# Patient Record
Sex: FEMALE | Race: WHITE | Employment: FULL TIME | URBAN - METROPOLITAN AREA
[De-identification: names, ages, dates, MRNs, and addresses within clinical notes are randomized per-mention and may not be internally consistent; named-entity substitution may affect disease eponyms.]

---

## 2017-02-13 PROBLEM — N92.1 MENOMETRORRHAGIA: Status: ACTIVE | Noted: 2017-02-13

## 2017-02-13 PROBLEM — R31.9 HEMATURIA: Status: ACTIVE | Noted: 2017-02-13

## 2017-02-13 PROBLEM — N80.9 ENDOMETRIOSIS: Status: ACTIVE | Noted: 2017-02-13

## 2017-03-29 ENCOUNTER — HOSPITAL ENCOUNTER (OUTPATIENT)
Dept: MAMMOGRAPHY | Age: 47
Discharge: HOME | End: 2017-03-29
Attending: OBSTETRICS & GYNECOLOGY
Payer: SELF-PAY

## 2017-03-29 DIAGNOSIS — Z12.39 SCREENING FOR BREAST CANCER: ICD-10-CM

## 2017-03-29 PROCEDURE — 77067 SCR MAMMO BI INCL CAD: CPT

## 2018-03-30 ENCOUNTER — HOSPITAL ENCOUNTER (OUTPATIENT)
Dept: MAMMOGRAPHY | Age: 48
Discharge: HOME | End: 2018-03-30
Attending: OBSTETRICS & GYNECOLOGY
Payer: SELF-PAY

## 2018-03-30 DIAGNOSIS — Z12.31 VISIT FOR SCREENING MAMMOGRAM: ICD-10-CM

## 2018-03-30 PROCEDURE — 77063 BREAST TOMOSYNTHESIS BI: CPT

## 2018-04-12 ENCOUNTER — HOSPITAL ENCOUNTER (OUTPATIENT)
Dept: MAMMOGRAPHY | Age: 48
Discharge: HOME | End: 2018-04-12
Attending: OBSTETRICS & GYNECOLOGY
Payer: SELF-PAY

## 2018-04-12 DIAGNOSIS — R92.8 ABNORMAL SCREENING MAMMOGRAM: ICD-10-CM

## 2018-04-12 PROCEDURE — 76642 ULTRASOUND BREAST LIMITED: CPT

## 2018-04-13 PROBLEM — N60.09 BREAST CYST: Status: ACTIVE | Noted: 2018-04-13

## 2019-07-19 ENCOUNTER — HOSPITAL ENCOUNTER (OUTPATIENT)
Dept: MAMMOGRAPHY | Age: 49
Discharge: HOME | End: 2019-07-19
Attending: OBSTETRICS & GYNECOLOGY
Payer: SELF-PAY

## 2019-07-19 DIAGNOSIS — Z12.31 ENCOUNTER FOR SCREENING MAMMOGRAM FOR MALIGNANT NEOPLASM OF BREAST: ICD-10-CM

## 2019-07-19 PROCEDURE — 77063 BREAST TOMOSYNTHESIS BI: CPT

## 2019-07-22 NOTE — PROGRESS NOTES
3D Mammo (7/22/19): BiRads 2, BD3    BD3 indicates more dense breast tissue which can affect detection of early breast cancers. If pt desired FU breast MRI she can request this but would recommend checking with her insurance company first for coverage. Would also recommend getting 3D mammo next year. Breast density information should have been mailed to the pt already. Call with any questions.

## 2019-08-01 ENCOUNTER — HOSPITAL ENCOUNTER (OUTPATIENT)
Dept: LAB | Age: 49
Discharge: HOME | End: 2019-08-01

## 2019-08-01 PROCEDURE — 88305 TISSUE EXAM BY PATHOLOGIST: CPT

## 2020-06-17 ENCOUNTER — HOSPITAL ENCOUNTER (OUTPATIENT)
Dept: MAMMOGRAPHY | Age: 50
Discharge: HOME | End: 2020-06-17
Attending: OBSTETRICS & GYNECOLOGY
Payer: SELF-PAY

## 2020-06-17 DIAGNOSIS — Z12.39 SCREENING FOR BREAST CANCER: ICD-10-CM

## 2020-06-17 PROCEDURE — 77063 BREAST TOMOSYNTHESIS BI: CPT

## 2020-06-19 PROBLEM — R92.2 DENSE BREAST TISSUE: Status: ACTIVE | Noted: 2020-06-19

## 2020-06-19 NOTE — PROGRESS NOTES
BiRads 2, BD4    BD4 indicates very dense breast tissue which can affect detection of early breast cancers. Would consider FU breast MRI ---would recommend checking with her insurance company first for coverage. Would also recommend getting 3D mammo next year. Breast density information should have been mailed to the pt already. Call with any questions.

## 2021-05-24 ENCOUNTER — TRANSCRIBE ORDER (OUTPATIENT)
Dept: SCHEDULING | Age: 51
End: 2021-05-24

## 2021-05-24 DIAGNOSIS — Z12.31 SCREENING MAMMOGRAM FOR HIGH-RISK PATIENT: Primary | ICD-10-CM

## 2021-07-13 PROBLEM — M47.14 THORACIC MYELOPATHY: Status: ACTIVE | Noted: 2021-07-13

## 2021-07-13 PROBLEM — G95.81 CONUS MEDULLARIS SYNDROME (HCC): Status: ACTIVE | Noted: 2021-07-13

## 2021-08-16 ENCOUNTER — TRANSCRIBE ORDER (OUTPATIENT)
Dept: SCHEDULING | Age: 51
End: 2021-08-16

## 2021-08-16 DIAGNOSIS — Z12.31 SCREENING MAMMOGRAM FOR HIGH-RISK PATIENT: Primary | ICD-10-CM

## 2021-08-16 PROBLEM — S24.143A: Status: ACTIVE | Noted: 2021-08-16

## 2021-08-16 PROBLEM — R25.2 SPASTICITY: Status: ACTIVE | Noted: 2021-08-16

## 2021-08-17 ENCOUNTER — HOSPITAL ENCOUNTER (OUTPATIENT)
Dept: MAMMOGRAPHY | Age: 51
Discharge: HOME | End: 2021-08-17
Attending: OBSTETRICS & GYNECOLOGY

## 2021-08-17 DIAGNOSIS — Z12.31 SCREENING MAMMOGRAM FOR HIGH-RISK PATIENT: ICD-10-CM

## 2021-08-17 PROCEDURE — 77063 BREAST TOMOSYNTHESIS BI: CPT

## 2021-08-18 NOTE — PROGRESS NOTES
BI-RADS 2, BD 3    BD3 indicates more dense breast tissue which can affect detection of early breast cancers. *  If pt desired FU breast MRI she can request this but would recommend checking with her insurance company first for coverage. Would also recommend getting 3D mammo next year. Breast density information should have been mailed to the pt already. Call with any questions.

## 2021-10-05 ENCOUNTER — HOSPITAL ENCOUNTER (OUTPATIENT)
Dept: MRI IMAGING | Age: 51
Discharge: HOME | End: 2021-10-05
Attending: PSYCHIATRY & NEUROLOGY

## 2021-10-05 DIAGNOSIS — G95.81 CONUS MEDULLARIS SYNDROME (HCC): ICD-10-CM

## 2021-10-05 DIAGNOSIS — R25.2 SPASTICITY: ICD-10-CM

## 2021-10-05 DIAGNOSIS — S24.143A: ICD-10-CM

## 2021-10-05 DIAGNOSIS — M47.14 THORACIC MYELOPATHY: ICD-10-CM

## 2021-11-02 ENCOUNTER — HOSPITAL ENCOUNTER (OUTPATIENT)
Dept: MRI IMAGING | Age: 51
Discharge: HOME | End: 2021-11-02
Attending: PSYCHIATRY & NEUROLOGY

## 2021-11-02 DIAGNOSIS — R25.2 SPASTICITY: ICD-10-CM

## 2021-11-02 DIAGNOSIS — G95.81 CONUS MEDULLARIS SYNDROME (HCC): ICD-10-CM

## 2021-11-02 DIAGNOSIS — M47.14 THORACIC MYELOPATHY: ICD-10-CM

## 2021-11-02 DIAGNOSIS — S24.143A: ICD-10-CM

## 2021-11-02 PROCEDURE — 72157 MRI CHEST SPINE W/O & W/DYE: CPT

## 2021-11-02 PROCEDURE — 74011250636 HC RX REV CODE- 250/636: Performed by: PSYCHIATRY & NEUROLOGY

## 2021-11-02 PROCEDURE — A9576 INJ PROHANCE MULTIPACK: HCPCS | Performed by: PSYCHIATRY & NEUROLOGY

## 2021-11-02 RX ORDER — SODIUM CHLORIDE 0.9 % (FLUSH) 0.9 %
10 SYRINGE (ML) INJECTION
Status: COMPLETED | OUTPATIENT
Start: 2021-11-02 | End: 2021-11-02

## 2021-11-02 RX ADMIN — Medication 10 ML: at 21:33

## 2021-11-02 RX ADMIN — GADOTERIDOL 13 ML: 279.3 INJECTION, SOLUTION INTRAVENOUS at 21:33

## 2021-11-04 ENCOUNTER — HOSPITAL ENCOUNTER (EMERGENCY)
Age: 51
Discharge: HOME | End: 2021-11-04
Attending: EMERGENCY MEDICINE

## 2021-11-04 VITALS
RESPIRATION RATE: 20 BRPM | OXYGEN SATURATION: 100 % | HEART RATE: 85 BPM | DIASTOLIC BLOOD PRESSURE: 96 MMHG | BODY MASS INDEX: 23.3 KG/M2 | WEIGHT: 145 LBS | SYSTOLIC BLOOD PRESSURE: 173 MMHG | TEMPERATURE: 98 F | HEIGHT: 66 IN

## 2021-11-04 DIAGNOSIS — R21 RASH: Primary | ICD-10-CM

## 2021-11-04 LAB
GLUCOSE BLD STRIP.AUTO-MCNC: 95 MG/DL (ref 65–100)
SERVICE CMNT-IMP: NORMAL
VIT B12 SERPL-MCNC: 192 PG/ML (ref 193–986)

## 2021-11-04 PROCEDURE — 99283 EMERGENCY DEPT VISIT LOW MDM: CPT

## 2021-11-04 PROCEDURE — 82962 GLUCOSE BLOOD TEST: CPT

## 2021-11-04 PROCEDURE — 82607 VITAMIN B-12: CPT

## 2021-11-04 RX ORDER — PREDNISONE 5 MG/1
TABLET ORAL
Qty: 48 TABLET | Refills: 0 | Status: SHIPPED | OUTPATIENT
Start: 2021-11-04 | End: 2022-01-13

## 2021-11-04 NOTE — ED TRIAGE NOTES
Pt states knot on left side of neck for about one month. States she has a rash on neck also. Pt has been evaluated by neurologist due to back and neck pain but needs some blood drawn today. Masked for triage.

## 2021-11-04 NOTE — ED NOTES
Pt with several months h/o worsening weakness to lower ext, has a neurologist has been following her. She has had lumbar punctures and had a thoracic MRI done on Tuesday that was negative, neurologist wished to get a B12 level and then schedule perhaps MRI of the brain and cervical spine patient to ER complaint of continued not to the left side of her neck that is been there for about a month failed steroid treatment.   She has no primary care physician

## 2021-11-04 NOTE — ED NOTES
I have reviewed discharge instructions with the patient. The patient verbalized understanding. Patient left ED via Discharge Method: ambulatory to Home with self. Opportunity for questions and clarification provided. Patient given 1 scripts. To continue your aftercare when you leave the hospital, you may receive an automated call from our care team to check in on how you are doing. This is a free service and part of our promise to provide the best care and service to meet your aftercare needs.  If you have questions, or wish to unsubscribe from this service please call [phone]. Thank you for Choosing our [facility] Emergency Department.

## 2021-11-04 NOTE — ED PROVIDER NOTES
For about 1 month she has had some rash to the back of her neck. It is somewhat thickened and remains itching. She is changed to a T-Gel shampoo and it seems like that and some topical steroids have helped some but incompletely so. Now has some areas that are behind the left ear as well it is more on the left side of the neck than the right. She was worried that it might be from sleeping too hot and got a cooling pillow and is not changed with this. She is not diabetic. 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 No direct contact to this area of any chemicals or irritants that she can recall. She has never had anything similar. She has a knot to the left side of her neck she became worried about comes to our department partially related to this. The history is provided by the patient. Neck Pain   This is a chronic (More than 1 month) problem. The problem occurs constantly. The problem has not changed since onset. There has been no fever. The pain is the same all the time. She has tried nothing for the symptoms.         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 ([address] 8/16/2021    Conus medullaris syndrome ([address] 7/13/2021    Endometriosis determined by laparoscopy     Fibroids     Fibromyalgia     High cholesterol     Thoracic myelopathy 7/13/2021       Past Surgical History:   Procedure Laterality Date    HX GYN  1990s    laparoscopy    HX HEENT      right ear, stapes replaced    HX ORTHOPAEDIC      carpal tunnel release, right         Family History:   Problem Relation Age of Onset    Hypertension Mother     Cancer Mother     Heart Disease Maternal Grandmother     Hypertension Maternal Grandmother     Diabetes Maternal Grandmother     Malignant Hyperthermia Neg Hx     Pseudocholinesterase Deficiency Neg Hx     Delayed Awakening Neg Hx     Post-op Nausea/Vomiting Neg Hx     Emergence Delirium Neg Hx     Post-op Cognitive Dysfunction Neg Hx     Other Neg Hx     Breast Cancer Neg Hx        Social History     Socioeconomic History    Marital status: SINGLE     Spouse name: Not on file    Number of children: Not on file    Years of education: Not on file    Highest education level: Not on file   Occupational History    Not on file   Tobacco Use    Smoking status: Never Smoker    Smokeless tobacco: Never Used   Vaping Use    Vaping Use: Never used   Substance and Sexual Activity    Alcohol use: No    Drug use: No    Sexual activity: Yes     Partners: Male     Birth control/protection: Pill   Other Topics Concern    Not on file   Social History Narrative    Not on file     Social Determinants of Health     Financial Resource Strain:     Difficulty of Paying Living Expenses: Not on file   Food Insecurity:     Worried About Running Out of Food in the Last Year: Not on file    Rabia of Food in the Last Year: Not on file   Transportation Needs:     Lack of Transportation (Medical): Not on file    Lack of Transportation (Non-Medical):  Not on file   Physical Activity:     Days of Exercise per Week: Not on file    Minutes of Exercise per Session: Not on file   Stress:     Feeling of Stress : Not on file   Social Connections:     Frequency of Communication with Friends and Family: Not on file    Frequency of Social Gatherings with Friends and Family: Not on file    Attends Evangelical Services: Not on file    Active Member of [address] [place] [address] [employer] or Organizations: Not on file    Attends Club or Organization Meetings: Not on file    Marital Status: Not on file   Intimate Partner Violence:     Fear of Current or Ex-Partner: Not on file    Emotionally Abused: Not on file    Physically Abused: Not on file    Sexually Abused: Not on file   Housing Stability:     Unable to Pay for Housing in the Last Year: Not on file    Number of [place] in the Last Year: Not on file    Unstable Housing in the Last Year: Not on file ALLERGIES: Patient has no known allergies. Review of Systems   Musculoskeletal: Positive for neck pain. Skin: Positive for rash. Neurological: Negative. Psychiatric/Behavioral: Negative. All other systems reviewed and are negative. Vitals:    11/04/21 1616   BP: (!) 173/96   Pulse: 85   Resp: 20   Temp: 98 °F (36.7 °C)   SpO2: 100%   Weight: 65.8 kg (145 lb)   Height: 5' 6\" (1.676 m)            Physical Exam  Vitals and nursing note reviewed. Constitutional:       General: She is not in acute distress. Appearance: Normal appearance. She is well-developed. HENT:      Head: Atraumatic. Right Ear: External ear normal.      Left Ear: External ear normal.      Nose: Nose normal.   Eyes:      General: No scleral icterus. Cardiovascular:      Rate and Rhythm: Normal rate. Pulmonary:      Effort: Pulmonary effort is normal. No respiratory distress. Abdominal:      Palpations: Abdomen is soft. Musculoskeletal:         General: Normal range of motion. Cervical back: Neck supple. Skin:     General: Skin is warm and dry. Findings: Rash present. Comments: Dry slightly raised and thickened rash to the nape of her neck little bit more to the left of midline than central with some extension of similar though less involved to the area behind the left ear   Neurological:      Mental Status: She is alert. Mental status is at baseline. Psychiatric:         Thought Content: Thought content normal.          MDM  Number of Diagnoses or Management Options  Diagnosis management comments: Rashes been present for well over a month. Most likely is related to some sort of contact irritant though unable to identify in discussion with patient. Place her on a short course of oral steroids. She is not diabetic. If persists may need dermatology or other work-up.   She is to change to mild soaps and shampoos and to shampoo away from area       Amount and/or Complexity of Data Reviewed  Clinical lab tests: reviewed    Risk of Complications, Morbidity, and/or Mortality  Presenting problems: moderate  Management options: moderate    Patient Progress  Patient progress: stable         Procedures

## 2021-11-04 NOTE — DISCHARGE INSTRUCTIONS
Suspect the rash is due to some contact irritant a certain you are not wearing necklaces and use mild shampoo you may continue to use present shampoo(T gell)  Benadryl for itching (by mouth)  Not on the left side your neck does not appear to be related to acute abscess but most likely inflammatory reaction to adjacent lymph node

## 2022-01-05 ENCOUNTER — HOSPITAL ENCOUNTER (OUTPATIENT)
Dept: MRI IMAGING | Age: 52
Discharge: HOME | End: 2022-01-05
Attending: PSYCHIATRY & NEUROLOGY

## 2022-01-05 DIAGNOSIS — R25.2 SPASTICITY: ICD-10-CM

## 2022-01-05 DIAGNOSIS — S24.143A: ICD-10-CM

## 2022-01-05 DIAGNOSIS — G37.9 CNS DEMYELINATING DISEASE (HCC): ICD-10-CM

## 2022-01-05 PROCEDURE — 74011250636 HC RX REV CODE- 250/636: Performed by: PSYCHIATRY & NEUROLOGY

## 2022-01-05 PROCEDURE — 72156 MRI NECK SPINE W/O & W/DYE: CPT

## 2022-01-05 PROCEDURE — 70553 MRI BRAIN STEM W/O & W/DYE: CPT

## 2022-01-05 PROCEDURE — A9576 INJ PROHANCE MULTIPACK: HCPCS | Performed by: PSYCHIATRY & NEUROLOGY

## 2022-01-05 RX ORDER — SODIUM CHLORIDE 0.9 % (FLUSH) 0.9 %
10 SYRINGE (ML) INJECTION
Status: COMPLETED | OUTPATIENT
Start: 2022-01-05 | End: 2022-01-05

## 2022-01-05 RX ADMIN — GADOTERIDOL 13 ML: 279.3 INJECTION, SOLUTION INTRAVENOUS at 19:55

## 2022-01-05 RX ADMIN — Medication 10 ML: at 19:55

## 2022-01-14 RX ORDER — CEFAZOLIN SODIUM/WATER 2 G/20 ML
2 SYRINGE (ML) INTRAVENOUS ONCE
Status: CANCELLED | OUTPATIENT
Start: 2022-01-14 | End: 2022-01-14

## 2022-01-19 ENCOUNTER — HOSPITAL ENCOUNTER (OUTPATIENT)
Dept: SURGERY | Age: 52
Discharge: HOME | End: 2022-01-19

## 2022-01-19 VITALS
TEMPERATURE: 97.3 F | HEART RATE: 74 BPM | WEIGHT: 148.4 LBS | RESPIRATION RATE: 18 BRPM | OXYGEN SATURATION: 100 % | SYSTOLIC BLOOD PRESSURE: 166 MMHG | DIASTOLIC BLOOD PRESSURE: 73 MMHG | BODY MASS INDEX: 23.85 KG/M2 | HEIGHT: 66 IN

## 2022-01-19 LAB
ANION GAP SERPL CALC-SCNC: 6 MMOL/L (ref 7–16)
APPEARANCE UR: ABNORMAL
BACTERIA SPEC CULT: NORMAL
BACTERIA URNS QL MICRO: ABNORMAL /HPF
BASOPHILS # BLD: 0 K/UL (ref 0–0.2)
BASOPHILS NFR BLD: 1 % (ref 0–2)
BILIRUB UR QL: NEGATIVE
BUN SERPL-MCNC: 8 MG/DL (ref 6–23)
CALCIUM SERPL-MCNC: 9.3 MG/DL (ref 8.3–10.4)
CASTS URNS QL MICRO: ABNORMAL /LPF
CHLORIDE SERPL-SCNC: 109 MMOL/L (ref 98–107)
CO2 SERPL-SCNC: 26 MMOL/L (ref 21–32)
COLOR UR: YELLOW
CREAT SERPL-MCNC: 0.74 MG/DL (ref 0.6–1)
DIFFERENTIAL METHOD BLD: NORMAL
EOSINOPHIL # BLD: 0.3 K/UL (ref 0–0.8)
EOSINOPHIL NFR BLD: 6 % (ref 0.5–7.8)
EPI CELLS #/AREA URNS HPF: ABNORMAL /HPF
ERYTHROCYTE [DISTWIDTH] IN BLOOD BY AUTOMATED COUNT: 12.5 % (ref 11.9–14.6)
GLUCOSE SERPL-MCNC: 94 MG/DL (ref 65–100)
GLUCOSE UR STRIP.AUTO-MCNC: NEGATIVE MG/DL
HCT VFR BLD AUTO: 39.3 % (ref 35.8–46.3)
HGB BLD-MCNC: 13.4 G/DL (ref 11.7–15.4)
HGB UR QL STRIP: ABNORMAL
IMM GRANULOCYTES # BLD AUTO: 0 K/UL (ref 0–0.5)
IMM GRANULOCYTES NFR BLD AUTO: 1 % (ref 0–5)
KETONES UR QL STRIP.AUTO: NEGATIVE MG/DL
LEUKOCYTE ESTERASE UR QL STRIP.AUTO: ABNORMAL
LYMPHOCYTES # BLD: 1 K/UL (ref 0.5–4.6)
LYMPHOCYTES NFR BLD: 20 % (ref 13–44)
MCH RBC QN AUTO: 30.7 PG (ref 26.1–32.9)
MCHC RBC AUTO-ENTMCNC: 34.1 G/DL (ref 31.4–35)
MCV RBC AUTO: 90.1 FL (ref 79.6–97.8)
MONOCYTES # BLD: 0.4 K/UL (ref 0.1–1.3)
MONOCYTES NFR BLD: 8 % (ref 4–12)
NEUTS SEG # BLD: 3.4 K/UL (ref 1.7–8.2)
NEUTS SEG NFR BLD: 65 % (ref 43–78)
NITRITE UR QL STRIP.AUTO: NEGATIVE
NRBC # BLD: 0 K/UL (ref 0–0.2)
PH UR STRIP: 5.5 [PH] (ref 5–9)
PLATELET # BLD AUTO: 270 K/UL (ref 150–450)
PMV BLD AUTO: 9.8 FL (ref 9.4–12.3)
POTASSIUM SERPL-SCNC: 4.1 MMOL/L (ref 3.5–5.1)
PROT UR STRIP-MCNC: NEGATIVE MG/DL
RBC # BLD AUTO: 4.36 M/UL (ref 4.05–5.2)
RBC #/AREA URNS HPF: ABNORMAL /HPF
SERVICE CMNT-IMP: NORMAL
SODIUM SERPL-SCNC: 141 MMOL/L (ref 136–145)
SP GR UR REFRACTOMETRY: 1.01 (ref 1–1.02)
UROBILINOGEN UR QL STRIP.AUTO: 0.2 EU/DL (ref 0.2–1)
WBC # BLD AUTO: 5.2 K/UL (ref 4.3–11.1)
WBC URNS QL MICRO: ABNORMAL /HPF

## 2022-01-19 PROCEDURE — 77030027138 HC INCENT SPIROMETER -A

## 2022-01-19 PROCEDURE — 87641 MR-STAPH DNA AMP PROBE: CPT

## 2022-01-19 PROCEDURE — 80048 BASIC METABOLIC PNL TOTAL CA: CPT

## 2022-01-19 PROCEDURE — 85025 COMPLETE CBC W/AUTO DIFF WBC: CPT

## 2022-01-19 PROCEDURE — 81001 URINALYSIS AUTO W/SCOPE: CPT

## 2022-01-19 RX ORDER — HYDROCORTISONE 10 MG/ML
LOTION TOPICAL
COMMUNITY

## 2022-01-19 RX ORDER — DIPHENHYDRAMINE HCL 25 MG
25 TABLET ORAL AS NEEDED
COMMUNITY

## 2022-01-19 NOTE — PERIOP NOTES
Recent Results (from the past 12 hour(s))   URINALYSIS W/ RFLX MICROSCOPIC    Collection Time: 01/19/22  1:00 PM   Result Value Ref Range    Color YELLOW      Appearance CLOUDY      Specific gravity 1.015 1.001 - 1.023      pH (UA) 5.5 5.0 - 9.0      Protein Negative NEG mg/dL    Glucose Negative mg/dL    Ketone Negative NEG mg/dL    Bilirubin Negative NEG      Blood SMALL (A) NEG      Urobilinogen 0.2 0.2 - 1.0 EU/dL    Nitrites Negative NEG      Leukocyte Esterase TRACE (A) NEG      WBC 10-20 0 /hpf    RBC 5-10 0 /hpf    Epithelial cells 5-10 0 /hpf    Bacteria TRACE 0 /hpf    Casts 3-5 0 /lpf   CBC WITH AUTOMATED DIFF    Collection Time: 01/19/22  1:06 PM   Result Value Ref Range    WBC 5.2 4.3 - 11.1 K/uL    RBC 4.36 4.05 - 5.2 M/uL    HGB 13.4 11.7 - 15.4 g/dL    HCT 39.3 35.8 - 46.3 %    MCV 90.1 79.6 - 97.8 FL    MCH 30.7 26.1 - 32.9 PG    MCHC 34.1 31.4 - 35.0 g/dL    RDW 12.5 11.9 - 14.6 %    PLATELET 974 612 - 437 K/uL    MPV 9.8 9.4 - 12.3 FL    ABSOLUTE NRBC 0.00 0.0 - 0.2 K/uL    DF AUTOMATED      NEUTROPHILS 65 43 - 78 %    LYMPHOCYTES 20 13 - 44 %    MONOCYTES 8 4.0 - 12.0 %    EOSINOPHILS 6 0.5 - 7.8 %    BASOPHILS 1 0.0 - 2.0 %    IMMATURE GRANULOCYTES 1 0.0 - 5.0 %    ABS. NEUTROPHILS 3.4 1.7 - 8.2 K/UL    ABS. LYMPHOCYTES 1.0 0.5 - 4.6 K/UL    ABS. MONOCYTES 0.4 0.1 - 1.3 K/UL    ABS. EOSINOPHILS 0.3 0.0 - 0.8 K/UL    ABS. BASOPHILS 0.0 0.0 - 0.2 K/UL    ABS. IMM. GRANS. 0.0 0.0 - 0.5 K/UL   METABOLIC PANEL, BASIC    Collection Time: 01/19/22  1:06 PM   Result Value Ref Range    Sodium 141 136 - 145 mmol/L    Potassium 4.1 3.5 - 5.1 mmol/L    Chloride 109 (H) 98 - 107 mmol/L    CO2 26 21 - 32 mmol/L    Anion gap 6 (L) 7 - 16 mmol/L    Glucose 94 65 - 100 mg/dL    BUN 8 6 - 23 MG/DL    Creatinine 0.74 0.6 - 1.0 MG/DL    GFR est AA >60 >60 ml/min/1.73m2    GFR est non-AA >60 >60 ml/min/1.73m2    Calcium 9.3 8.3 - 10.4 MG/DL     Reviewed.   Pramod Aldrich at Dr. Mary Cintron office notified of UA results: blood small, bacteria trace, leukocyte esterase trace. Shira No advised will forward to [employer].     MRSA/MSSA in process, chart flagged for charge RN  Routed to surgeon

## 2022-01-19 NOTE — PERIOP NOTES
PLEASE CONTINUE TAKING ALL PRESCRIPTION MEDICATIONS UP TO THE DAY OF SURGERY UNLESS OTHERWISE DIRECTED BELOW. DISCONTINUE all vitamins and supplements now for surgery. DISCONTINUE Non-Steriodal Anti-Inflammatory (NSAIDS) such as Advil and Aleve 5 days prior to surgery. Home Medications to take  the day of surgery    sprintec           Home Medications   to Hold   Vitamins, Supplements, and Herbals. Non-Steriodal Anti-Inflammatory (NSAIDS) such as Advil and Aleve. Comments    Covid test 1/21/22 at 9:20 am @ [facility]    On the day before surgery please take Acetaminophen 1000mg in the morning and then again before bed. You may substitute for Tylenol 650 mg.      Bring the incentive spirometer to the hospital       Please do not bring home medications with you on the day of surgery unless otherwise directed by your nurse. If you are instructed to bring home medications, please give them to your nurse as they will be administered by the nursing staff. If you have any questions, please call [address] [place] [address] [phone] or [facility] [phone]. A copy of this note was provided to the patient for reference. How to Use Your Incentive Spirometer       About Your Incentive Spirometer  An incentive spirometer is a device that will expand your lungs by helping you to breathe more deeply and fully. The parts of your incentive spirometer are labeled in Figure 1. Using your incentive spirometer  When youre using your incentive spirometer, make sure to breathe through your mouth. If you breathe through your nose, the incentive spirometer wont work properly. You can hold your nose if you have trouble. DO NOT BLOW INTO THE DEVICE. If you feel dizzy at any time, stop and rest. Try again at a later time. 1. Sit upright in a chair or in bed. Hold the incentive spirometer at eye level.    2. Put the mouthpiece in your mouth and close your lips tightly around it. Slowly breathe out (exhale) completely. 3. Breathe in (inhale) slowly through your mouth as deeply as you can. As you take the breath, you will see the piston rise inside the large column. While the piston rises, the indicator on the right should move upwards. It should stay in between the 2 arrows (see Figure 1). 4. Try to get the piston as high as you can, while keeping the indicator between the arrows. If the indicator doesnt stay between the arrows, youre breathing either too fast or too slow. 5. When you get it as high as you can, hold your breath for 10 seconds, or as long as possible. While youre holding your breath, the piston will slowly fall to the base of the spirometer. 6. Once the piston reaches the bottom of the spirometer, breathe out slowly through your mouth. Rest for a few seconds. 7. Repeat 10 times. Try to get the piston to the same level with each breath. 8. After each set of 10 breaths, try to cough as coughing will help loosen or clear any mucus in your lungs. 9. Put the marker at the level the piston reached on your incentive spirometer. This will be your goal next time. Repeat these steps every hour that youre awake.   Cover the mouthpiece of the incentive spirometer when you arent using it

## 2022-01-19 NOTE — PERIOP NOTES
Patient verified name, , and surgery as listed in [facility]. Patient provided medical/health information and PTA medications to the best of their ability. TYPE  CASE: 3  Orders per surgeon:  received  Labs per surgeon/spine protocol: CBC with diff, BMP, UA, MRSA/MSSA Results: pending  Labs per anesthesia protocol: CBC, BMP included in above labs. T&s s/h for DOS  EKG:  Not indicated    Patient COVID test date 22; Patient confirmed the appointment. The testing center is located at the Health system. If appointment is needed patient provided telephone number of [phone]. Nasal Swab collected per MD order. Patient provided with and instructed on education handouts including Guide to Surgery, blood transfusions, pain management, and hand hygiene for the family and community, and [facility] brochure. Road to Recovery Spine surgery patient guide given. Instructed on incentive spirometer, and patient given an incentive spirometer demonstration. Patient viewed spine prehab video. Hibiclens and instructions given per hospital policy. Medications discussed during patient appointment. Patient teach back successful and patient demonstrates knowledge of instruction.

## 2022-01-24 ENCOUNTER — ANESTHESIA EVENT (OUTPATIENT)
Dept: SURGERY | Age: 52
End: 2022-01-24

## 2022-01-25 ENCOUNTER — ANESTHESIA (OUTPATIENT)
Dept: SURGERY | Age: 52
End: 2022-01-25

## 2022-01-25 ENCOUNTER — HOSPITAL ENCOUNTER (OUTPATIENT)
Age: 52
Setting detail: OUTPATIENT SURGERY
Discharge: HOME | End: 2022-01-25
Attending: ORTHOPAEDIC SURGERY | Admitting: ORTHOPAEDIC SURGERY

## 2022-01-25 ENCOUNTER — APPOINTMENT (OUTPATIENT)
Dept: GENERAL RADIOLOGY | Age: 52
End: 2022-01-25
Attending: ORTHOPAEDIC SURGERY

## 2022-01-25 VITALS
WEIGHT: 145 LBS | BODY MASS INDEX: 23.3 KG/M2 | OXYGEN SATURATION: 98 % | DIASTOLIC BLOOD PRESSURE: 69 MMHG | HEART RATE: 75 BPM | RESPIRATION RATE: 18 BRPM | HEIGHT: 66 IN | SYSTOLIC BLOOD PRESSURE: 140 MMHG | TEMPERATURE: 98.1 F

## 2022-01-25 DIAGNOSIS — M48.02 CERVICAL SPINAL STENOSIS: Primary | ICD-10-CM

## 2022-01-25 PROBLEM — G95.9 MYELOPATHY (HCC): Status: ACTIVE | Noted: 2022-01-25

## 2022-01-25 LAB
ABO + RH BLD: NORMAL
BLOOD GROUP ANTIBODIES SERPL: NORMAL
HCG UR QL: NEGATIVE
SPECIMEN EXP DATE BLD: NORMAL

## 2022-01-25 PROCEDURE — 77030003666 HC NDL SPINAL BD -A: Performed by: ORTHOPAEDIC SURGERY

## 2022-01-25 PROCEDURE — 74011250636 HC RX REV CODE- 250/636

## 2022-01-25 PROCEDURE — 74011000250 HC RX REV CODE- 250: Performed by: ANESTHESIOLOGY

## 2022-01-25 PROCEDURE — 77030012894: Performed by: ORTHOPAEDIC SURGERY

## 2022-01-25 PROCEDURE — 76010000161 HC OR TIME 1 TO 1.5 HR INTENSV-TIER 1: Performed by: ORTHOPAEDIC SURGERY

## 2022-01-25 PROCEDURE — C1713 ANCHOR/SCREW BN/BN,TIS/BN: HCPCS | Performed by: ORTHOPAEDIC SURGERY

## 2022-01-25 PROCEDURE — 77030028270 HC SRGFL HEMSTAT MTRX J&J -C: Performed by: ORTHOPAEDIC SURGERY

## 2022-01-25 PROCEDURE — 77030018673: Performed by: ORTHOPAEDIC SURGERY

## 2022-01-25 PROCEDURE — 77030019908 HC STETH ESOPH SIMS -A: Performed by: ANESTHESIOLOGY

## 2022-01-25 PROCEDURE — 77030010507 HC ADH SKN DERMBND J&J -B: Performed by: ORTHOPAEDIC SURGERY

## 2022-01-25 PROCEDURE — 77030041390 HC GRFT BN PROT GRWTH FCTR BSYC -E: Performed by: ORTHOPAEDIC SURGERY

## 2022-01-25 PROCEDURE — 77030011265 HC ELECTRD BLD HEX COVD -A: Performed by: ORTHOPAEDIC SURGERY

## 2022-01-25 PROCEDURE — 77030037088 HC TUBE ENDOTRACH ORAL NSL COVD-A: Performed by: ANESTHESIOLOGY

## 2022-01-25 PROCEDURE — 72020 X-RAY EXAM OF SPINE 1 VIEW: CPT

## 2022-01-25 PROCEDURE — 74011000250 HC RX REV CODE- 250

## 2022-01-25 PROCEDURE — 74011250636 HC RX REV CODE- 250/636: Performed by: ORTHOPAEDIC SURGERY

## 2022-01-25 PROCEDURE — 76210000021 HC REC RM PH II 0.5 TO 1 HR: Performed by: ORTHOPAEDIC SURGERY

## 2022-01-25 PROCEDURE — 74011250636 HC RX REV CODE- 250/636: Performed by: ANESTHESIOLOGY

## 2022-01-25 PROCEDURE — 81025 URINE PREGNANCY TEST: CPT

## 2022-01-25 PROCEDURE — 76060000033 HC ANESTHESIA 1 TO 1.5 HR: Performed by: ORTHOPAEDIC SURGERY

## 2022-01-25 PROCEDURE — 77030040922 HC BLNKT HYPOTHRM STRY -A: Performed by: ANESTHESIOLOGY

## 2022-01-25 PROCEDURE — 74011000272 HC RX REV CODE- 272: Performed by: ORTHOPAEDIC SURGERY

## 2022-01-25 PROCEDURE — 77030020268 HC MISC GENERAL SUPPLY: Performed by: ORTHOPAEDIC SURGERY

## 2022-01-25 PROCEDURE — 76210000006 HC OR PH I REC 0.5 TO 1 HR: Performed by: ORTHOPAEDIC SURGERY

## 2022-01-25 PROCEDURE — 86900 BLOOD TYPING SEROLOGIC ABO: CPT

## 2022-01-25 PROCEDURE — 74011000250 HC RX REV CODE- 250: Performed by: ORTHOPAEDIC SURGERY

## 2022-01-25 PROCEDURE — C1889 IMPLANT/INSERT DEVICE, NOC: HCPCS | Performed by: ORTHOPAEDIC SURGERY

## 2022-01-25 PROCEDURE — 77030031139 HC SUT VCRL2 J&J -A: Performed by: ORTHOPAEDIC SURGERY

## 2022-01-25 PROCEDURE — 77030040361 HC SLV COMPR DVT MDII -B: Performed by: ORTHOPAEDIC SURGERY

## 2022-01-25 PROCEDURE — 22853 INSJ BIOMECHANICAL DEVICE: CPT | Performed by: ORTHOPAEDIC SURGERY

## 2022-01-25 PROCEDURE — 77030029099 HC BN WAX SSPC -A: Performed by: ORTHOPAEDIC SURGERY

## 2022-01-25 PROCEDURE — 2709999900 HC NON-CHARGEABLE SUPPLY: Performed by: ORTHOPAEDIC SURGERY

## 2022-01-25 PROCEDURE — 22551 ARTHRD ANT NTRBDY CERVICAL: CPT | Performed by: ORTHOPAEDIC SURGERY

## 2022-01-25 PROCEDURE — 77030039425 HC BLD LARYNG TRULITE DISP TELE -A: Performed by: ANESTHESIOLOGY

## 2022-01-25 PROCEDURE — 22845 INSERT SPINE FIXATION DEVICE: CPT | Performed by: ORTHOPAEDIC SURGERY

## 2022-01-25 PROCEDURE — 77030025623 HC BUR RND PRECIS STRY -D: Performed by: ORTHOPAEDIC SURGERY

## 2022-01-25 PROCEDURE — 74011250637 HC RX REV CODE- 250/637: Performed by: ANESTHESIOLOGY

## 2022-01-25 PROCEDURE — 74011250637 HC RX REV CODE- 250/637: Performed by: ORTHOPAEDIC SURGERY

## 2022-01-25 DEVICE — BIO DBM PUTTY
Type: IMPLANTABLE DEVICE | Site: SPINE CERVICAL | Status: FUNCTIONAL
Brand: BIO DBM

## 2022-01-25 DEVICE — PLATE ANT CERV CONSTRND 1 LVL 22MM OZARK VIEW: Type: IMPLANTABLE DEVICE | Site: SPINE CERVICAL | Status: FUNCTIONAL

## 2022-01-25 DEVICE — ANTERIOR CERVICAL CAGE
Type: IMPLANTABLE DEVICE | Site: SPINE CERVICAL | Status: FUNCTIONAL
Brand: TRITANIUM C

## 2022-01-25 DEVICE — SCREW SPNL L14MM DIA4MM SELF STARTING VAR ANT CERV TI OZARK: Type: IMPLANTABLE DEVICE | Site: SPINE CERVICAL | Status: FUNCTIONAL

## 2022-01-25 RX ORDER — ONDANSETRON 2 MG/ML
INJECTION INTRAMUSCULAR; INTRAVENOUS AS NEEDED
Status: DISCONTINUED | OUTPATIENT
Start: 2022-01-25 | End: 2022-01-25 | Stop reason: HOSPADM

## 2022-01-25 RX ORDER — SODIUM CHLORIDE, SODIUM LACTATE, POTASSIUM CHLORIDE, CALCIUM CHLORIDE 600; 310; 30; 20 MG/100ML; MG/100ML; MG/100ML; MG/100ML
75 INJECTION, SOLUTION INTRAVENOUS CONTINUOUS
Status: DISCONTINUED | OUTPATIENT
Start: 2022-01-25 | End: 2022-01-25 | Stop reason: HOSPADM

## 2022-01-25 RX ORDER — PROPOFOL 10 MG/ML
INJECTION, EMULSION INTRAVENOUS AS NEEDED
Status: DISCONTINUED | OUTPATIENT
Start: 2022-01-25 | End: 2022-01-25 | Stop reason: HOSPADM

## 2022-01-25 RX ORDER — KETOROLAC TROMETHAMINE 30 MG/ML
INJECTION, SOLUTION INTRAMUSCULAR; INTRAVENOUS AS NEEDED
Status: DISCONTINUED | OUTPATIENT
Start: 2022-01-25 | End: 2022-01-25 | Stop reason: HOSPADM

## 2022-01-25 RX ORDER — OXYCODONE HYDROCHLORIDE 5 MG/1
10 TABLET ORAL
Status: COMPLETED | OUTPATIENT
Start: 2022-01-25 | End: 2022-01-25

## 2022-01-25 RX ORDER — GLYCOPYRROLATE 0.2 MG/ML
INJECTION INTRAMUSCULAR; INTRAVENOUS AS NEEDED
Status: DISCONTINUED | OUTPATIENT
Start: 2022-01-25 | End: 2022-01-25 | Stop reason: HOSPADM

## 2022-01-25 RX ORDER — FENTANYL CITRATE 50 UG/ML
INJECTION, SOLUTION INTRAMUSCULAR; INTRAVENOUS AS NEEDED
Status: DISCONTINUED | OUTPATIENT
Start: 2022-01-25 | End: 2022-01-25 | Stop reason: HOSPADM

## 2022-01-25 RX ORDER — ROCURONIUM BROMIDE 10 MG/ML
INJECTION, SOLUTION INTRAVENOUS AS NEEDED
Status: DISCONTINUED | OUTPATIENT
Start: 2022-01-25 | End: 2022-01-25 | Stop reason: HOSPADM

## 2022-01-25 RX ORDER — NEOSTIGMINE METHYLSULFATE 1 MG/ML
INJECTION, SOLUTION INTRAVENOUS AS NEEDED
Status: DISCONTINUED | OUTPATIENT
Start: 2022-01-25 | End: 2022-01-25 | Stop reason: HOSPADM

## 2022-01-25 RX ORDER — FENTANYL CITRATE 50 UG/ML
100 INJECTION, SOLUTION INTRAMUSCULAR; INTRAVENOUS ONCE
Status: DISCONTINUED | OUTPATIENT
Start: 2022-01-25 | End: 2022-01-25 | Stop reason: HOSPADM

## 2022-01-25 RX ORDER — ACETAMINOPHEN 500 MG
1000 TABLET ORAL ONCE
Status: COMPLETED | OUTPATIENT
Start: 2022-01-25 | End: 2022-01-25

## 2022-01-25 RX ORDER — DEXAMETHASONE SODIUM PHOSPHATE 4 MG/ML
INJECTION, SOLUTION INTRA-ARTICULAR; INTRALESIONAL; INTRAMUSCULAR; INTRAVENOUS; SOFT TISSUE AS NEEDED
Status: DISCONTINUED | OUTPATIENT
Start: 2022-01-25 | End: 2022-01-25 | Stop reason: HOSPADM

## 2022-01-25 RX ORDER — FAMOTIDINE 20 MG/1
20 TABLET, FILM COATED ORAL ONCE
Status: COMPLETED | OUTPATIENT
Start: 2022-01-25 | End: 2022-01-25

## 2022-01-25 RX ORDER — OXYCODONE HYDROCHLORIDE 5 MG/1
5 TABLET ORAL
Status: DISCONTINUED | OUTPATIENT
Start: 2022-01-25 | End: 2022-01-25 | Stop reason: HOSPADM

## 2022-01-25 RX ORDER — HYDROMORPHONE HYDROCHLORIDE 2 MG/ML
0.5 INJECTION, SOLUTION INTRAMUSCULAR; INTRAVENOUS; SUBCUTANEOUS
Status: DISCONTINUED | OUTPATIENT
Start: 2022-01-25 | End: 2022-01-25 | Stop reason: HOSPADM

## 2022-01-25 RX ORDER — MIDAZOLAM HYDROCHLORIDE 1 MG/ML
2 INJECTION, SOLUTION INTRAMUSCULAR; INTRAVENOUS ONCE
Status: DISCONTINUED | OUTPATIENT
Start: 2022-01-25 | End: 2022-01-25 | Stop reason: HOSPADM

## 2022-01-25 RX ORDER — CEFAZOLIN SODIUM/WATER 2 G/20 ML
2 SYRINGE (ML) INTRAVENOUS ONCE
Status: COMPLETED | OUTPATIENT
Start: 2022-01-25 | End: 2022-01-25

## 2022-01-25 RX ORDER — LIDOCAINE HYDROCHLORIDE 10 MG/ML
0.1 INJECTION INFILTRATION; PERINEURAL AS NEEDED
Status: DISCONTINUED | OUTPATIENT
Start: 2022-01-25 | End: 2022-01-25 | Stop reason: HOSPADM

## 2022-01-25 RX ORDER — EPHEDRINE SULFATE/0.9% NACL/PF 50 MG/5 ML
SYRINGE (ML) INTRAVENOUS AS NEEDED
Status: DISCONTINUED | OUTPATIENT
Start: 2022-01-25 | End: 2022-01-25 | Stop reason: HOSPADM

## 2022-01-25 RX ORDER — MIDAZOLAM HYDROCHLORIDE 1 MG/ML
2 INJECTION, SOLUTION INTRAMUSCULAR; INTRAVENOUS ONCE
Status: COMPLETED | OUTPATIENT
Start: 2022-01-25 | End: 2022-01-25

## 2022-01-25 RX ORDER — OXYCODONE HYDROCHLORIDE 5 MG/1
5 TABLET ORAL
Qty: 30 TABLET | Refills: 0 | Status: SHIPPED | OUTPATIENT
Start: 2022-01-25 | End: 2022-01-30

## 2022-01-25 RX ORDER — LIDOCAINE HYDROCHLORIDE 20 MG/ML
INJECTION, SOLUTION EPIDURAL; INFILTRATION; INTRACAUDAL; PERINEURAL AS NEEDED
Status: DISCONTINUED | OUTPATIENT
Start: 2022-01-25 | End: 2022-01-25 | Stop reason: HOSPADM

## 2022-01-25 RX ADMIN — HYDROMORPHONE HYDROCHLORIDE 0.5 MG: 2 INJECTION, SOLUTION INTRAMUSCULAR; INTRAVENOUS; SUBCUTANEOUS at 10:17

## 2022-01-25 RX ADMIN — Medication 3 MG: at 09:52

## 2022-01-25 RX ADMIN — LIDOCAINE HYDROCHLORIDE 100 MG: 20 INJECTION, SOLUTION EPIDURAL; INFILTRATION; INTRACAUDAL; PERINEURAL at 08:59

## 2022-01-25 RX ADMIN — PHENYLEPHRINE HYDROCHLORIDE 100 MCG: 10 INJECTION INTRAVENOUS at 09:27

## 2022-01-25 RX ADMIN — Medication 10 MG: at 09:49

## 2022-01-25 RX ADMIN — MIDAZOLAM 2 MG: 1 INJECTION INTRAMUSCULAR; INTRAVENOUS at 08:24

## 2022-01-25 RX ADMIN — ONDANSETRON 4 MG: 2 INJECTION INTRAMUSCULAR; INTRAVENOUS at 09:45

## 2022-01-25 RX ADMIN — ROCURONIUM BROMIDE 40 MG: 10 INJECTION, SOLUTION INTRAVENOUS at 09:01

## 2022-01-25 RX ADMIN — PROMETHAZINE HYDROCHLORIDE 3.25 MG: 25 INJECTION INTRAMUSCULAR; INTRAVENOUS at 11:14

## 2022-01-25 RX ADMIN — KETOROLAC TROMETHAMINE 15 MG: 30 INJECTION, SOLUTION INTRAMUSCULAR; INTRAVENOUS at 09:54

## 2022-01-25 RX ADMIN — PROPOFOL 180 MG: 10 INJECTION, EMULSION INTRAVENOUS at 09:00

## 2022-01-25 RX ADMIN — FAMOTIDINE 20 MG: 20 TABLET, FILM COATED ORAL at 06:53

## 2022-01-25 RX ADMIN — Medication 3 AMPULE: at 06:53

## 2022-01-25 RX ADMIN — Medication 2 G: at 09:11

## 2022-01-25 RX ADMIN — SODIUM CHLORIDE, SODIUM LACTATE, POTASSIUM CHLORIDE, AND CALCIUM CHLORIDE 75 ML/HR: 600; 310; 30; 20 INJECTION, SOLUTION INTRAVENOUS at 06:53

## 2022-01-25 RX ADMIN — FENTANYL CITRATE 50 MCG: 50 INJECTION INTRAMUSCULAR; INTRAVENOUS at 09:57

## 2022-01-25 RX ADMIN — DEXAMETHASONE SODIUM PHOSPHATE 4 MG: 4 INJECTION, SOLUTION INTRAMUSCULAR; INTRAVENOUS at 09:17

## 2022-01-25 RX ADMIN — SODIUM CHLORIDE, SODIUM LACTATE, POTASSIUM CHLORIDE, AND CALCIUM CHLORIDE: 600; 310; 30; 20 INJECTION, SOLUTION INTRAVENOUS at 10:05

## 2022-01-25 RX ADMIN — OXYCODONE 10 MG: 5 TABLET ORAL at 10:42

## 2022-01-25 RX ADMIN — FENTANYL CITRATE 50 MCG: 50 INJECTION INTRAMUSCULAR; INTRAVENOUS at 08:59

## 2022-01-25 RX ADMIN — FENTANYL CITRATE 50 MCG: 50 INJECTION INTRAMUSCULAR; INTRAVENOUS at 09:15

## 2022-01-25 RX ADMIN — GLYCOPYRROLATE 0.4 MG: 0.2 INJECTION, SOLUTION INTRAMUSCULAR; INTRAVENOUS at 09:52

## 2022-01-25 RX ADMIN — ACETAMINOPHEN 1000 MG: 500 TABLET ORAL at 06:53

## 2022-01-25 NOTE — ANESTHESIA PREPROCEDURE EVALUATION
Relevant Problems   No relevant active problems       Anesthetic History               Review of Systems / Medical History  Patient summary reviewed and pertinent labs reviewed    Pulmonary                   Neuro/Psych         Neuromuscular disease     Cardiovascular                  Exercise tolerance: >4 METS     GI/Hepatic/Renal                Endo/Other             Other Findings   Comments: Numbness in legs  Brown Sequard syndrome T10  No Succinylcholine         Physical Exam    Airway  Mallampati: I  TM Distance: > 6 cm  Neck ROM: normal range of motion   Mouth opening: Normal     Cardiovascular  Regular rate and rhythm,  S1 and S2 normal,  no murmur, click, rub, or gallop  Rhythm: regular  Rate: normal         Dental  No notable dental hx       Pulmonary  Breath sounds clear to auscultation               Abdominal         Other Findings            Anesthetic Plan    ASA: 2  Anesthesia type: general            Anesthetic plan and risks discussed with: Patient

## 2022-01-25 NOTE — OP NOTES
[address] [address]   [phone]    OPERATIVE REPORT  Patient Marshall Diallo  [id number]  1970  47 y.o. DATE OF SURGERY: 1/25/2022    SURGEON: Dennise Carrasco M.D. PREOPERATIVE DIAGNOSIS:  C5 - C6 stenosis. POSTOPERATIVE DIAGNOSIS:  C5 - C6 stenosis. PROCEDURE:     1. Anterior cervical diskectomy and fusion C5 - C6.  (CPT 33710)     2. Anterior cervical instrumentation  C5 - C6.  (CPT 69664)     3. Insertion biomechanical device  C5 - C6 (CPT K114036)    ANESTHESIA:  General.    ESTIMATED BLOOD LOSS:  5 cc    INTRAOPERATIVE COMPLICATIONS:  None. POSTOPERATIVE CONDITION:  Stable. IMPLANTS:   Implant Name Type Inv. Item Serial No.  Lot No. LRB No. Used Action   0.5cc ProteiOS growth factor   [id number] BIOLOGICA  N/A 1 Implanted   SERVICE FEE 1CC BIO DBM PTTY - [id number]  SERVICE FEE 1CC BIO DBM PTTY  [employer]_WD [id number] N/A 1 Implanted   CAGE SPNL [id number] 10DEG LORD TRITANIUM C - [id number]  CAGE SPNL [id number] 10DEG LORD TRITANIUM C  CARLOS SPINE HOWM_WD [id number] N/A 1 Implanted   PLATE ANT CERV CONSTRND 1 LVL 22MM OZARK VIEW - [id number]  PLATE ANT CERV CONSTRND 1 LVL 22MM OZARK VIEW  [id number]_WD [id number] N/A 1 Implanted   SCREW SPNL L14MM DIA4MM SELF STARTING GARCÍA ANT CERV TI OZARK - [id number]  SCREW SPNL L14MM DIA4MM SELF STARTING GARCÍA ANT CERV TI OZARK  CARLOS SPINE HOW_WD [id number] N/A 4 Implanted       INDICATIONS FOR PROCEDURE:  The patient has had persistent symptoms of cervical radiculopathy despite conservative treatment. The preoperative studies confirmed a concordant stenotic lesion resulting in neural inpingement. The risks, benefits and potential complications of the above listed procedures were discussed with the patient in detail and an informed consent was obtained.     DESCRIPTION OF PROCEDURE:  After adequate induction of general anesthesia the patient was positioned supine on the operating table. A shoulder roll was placed and the shoulders were taped caudally to facilitate intraoperative radiographic imaging. The neck was kept in a neutral position. Care was taken to pad all bony prominences. Preoperative antibiotics were given. The neck was prepped and draped in the usual sterile fashion. A time-out was called to confirm appropriate patient, proposed procedure and proposed incision site. With this confirmation an incision was created over the left anterior lateral aspect of the neck centered near the cricoid cartilage. Dissection was carried down through the platysma using electrocautery. A Amador-Becerra approach was then performed down to the anterior cervical spine. A spinal needle was inserted in an interspace and a cross-table lateral fluoroscopic image was obtained. The appropriate level was marked with electrocautery and the spinal needle was removed. At this point the longus colli was elevated around the periphery of the appropriate disk space and Aguila retractors were  inserted beneath the longus colli. Aguila pins were then inserted in the C5 and C6 vertebral bodies and distraction applied across the annulus fibrosus. The operating microscope was draped and brought to the sterile field. An annulotomy was performed with a 15 blade and a complete diskectomy was performed using pituitary and 3 mm Kerrison. The diskectomy was carried out to the lateral border of the uncovertebral joints bilaterally. A 4 mm bur was then used to trim the anterior osteophytes as well as flatten the vertebral endplates in preparation for arthrodesis. The anterior aspect of the uncovertebral joints were also resected using the bur. The posterior portions of the uncovertebral joints were taken down with a 2 mm Kerrison. An interval was developed in the posterior longitudinal ligament and annulus fibrosus with a micro nerve hook.   A 2 mm Kerrison was then used to resect these structures out to the lateral border of the uncovertebral joints bilaterally. A ball tipped nerve hook was used to palpate laterally and confirm no residual nerve root or spinal cord impingement. This was felt to be satisfactory bilaterally. The interbody sizing system was brought to the field and a size 6  lordotic interbody cage device fit very nicely. The appropriate size cage was selected and filled with allograft and impacted with a tamp and mallet after the wound was liberally irrigated. The anterior cervical plating system was brought to the field and an appropriate size plate was selected and applied across  C5 - C6. The peripheral screw holes were drilled and filled appropriate length screws. The locking mechanism was tightened. C-arm fluoroscopy was brought in and used to obtain AP and lateral images, both of which were felt to be satisfactory for appropriate spinal level, graft and hardware placement. The wound was liberally irrigated. The incision was closed in a layered fashion. Dermabond was applied. Sterile dressings were applied. The patient tolerated the procedure well and was returned to the post anesthesia care unit in stable condition.      Noris Nichole MD

## 2022-01-25 NOTE — ANESTHESIA POSTPROCEDURE EVALUATION
Procedure(s):  C5-C6 SPINE ANTERIOR CERVICAL DISCECTOMY AND FUSION WITH INTERBODY SPACER, ALLOGRAFT AND INSTRUMENTATION. general    Anesthesia Post Evaluation      Multimodal analgesia: multimodal analgesia not used between 6 hours prior to anesthesia start to PACU discharge  Patient location during evaluation: PACU  Patient participation: complete - patient participated  Level of consciousness: awake and alert  Pain management: adequate  Airway patency: patent  Anesthetic complications: no  Cardiovascular status: hemodynamically stable  Respiratory status: acceptable  Hydration status: acceptable        INITIAL Post-op Vital signs:   Vitals Value Taken Time   /63 01/25/22 1026   Temp 36.7 °C (98 °F) 01/25/22 1011   Pulse 82 01/25/22 1029   Resp 14 01/25/22 1011   SpO2 100 % 01/25/22 1029   Vitals shown include unvalidated device data.

## 2022-01-25 NOTE — PERIOP NOTES
Discharge instructions reviewed with pt and significant other who verbalize understanding of follow up care.

## 2022-01-25 NOTE — DISCHARGE INSTRUCTIONS
Cervical (Neck) Fusion Postoperative Home Instructions    - SHOWERING: You may shower the first day you are home with the dressing on. Do not soak in a tub for at least three weeks. Use only soap and water on the incision and pat it dry. Do not scrub the incision.      - SIGNS OF INFECTION: Please notify your physician for any of the following: a temperature greater than 100.5, extreme tenderness at the wound, excessive redness and/or swelling, or large amounts of drainage from the wound. If you think you have a wound infection, call your physician's office immediately.    - SWALLOWING: Don't be alarmed if it seems there is a lump in your throat for several days after surgery- this is normal. Eat only soft foods and drink plenty of fluids until your throat feels better. If you begin having trouble swallowing, call the office immediately. -EATING: Chicot foods today, nothing spicy or greasy.    - DRIVING: You may not begin driving until after your visit to the physician's office for a wound and suture check. This is normally 7-10 days after you come home from the hospital.    - MEDICATIONS: You may not take anti-inflammatory medications. These include over-the-counter ibuprofen products such as Motrin, Advil, Aleve and other related medications or prescription medications such as Ultram, Naproxen, Indocin, or Celebrex and others. These medications slow down the bone healing. You may take Tylenol unless your prescribed medication has Tylenol in it. The pain medicine prescribed may be taken as needed. You should continue taking a stool softener twice a day, drink plenty of water and eat high fiber foods to avoid constipation. This is a common problem patients experience while taking pain medicine. MEDICATION INTERACTION:  During your procedure you potentially received a medication or medications which may reduce the effectiveness of oral contraceptives.  Please consider other forms of contraception for 1 month following your procedure if you are currently using oral contraceptives as your primary form of birth control. In addition to this, we recommend continuing your oral contraceptive as prescribed, unless otherwise instructed by your physician, during this time    - DEEP BREATHING EXERCISES: Continue to use your incentive spirometer for deep breathing exercises. - ACTIVITY: Avoid reaching overhead, particularly to lift things, until you have been told that your fusion has healed. Do not lift objects heavier than a coffee cup or a newspaper. You shouldn't lift anything heavier than 2-5 pounds. When lifting, you should bend with your knees and keep your back straight. Sleeping may be difficult and sometimes requires you to change positions frequently; try to sleep with your head elevated 15-30 degrees. You may turn your head gradually from side to side, but avoid placing your chin to your chest or flexing your head backwards. You may remove your RINA hose when consistently walking. You may do steps and inclines in moderation.    - SEXUAL RELATIONS: You may resume sexual relations 2 weeks after your surgery. - WALKING PROGRAM: After your sutures are removed or dissolved, it is very important that you begin a progressive walking program. Walking strengthens the spinal muscles and will help protect your disc and vertebrae. You will need to increase your walking program up to two miles per day over the next four weeks. You should begin slowly with 1/8 to 1/4 of a mile and add to this each day. Please be very consistent with your walking program, as this is a vital part of your recovery.    - SYMPTOMS AFTER SURGERY: Don't be alarmed if you still have some of the same symptoms you had prior to surgery. The nerves often require time to heal after the pressure has been relieved. You may also experience pain between your shoulder blades which is common after this surgery.  The level of pain you experience should improve as your body heals. Please call our office if you have any other questions or problems. Listen to your body; it will tell you if you are overdoing it. Use common sense and take care of yourself! After general anesthesia or intravenous sedation, for 24 hours or while taking prescription Narcotics:  · Limit your activities  · A responsible adult needs to be with you for the next 24 hours  · Do not drive and operate hazardous machinery  · Do not make important personal or business decisions  · Do not drink alcoholic beverages  · If you have not urinated within 8 hours after discharge, and you are experiencing discomfort from urinary retention, please go to the nearest ED. · If you have sleep apnea and have a CPAP machine, please use it for all naps and sleeping. · Please use caution when taking narcotics and any of your home medications that may cause drowsiness. *  Please give a list of your current medications to your Primary Care Provider. *  Please update this list whenever your medications are discontinued, doses are      changed, or new medications (including over-the-counter products) are added. *  Please carry medication information at all times in case of emergency situations. These are general instructions for a healthy lifestyle:  No smoking/ No tobacco products/ Avoid exposure to second hand smoke  Surgeon General's Warning:  Quitting smoking now greatly reduces serious risk to your health. Obesity, smoking, and sedentary lifestyle greatly increases your risk for illness  A healthy diet, regular physical exercise & weight monitoring are important for maintaining a healthy lifestyle    You may be retaining fluid if you have a history of heart failure or if you experience any of the following symptoms:  Weight gain of 3 pounds or more overnight or 5 pounds in a week, increased swelling in our hands or feet or shortness of breath while lying flat in bed.   Please call your doctor as soon as you notice any of these symptoms; do not wait until your next office visit.     Yojana Fox  or  Anisha Yen.   Phone: [phone]  Fax: [phone]

## 2022-03-18 PROBLEM — N92.1 MENOMETRORRHAGIA: Status: ACTIVE | Noted: 2017-02-13

## 2022-03-18 PROBLEM — M48.02 CERVICAL SPINAL STENOSIS: Status: ACTIVE | Noted: 2022-01-25

## 2022-03-19 PROBLEM — N80.9 ENDOMETRIOSIS: Status: ACTIVE | Noted: 2017-02-13

## 2022-03-19 PROBLEM — M47.14 THORACIC MYELOPATHY: Status: ACTIVE | Noted: 2021-07-13

## 2022-03-19 PROBLEM — N60.09 BREAST CYST: Status: ACTIVE | Noted: 2018-04-13

## 2022-03-19 PROBLEM — S24.143A: Status: ACTIVE | Noted: 2021-08-16

## 2022-03-20 PROBLEM — R25.2 SPASTICITY: Status: ACTIVE | Noted: 2021-08-16

## 2022-03-20 PROBLEM — G95.9 MYELOPATHY (HCC): Status: ACTIVE | Noted: 2022-01-25

## 2022-03-20 PROBLEM — R92.2 DENSE BREAST TISSUE: Status: ACTIVE | Noted: 2020-06-19

## 2022-03-20 PROBLEM — R31.9 HEMATURIA: Status: ACTIVE | Noted: 2017-02-13

## 2022-03-20 PROBLEM — R92.30 DENSE BREAST TISSUE: Status: ACTIVE | Noted: 2020-06-19

## 2022-03-20 PROBLEM — G95.81 CONUS MEDULLARIS SYNDROME (HCC): Status: ACTIVE | Noted: 2021-07-13

## 2022-06-17 RX ORDER — NORGESTIMATE AND ETHINYL ESTRADIOL 0.25-0.035
1 KIT ORAL DAILY
Qty: 1 PACKET | Refills: 1 | Status: SHIPPED | OUTPATIENT
Start: 2022-06-17 | End: 2022-06-21 | Stop reason: ALTCHOICE

## 2022-06-21 ENCOUNTER — OFFICE VISIT (OUTPATIENT)
Dept: OBGYN CLINIC | Age: 52
End: 2022-06-21

## 2022-06-21 VITALS
HEIGHT: 66 IN | BODY MASS INDEX: 22.82 KG/M2 | WEIGHT: 142 LBS | DIASTOLIC BLOOD PRESSURE: 86 MMHG | SYSTOLIC BLOOD PRESSURE: 148 MMHG

## 2022-06-21 DIAGNOSIS — Z01.419 WELL WOMAN EXAM WITH ROUTINE GYNECOLOGICAL EXAM: Primary | ICD-10-CM

## 2022-06-21 DIAGNOSIS — Z12.31 ENCOUNTER FOR SCREENING MAMMOGRAM FOR BREAST CANCER: ICD-10-CM

## 2022-06-21 DIAGNOSIS — Z12.4 SCREENING FOR CERVICAL CANCER: ICD-10-CM

## 2022-06-21 DIAGNOSIS — Z11.51 SCREENING FOR HUMAN PAPILLOMAVIRUS (HPV): ICD-10-CM

## 2022-06-21 PROCEDURE — 99396 PREV VISIT EST AGE 40-64: CPT | Performed by: OBSTETRICS & GYNECOLOGY

## 2022-06-21 RX ORDER — ACETAMINOPHEN AND CODEINE PHOSPHATE 120; 12 MG/5ML; MG/5ML
1 SOLUTION ORAL DAILY
Qty: 84 TABLET | Refills: 4 | Status: SHIPPED | OUTPATIENT
Start: 2022-06-21

## 2022-06-21 NOTE — PROGRESS NOTES
CC:  Annual     HPI:  47 y.o.  G0 presents today for a routine gynecological examination. Patient's last menstrual period was 2022. Self Pay. PCP: Dr Blue Price    Also followed by Ortho and neuro for spastic cervical myelopathy, now s/p anterior cervical discectomy and fusion surgery w/ significant difficulty with ambulation. Contraception:  Sprintec OCPs  (continuous fashion)  Taking OCPs continuously for hx endometriosis (dx via laparoscopy x2) and hx HMB/IMB.   Hx uterine Fibroids   Not interested in hysterectomy     On occasional BTB w/ this regimen -- doing well        bp elevated today -- discussed       Last TVUS 19 :  Uterus 83mL  EMS 3.64mm  At least 3 fibroids, all intramural  Largest 1.4cm, just posterior to endometrial lining   ovs wnl other than tiny calcifications noted on the ovarian stroma  Prominent bowel just next to the ovary         EMB  (21) wnl---performed secondary to Neg Cotesting but + endometrial cells     Rare periods w/ this regimen   (usually able to skip 3-5 months at a time before light BTB/spotting.      BMI 23            no post coital VB, no dyspareunia.              Single   Sexually active w/ same partner x 4yrs   No changes in sexual partners --declines STD testing        Vasomotor sxs: none  Vaginal dryness: none          GYN hx:  As per HPI    Last Pap smear result: 6/15/21 --Neg Cotesting but + endometrial cells   (EMB wnl)     Abnormal Pap history: only as above       Last MGM  BI-RADS 2, BD 3-- 21    Last Colonoscopy 2yrs ago --  Good x 10yrs     Zoster Vaccine (age 49):  No            OB History    Para Term  AB Living   0 0 0 0 0 0   SAB IAB Ectopic Molar Multiple Live Births   0 0 0 0 0 0          PMH:  Past Medical History:   Diagnosis Date    Adverse effect of anesthesia     BP dropped during a colonoscopy x [address], no problem with other colonoscopy    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 ([facility] 8/16/2021    Conus medullaris syndrome ([facility] [address] 07/13/2021    Endometriosis determined by laparoscopy     Fibroids     Fibromyalgia     Fibromyalgia     High cholesterol     Pancreatitis     x5- acute attacks; no problems in past 2 years    Thoracic myelopathy 7/13/2021    Vitamin B 12 deficiency     B12 injections       PSH:  Past Surgical History:   Procedure Laterality Date    COLONOSCOPY      GYN  1990s    laparoscopy    HEENT      right ear, stapes replaced    ORTHOPEDIC SURGERY      carpal tunnel release, right    ORTHOPEDIC SURGERY Right     foot surgery         Allergies:  No Known Allergies      Social Hx:    reports that she has never smoked. She has never used smokeless tobacco. She reports that she does not drink alcohol and does not use drugs. .    Family Hx:  Family History   Problem Relation Age of Onset    Other Neg Hx     Breast Cancer Neg Hx     Post-op Cognitive Dysfunction Neg Hx     Emergence Delirium Neg Hx     Post-op Nausea/Vomiting Neg Hx     Delayed Awakening Neg Hx     Pseudochol. Deficiency Neg Hx     Malig Hypertherm Neg Hx     Diabetes Maternal Grandmother     Hypertension Maternal Grandmother     Heart Disease Maternal Grandmother     Cancer Mother     Hypertension Mother          Review of Systems   Neg except as above         Physical Exam      BP (!) 148/86   Ht 5' 6\" (1.676 m)   Wt 142 lb (64.4 kg)   LMP 05/22/2022   BMI 22.92 kg/m²     Constitutional: She appears well-developed and well-nourished. No distress. HENT:    Head: Normocephalic and atraumatic. Neck: Normal range of motion. No thyromegaly present. Cardiovascular: Normal rate, regular rhythm and normal heart sounds. Exam reveals no gallop and no friction rub. No murmur heard. Pulmonary/Chest: Effort normal and breath sounds normal. No respiratory distress. She has no wheezes. She has no rales. Abdominal: Soft.  Bowel sounds are normal. She exhibits no distension and no mass. There is no tenderness. There is no rebound and no guarding. Skin: She is not diaphoretic. Psychiatric: She has a normal mood and affect.  Her behavior is normal. Thought content normal. .    Pelvic:   External genitalia wnl, no lesions, rashes  Clitoris and urethra midline  Vagina pink, moist, well rugated       Cervix without lesion/masses, DC wnl, no CMT   Uterus normal in size and contour, no masses, NTTP  Adnexa without masses, NTTP    Breasts:   Symmetric, no lesions, masses, rashes, no abnl nipple Dc       Counseling:  Discussed General Recommendations (pts 40-65yo):  -Routine Pap  (unless cervix removed for benign reasons and not hx high grade dysplasia [ie VICTORIANO 2-3])  -Routine STD testing for high risk individuals   -Routine Mammogram   -lipid profile every 5 yrs  -colonoscopy (>/=46yo)  -Tdap once and then Td every 10yrs  -TSH every 5 years (>50)  -Zoster Vaccine (age 61), single dose ZVL or (age 48) 2 doses RZV  -Influenza Vaccine, annually  -Healthy eating/exercise   -HPV vaccine newest recs (10yo-46yo)         ASSESSMENT/PLAN:   47 y.o. G0 for annual GYN exam:    1) Routine:    -Cotesting today    -declines STD testing    -Rx Micronor OCP ; DC Sprintec    -3D  Mammogram   referral   -safe sexual practices    -FU w/ PCP for all non-GYN medical issues and regular screening for lipids,TSH, diabetes           2) hx Endometriosis, fibroid; Elevated bp:   -FU w/ PCP for bp surveillance   -DC COCP-->rx Micronor         Sherron Chiang MD

## 2022-06-30 ENCOUNTER — OFFICE VISIT (OUTPATIENT)
Dept: ORTHOPEDIC SURGERY | Age: 52
End: 2022-06-30

## 2022-06-30 DIAGNOSIS — R26.89 BALANCE PROBLEM: ICD-10-CM

## 2022-06-30 DIAGNOSIS — M54.12 CERVICAL RADICULOPATHY: ICD-10-CM

## 2022-06-30 DIAGNOSIS — R27.8 DECREASED DEXTERITY: ICD-10-CM

## 2022-06-30 DIAGNOSIS — Z98.1 STATUS POST CERVICAL SPINAL ARTHRODESIS: Primary | ICD-10-CM

## 2022-06-30 LAB
CYTOLOGIST CVX/VAG CYTO: NORMAL
CYTOLOGY CVX/VAG DOC THIN PREP: NORMAL
HPV APTIMA: NEGATIVE
Lab: NORMAL
Lab: NORMAL
PATH REPORT.FINAL DX SPEC: NORMAL
STAT OF ADQ CVX/VAG CYTO-IMP: NORMAL

## 2022-06-30 PROCEDURE — 99213 OFFICE O/P EST LOW 20 MIN: CPT | Performed by: ORTHOPAEDIC SURGERY

## 2022-06-30 NOTE — PROGRESS NOTES
Name: Corbin Egan  YOB: 1970  Gender: female  MRN: [id number]  Age: 47 y.o. Chief Complaint: Neck surgery follow up. History of present illness:    Corbin Egan is here for 7-month follow up of her  anterior cervical discectomy and fusion surgery which was done for severe cord compression and myelopathy. The patient had an excellent result postoperatively and maintained that for the first several months. However, over the last couple of months she has had a dramatic worsening with no explanation. She reports that both legs are very weak especially the left side which is hypersensitive. The right side spasms frequently. She really has a lot of difficulty stooping down or getting up out of a chair without help. She has significant right-sided low back pain. She is noted a foot drop on her right side. She has also lost bowel and bladder control and has to wear depends constantly. When trying to sleep she has twitching and spasming in her legs that are uncontrollable. She falls very frequently at least 1 or 2 times every week. She says that this is a dramatic change from her initial postoperative condition. .      Medications:   Current Outpatient Medications   Medication Sig    norethindrone (ORTHO MICRONOR) 0.35 MG tablet Take 1 tablet by mouth daily    hydrocortisone 1 % lotion Apply topically 2 times daily as needed     No current facility-administered medications for this visit. Allergies:   No Known Allergies     Physical Exam:     Oriented to person, place and time. Mood and affect are appropriate. Respirations are unlabored and there is no evidence of cyanosis. Wound is healed up nicely without erythema or underlying fluctuance. Sensory testing reveals intact sensation to light touch with the exception of subjective light touch sensation in both ulnar hands and also the right leg in a rather diffuse pattern. .    Strength testing in all 4 extremities reveals 5/5 strength everywhere except for right ankle dorsiflexion where there is 3/5 strength. She is quite spastic with positive Shaye's, inverted brachioradialis reflex, and ankle clonus noted bilaterally. Her gait is spastic. Radiographic Studies:    Radiographs:   2 views cervical spine (AP and Lateral) taken today, reveal postoperative changes status post instrumented fusion with excellent graft and hardware placement, and no interval decompensation from the immediate postoperative appearance. Radiographic Impression: Satisfactory postoperative appearance, status post cervical fusion. Diagnosis:      ICD-10-CM    1. Status post cervical spinal arthrodesis  Z98.1 XR CERVICAL SPINE (2-3 VIEWS)   2. Cervical radiculopathy  M54.12    3. Balance problem  R26.89    4. Decreased dexterity  R27.8        Assessment/Plan:       She has spastic myelopathy and the new development of a right sided foot drop. She is falling frequently. She has bladder and bowel incontinence. It is curious why she had gotten so much better postoperatively for several months and now has taken an acute turn for the worse. Certainly, this is very functionally debilitating as outlined in the HPI. I would recommend an MRI of the spinal axis including cervical, thoracic, and lumbar images for further evaluation.         Electronically Signed By Daniel Hussein MD     8:35 AM

## 2022-07-14 ENCOUNTER — TELEPHONE (OUTPATIENT)
Dept: ORTHOPEDIC SURGERY | Age: 52
End: 2022-07-14

## 2022-07-14 NOTE — TELEPHONE ENCOUNTER
Pt called she wants her mri at [facility]   Please call her ,she upset inversion   Need more inf because of stapes inplant/  She does not want to wait,please call her   She has had a mri at [facility] before

## 2022-07-14 NOTE — TELEPHONE ENCOUNTER
Spoke with patient today regarding mri's at [facility]. She is scheduled for tomorrow but has some complications with an ear implant.  She will let me know tomorrow if [facility] can not perform these mri's and I will fax them to [facility]

## 2022-07-15 ENCOUNTER — PATIENT MESSAGE (OUTPATIENT)
Dept: ORTHOPEDIC SURGERY | Age: 52
End: 2022-07-15

## 2022-07-15 ENCOUNTER — TELEPHONE (OUTPATIENT)
Dept: ORTHOPEDIC SURGERY | Age: 52
End: 2022-07-15

## 2022-07-15 NOTE — TELEPHONE ENCOUNTER
Already spoke with patient in regards to sending to [facility] due to [facility] not being able to do the mri's.  She has been notified through [employer] that this has been faxed

## 2022-07-15 NOTE — TELEPHONE ENCOUNTER
Pt called and [facility] do her mri because she has a implant. She needs done at the hospital. Please call pt.

## 2022-07-25 ENCOUNTER — TELEPHONE (OUTPATIENT)
Dept: ORTHOPEDIC SURGERY | Age: 52
End: 2022-07-25

## 2022-07-25 NOTE — TELEPHONE ENCOUNTER
Patient is scheduled for MRI tomorrow but ins is requesting a peer 2 peer call ins# [phone] tracking # [id number] per Thalia Gomez with  ins verification # [phone](KF need)

## 2022-07-26 ENCOUNTER — HOSPITAL ENCOUNTER (OUTPATIENT)
Dept: MRI IMAGING | Age: 52
Discharge: HOME | End: 2022-07-29
Payer: COMMERCIAL

## 2022-07-26 DIAGNOSIS — M54.12 CERVICAL RADICULOPATHY: ICD-10-CM

## 2022-07-26 DIAGNOSIS — Z98.1 STATUS POST CERVICAL SPINAL ARTHRODESIS: ICD-10-CM

## 2022-07-26 DIAGNOSIS — R26.89 BALANCE PROBLEM: ICD-10-CM

## 2022-07-26 DIAGNOSIS — R27.8 DECREASED DEXTERITY: ICD-10-CM

## 2022-07-26 PROCEDURE — 72146 MRI CHEST SPINE W/O DYE: CPT

## 2022-07-26 PROCEDURE — 72141 MRI NECK SPINE W/O DYE: CPT

## 2022-07-26 PROCEDURE — 72148 MRI LUMBAR SPINE W/O DYE: CPT

## 2022-07-28 ENCOUNTER — PATIENT MESSAGE (OUTPATIENT)
Dept: ORTHOPEDIC SURGERY | Age: 52
End: 2022-07-28

## 2022-07-28 RX ORDER — NORGESTIMATE AND ETHINYL ESTRADIOL 0.25-0.035
KIT ORAL
Qty: 28 TABLET | Refills: 1 | OUTPATIENT
Start: 2022-07-28

## 2022-08-04 ENCOUNTER — HOSPITAL ENCOUNTER (OUTPATIENT)
Dept: MAMMOGRAPHY | Age: 52
Discharge: HOME | End: 2022-08-07
Payer: COMMERCIAL

## 2022-08-04 ENCOUNTER — OFFICE VISIT (OUTPATIENT)
Dept: ORTHOPEDIC SURGERY | Age: 52
End: 2022-08-04
Payer: COMMERCIAL

## 2022-08-04 DIAGNOSIS — R26.89 BALANCE PROBLEM: ICD-10-CM

## 2022-08-04 DIAGNOSIS — R27.8 DECREASED DEXTERITY: Primary | ICD-10-CM

## 2022-08-04 DIAGNOSIS — Z12.31 ENCOUNTER FOR SCREENING MAMMOGRAM FOR BREAST CANCER: ICD-10-CM

## 2022-08-04 PROCEDURE — 99213 OFFICE O/P EST LOW 20 MIN: CPT | Performed by: ORTHOPAEDIC SURGERY

## 2022-08-04 PROCEDURE — 77063 BREAST TOMOSYNTHESIS BI: CPT

## 2022-08-04 NOTE — PROGRESS NOTES
Name: Lucio Telles  YOB: 1970  Gender: female  MRN: [id number]  Age: 47 y.o. History of present illness:     She is here for follow-up on her MRIs. Again, she had an anterior cervical discectomy and fusion done for severe cord compression and myelopathy. She did well initially but then recently has had dramatic worsening in leg weakness and ataxia. Physical Exam:    She has a very unsteady gait. She has weakness in right ankle dorsiflexion. Radiographic Studies:     Cervical, thoracic, and lumbar MRIs were reviewed. She continues to have a cord deformity at C5-C6 despite an adequate decompression. She also has some myelomalacia here. The lumbar spine is fairly unremarkable except for some left-sided L4 foraminal stenosis. The thoracic spine shows abnormal T2 signal change in the cord in the lower thoracic area but without definitive evidence for vascular malformation. Diagnosis:      ICD-10-CM    1. Decreased dexterity  R27.8       2. Balance problem  R26.89           Assessment/Plan:      Her cervical and lumbar MRIs are fairly reassuring. I suspect she is primarily having residual symptoms of myelopathy. However, there is a subtle finding in her thoracic MRI and the radiologist has recommended repeating the study with contrast so we will go ahead and order that study.            Electronically Signed By Ema Kaur MD     11:00 AM

## 2022-08-07 PROBLEM — R92.30 DENSE BREAST TISSUE ON MAMMOGRAM: Status: ACTIVE | Noted: 2020-06-19

## 2022-08-07 PROBLEM — R92.2 DENSE BREAST TISSUE ON MAMMOGRAM: Status: ACTIVE | Noted: 2020-06-19

## 2022-08-07 NOTE — RESULT ENCOUNTER NOTE
BiRads 2, BD4  BD4 indicates extremely dense breast tissue which can affect detection of early breast cancers. No family hx of breast cancer. If pt desired FU breast MRI she can request this but would recommend checking with her insurance company first for coverage. Would also recommend getting 3D mammo next year. Breast density information should have been mailed to the pt already. Call with any questions.

## 2022-08-18 ENCOUNTER — HOSPITAL ENCOUNTER (OUTPATIENT)
Dept: MRI IMAGING | Age: 52
Discharge: HOME | End: 2022-08-21
Payer: COMMERCIAL

## 2022-08-18 DIAGNOSIS — R26.89 BALANCE PROBLEM: ICD-10-CM

## 2022-08-18 DIAGNOSIS — R27.8 DECREASED DEXTERITY: ICD-10-CM

## 2022-08-18 PROCEDURE — 72157 MRI CHEST SPINE W/O & W/DYE: CPT

## 2022-08-18 PROCEDURE — A9579 GAD-BASE MR CONTRAST NOS,1ML: HCPCS | Performed by: ORTHOPAEDIC SURGERY

## 2022-08-18 PROCEDURE — 6360000004 HC RX CONTRAST MEDICATION: Performed by: ORTHOPAEDIC SURGERY

## 2022-08-18 RX ADMIN — GADOTERIDOL 13 ML: 279.3 INJECTION, SOLUTION INTRAVENOUS at 21:13

## 2022-08-19 ENCOUNTER — PATIENT MESSAGE (OUTPATIENT)
Dept: ORTHOPEDIC SURGERY | Age: 52
End: 2022-08-19

## 2022-08-19 DIAGNOSIS — R27.8 DECREASED DEXTERITY: Primary | ICD-10-CM

## 2022-08-19 DIAGNOSIS — R26.89 BALANCE PROBLEM: ICD-10-CM

## 2022-09-08 ENCOUNTER — PATIENT MESSAGE (OUTPATIENT)
Dept: NEUROLOGY | Age: 52
End: 2022-09-08

## 2022-09-16 ENCOUNTER — OFFICE VISIT (OUTPATIENT)
Dept: NEUROLOGY | Age: 52
End: 2022-09-16
Payer: COMMERCIAL

## 2022-09-16 VITALS
SYSTOLIC BLOOD PRESSURE: 123 MMHG | HEART RATE: 89 BPM | HEIGHT: 66 IN | WEIGHT: 142 LBS | BODY MASS INDEX: 22.82 KG/M2 | DIASTOLIC BLOOD PRESSURE: 77 MMHG

## 2022-09-16 DIAGNOSIS — R25.2 SPASTICITY: ICD-10-CM

## 2022-09-16 DIAGNOSIS — E53.8 B12 DEFICIENCY: ICD-10-CM

## 2022-09-16 DIAGNOSIS — N32.89 SPASTIC BLADDER: ICD-10-CM

## 2022-09-16 DIAGNOSIS — R20.0 NUMBNESS IN LEFT LEG: ICD-10-CM

## 2022-09-16 DIAGNOSIS — G95.9 CERVICAL MYELOPATHY (HCC): Primary | ICD-10-CM

## 2022-09-16 DIAGNOSIS — K59.9 BOWEL DYSFUNCTION: ICD-10-CM

## 2022-09-16 DIAGNOSIS — R29.898 WEAKNESS OF BOTH LEGS: ICD-10-CM

## 2022-09-16 LAB — VIT B12 SERPL-MCNC: 437 PG/ML (ref 193–986)

## 2022-09-16 PROCEDURE — 99215 OFFICE O/P EST HI 40 MIN: CPT | Performed by: PSYCHIATRY & NEUROLOGY

## 2022-09-16 RX ORDER — TOLTERODINE 4 MG/1
4 CAPSULE, EXTENDED RELEASE ORAL DAILY
Qty: 30 CAPSULE | Refills: 2 | Status: SHIPPED | OUTPATIENT
Start: 2022-09-16

## 2022-09-16 ASSESSMENT — VISUAL ACUITY: VA_NORMAL: 1

## 2022-09-16 ASSESSMENT — ENCOUNTER SYMPTOMS
GASTROINTESTINAL NEGATIVE: 1
RESPIRATORY NEGATIVE: 1
EYES NEGATIVE: 1

## 2022-09-16 NOTE — PROGRESS NOTES
9/16/2022  Claudia Mathis 47 y.o. female      Chief Complaint:  Chief Complaint   Patient presents with    Follow-up    Neurologic Problem     Trouble walking, no bladder control, numbness in lower extremities          Followup Note:   Patient was seen from right leg weakness and left maykel below numbness. TS MRI neg, CS MRI showed severe spinal stenosis, urgent surgery was performed on 1/25/22 by Dr Leyla Perez. Received PT 9 weeks. Patient reported better condition of improved walking, numbness unchanged after surgery, but then 2-3 months later around April symptoms got worse. 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 She experienced bladder problem around April, unable to push bowel movement or control, right leg weakness got worse, left side numbness now plus hypersensitive, developed bilateral leg jerking movement. Fell x 2 at work without warning. Review Test Results: I have reviewed imaging study and lab tests, discussed results with patient in detail. CS MRI 7/27/22  Impression       1. Status post interval anterior discectomy and fusion at C5-6-4 previously   noted large disc herniation. Some residual T2 signal lateral right cord possibly   myelomalacia related to previous mass effect due to the herniated disc. Remainder of the cord is unremarkable. No residual spinal canal or neural   foraminal stenosis. LS MRI 7/27/22  Impression       1. No significant spinal canal or right neural foraminal stenosis throughout the   lumbar spine. Mild left neural foraminal stenosis at L3-4 and L4-5. A foraminal   and lateral left disc bulge at L2-3 likely causes mass effect upon the exiting   L3 nerve root. No significant neural foraminal stenosis.          Current Outpatient Medications   Medication Sig Dispense Refill    tolterodine (DETROL LA) 4 MG extended release capsule Take 1 capsule by mouth daily 30 capsule 2    norethindrone (ORTHO MICRONOR) 0.35 MG tablet Take 1 tablet by mouth daily 84 tablet 4    hydrocortisone 1 % lotion Apply topically 2 times daily as needed       No current facility-administered medications for this visit. No Known Allergies      Review of Systems:  Review of Systems   Constitutional: Negative. HENT: Negative. Eyes: Negative. Respiratory: Negative. Gastrointestinal: Negative. Genitourinary: Negative. Musculoskeletal:  Positive for falls and neck pain. Skin: Negative. Neurological:  Positive for tingling, sensory change and weakness. Endo/Heme/Allergies: Negative. Psychiatric/Behavioral: Negative. Examination:  Vitals:    09/16/22 1357   BP: 123/77   Pulse: 89   Weight: 142 lb (64.4 kg)   Height: 5' 6\" (1.676 m)        Physical Exam  Vitals reviewed. Constitutional:       Appearance: She is normal weight. Eyes:      Extraocular Movements: Extraocular movements intact and EOM normal.      Conjunctiva/sclera: Conjunctivae normal.      Pupils: Pupils are equal, round, and reactive to light. Cardiovascular:      Rate and Rhythm: Normal rate. Pulmonary:      Effort: Pulmonary effort is normal.   Musculoskeletal:         General: Normal range of motion. Cervical back: Normal range of motion. Skin:     General: Skin is warm and dry. Comments: Temperature (tuning fork) sensation absent on the left, pp reduced left leg up to T10 level then above became hypersensitive. Neurological:      Mental Status: She is alert and oriented to person, place, and time. Cranial Nerves: No cranial nerve deficit. Sensory: Sensory deficit present. Motor: Weakness present.       Coordination: Coordination normal. Finger-Nose-Finger Test and Romberg Test normal.      Gait: Gait abnormal.      Deep Tendon Reflexes: Strength normal. Reflexes abnormal.      Reflex deltoid: 5/5  Right biceps: 5/5  Left biceps: 5/5  Right triceps: 5/5  Left triceps: 5/5  Right wrist flexion: 5/5  Left wrist flexion: 5/5  Right wrist extension: 5/5  Left wrist extension: 5/5  Right interossei: 5/5  Left interossei: 5/5  Right iliopsoas: 3/5  Left iliopsoas: 3/5  Right quadriceps: 4/5  Left quadriceps: 5/5  Right hamstrin/5  Left hamstrin/5  Right glutei: 5/5  Left glutei: 5/5  Right anterior tibial: 4/5  Left anterior tibial: 5/5  Right posterior tibial: 4/5  Left posterior tibial: 4/5  Right peroneal: 4/5  Left peroneal: 4/5  Right gastroc: 5/5  Left gastroc: 5/5  Finger tapping normal. Tone significantly increased on right quadriceps. Sensory Exam   Left leg light touch: decreased from knee  Right arm vibration: normal  Left arm vibration: normal  Right leg vibration: normal  Left leg vibration: decreased from knee  Right arm pinprick: decreased from fingers  Left arm pinprick: decreased from fingers  Lowest sensation to pinprick on left: T10    Remote CTS release both sides     Gait, Coordination, and Reflexes     Gait  Gait: circumduction    Coordination   Romberg: negative  Finger to nose coordination: normal    Tremor   Resting tremor: absent  Intention tremor: absent  Action tremor: absent    Reflexes   Right brachioradialis: 2+  Left brachioradialis: 2+  Right biceps: 2+  Left biceps: 2+  Right triceps: 2+  Left triceps: 2+  Right patellar: 3+  Left patellar: 2+  Right achilles: 3+  Left achilles: 3+  Right plantar: upgoing  Left plantar: upgoing  Right Rae: present  Left Rae: absent  Gait right spastic and dragging, slow and unsteady. Right lower limb DTR brisker, 2-3 ankle clonus bilaterally. Rae + on R, Babinski + bilaterally. Assessment / Plan:    Norbert Saldana was seen today for follow-up and neurologic problem. Diagnoses and all orders for this visit:    Cervical myelopathy ([facility] [address]  -     MRI BRAIN W WO CONTRAST;  Future    Weakness of both legs  -     MRI

## 2022-09-19 ENCOUNTER — PATIENT MESSAGE (OUTPATIENT)
Dept: NEUROLOGY | Age: 52
End: 2022-09-19

## 2022-09-21 ENCOUNTER — PATIENT MESSAGE (OUTPATIENT)
Dept: NEUROLOGY | Age: 52
End: 2022-09-21

## 2022-09-22 NOTE — TELEPHONE ENCOUNTER
[employer] lvm that they are needing some medical info regarding short term disability. Call back number is [phone].

## 2022-09-26 ENCOUNTER — HOSPITAL ENCOUNTER (OUTPATIENT)
Dept: MRI IMAGING | Age: 52
Discharge: HOME | End: 2022-09-29
Payer: COMMERCIAL

## 2022-09-26 DIAGNOSIS — R29.898 WEAKNESS OF BOTH LEGS: ICD-10-CM

## 2022-09-26 DIAGNOSIS — R25.2 SPASTICITY: ICD-10-CM

## 2022-09-26 DIAGNOSIS — G95.9 CERVICAL MYELOPATHY (HCC): ICD-10-CM

## 2022-09-26 PROCEDURE — 70553 MRI BRAIN STEM W/O & W/DYE: CPT

## 2022-09-26 PROCEDURE — 6360000004 HC RX CONTRAST MEDICATION: Performed by: PSYCHIATRY & NEUROLOGY

## 2022-09-26 PROCEDURE — A9579 GAD-BASE MR CONTRAST NOS,1ML: HCPCS | Performed by: PSYCHIATRY & NEUROLOGY

## 2022-09-26 PROCEDURE — 2580000003 HC RX 258: Performed by: PSYCHIATRY & NEUROLOGY

## 2022-09-26 RX ORDER — SODIUM CHLORIDE 0.9 % (FLUSH) 0.9 %
10 SYRINGE (ML) INJECTION AS NEEDED
Status: DISCONTINUED | OUTPATIENT
Start: 2022-09-26 | End: 2022-09-30 | Stop reason: HOSPADM

## 2022-09-26 RX ADMIN — GADOTERIDOL 12 ML: 279.3 INJECTION, SOLUTION INTRAVENOUS at 20:34

## 2022-09-26 RX ADMIN — SODIUM CHLORIDE, PRESERVATIVE FREE 10 ML: 5 INJECTION INTRAVENOUS at 20:34

## 2022-10-05 ENCOUNTER — OFFICE VISIT (OUTPATIENT)
Dept: GASTROENTEROLOGY | Age: 52
End: 2022-10-05
Payer: COMMERCIAL

## 2022-10-05 VITALS
OXYGEN SATURATION: 100 % | SYSTOLIC BLOOD PRESSURE: 143 MMHG | WEIGHT: 136 LBS | BODY MASS INDEX: 21.86 KG/M2 | DIASTOLIC BLOOD PRESSURE: 76 MMHG | TEMPERATURE: 97.9 F | HEART RATE: 101 BPM | HEIGHT: 66 IN

## 2022-10-05 DIAGNOSIS — R15.0 INCOMPLETE DEFECATION: ICD-10-CM

## 2022-10-05 DIAGNOSIS — R19.4 CHANGE IN BOWEL HABIT: Primary | ICD-10-CM

## 2022-10-05 PROCEDURE — 99203 OFFICE O/P NEW LOW 30 MIN: CPT | Performed by: INTERNAL MEDICINE

## 2022-10-05 RX ORDER — POLYETHYLENE GLYCOL 3350, SODIUM SULFATE ANHYDROUS, SODIUM BICARBONATE, SODIUM CHLORIDE, POTASSIUM CHLORIDE 236; 22.74; 6.74; 5.86; 2.97 G/4L; G/4L; G/4L; G/4L; G/4L
4 POWDER, FOR SOLUTION ORAL ONCE
Qty: 4000 ML | Refills: 0 | Status: SHIPPED | OUTPATIENT
Start: 2022-10-05 | End: 2022-10-05

## 2022-10-05 ASSESSMENT — ENCOUNTER SYMPTOMS
SHORTNESS OF BREATH: 0
TROUBLE SWALLOWING: 0
ABDOMINAL PAIN: 0
BLOOD IN STOOL: 0
COLOR CHANGE: 0
VOMITING: 0
CONSTIPATION: 1

## 2022-10-05 NOTE — H&P (VIEW-ONLY)
Garrison Phillips (:  1970) is a 47 y.o. female, new patient here for evaluation of the following chief complaint(s):  Constipation (Very irregular )         ASSESSMENT/PLAN:  1. Change in bowel habit  -     PEG 3350-KCl-NaBcb-NaCl-NaSulf (PEG-3350/ELECTROLYTES) 236 g SOLR; Take 4 L by mouth once for 1 dose, Disp-4000 mL, R-0Normal  2. Incomplete defecation    We will proceed with colonoscopic evaluation if that is negative she might need a rectal manometry study. Subjective   SUBJECTIVE/OBJECTIVE  Presenting with bowel changes and defecation abnormality. She had had a discoid ectomy for near complete compression of her spinal cord she recovered and then she is having relapsing symptoms. She is having severe weakness and no sensation on the left side of her body. They have been followed by a neurologist at this moment work-up is still in progress. She has hard time having a bowel movement she can feel that she needs to have a bowel movement and she has a hard time pushing and emptying her bowels. She has some time to digitally remove her stools. She has a bowel movement every 7 to 10 days. She tried to take some laxative ended up with severe diarrhea and stool incontinence. She also has a bladder urine incontinence and she has to wear a diaper.   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    Conus medullaris syndrome ([address] 2021    Endometriosis determined by laparoscopy     Fibroids     Fibromyalgia     Fibromyalgia     High cholesterol     Pancreatitis     x5- acute attacks; no problems in past 2 years    Thoracic myelopathy 2021    Vitamin B 12 deficiency     B12 injections       Past Surgical History:   Procedure Laterality Date    COLONOSCOPY      GYN  1990s    laparoscopy    HEENT      right ear, stapes replaced    ORTHOPEDIC SURGERY      carpal tunnel release, right    ORTHOPEDIC SURGERY Right     foot surgery             No Known Allergies       Review of Systems   Constitutional:  Negative for appetite change. HENT:  Negative for mouth sores and trouble swallowing. Respiratory:  Negative for shortness of breath. Cardiovascular:  Negative for chest pain. Gastrointestinal:  Positive for constipation. Negative for abdominal pain, blood in stool and vomiting. Musculoskeletal:  Positive for gait problem and neck pain. Skin:  Negative for color change. Allergic/Immunologic: Negative for food allergies. Neurological:  Positive for weakness and numbness. Negative for seizures. Hematological:  Does not bruise/bleed easily. Objective   Physical Exam  HENT:      Head: Normocephalic. Mouth/Throat:      Mouth: Mucous membranes are moist.   Eyes:      General: No scleral icterus. Cardiovascular:      Rate and Rhythm: Normal rate and regular rhythm. Pulmonary:      Effort: No respiratory distress. Abdominal:      General: There is no distension. Tenderness: There is no abdominal tenderness. There is no rebound. Lymphadenopathy:      Cervical: No cervical adenopathy. Skin:     Coloration: Skin is not jaundiced. Findings: No bruising. Neurological:      Mental Status: She is alert and oriented to person, place, and time.             Current Outpatient Medications   Medication Sig Dispense Refill    PEG 3350-KCl-NaBcb-NaCl-NaSulf (PEG-3350/ELECTROLYTES) 236 g SOLR Take 4 L by mouth once for 1 dose 4000 mL 0    tolterodine (DETROL LA) 4 MG extended release capsule Take 1 capsule by mouth daily 30 capsule 2    norethindrone (ORTHO MICRONOR) 0.35 MG tablet Take 1 tablet by mouth daily 84 tablet 4    hydrocortisone 1 % lotion Apply topically 2 times daily as needed (Patient not taking: Reported on 10/5/2022)       No current facility-administered medications for this visit. Family History   Problem Relation Age of Onset    Other Neg Hx     Breast Cancer Neg Hx     Post-op Cognitive Dysfunction Neg Hx     Emergence Delirium Neg Hx     Post-op Nausea/Vomiting Neg Hx     Delayed Awakening Neg Hx     Pseudochol. Deficiency Neg Hx     Malig Hypertherm Neg Hx     Diabetes Maternal Grandmother     Hypertension Maternal Grandmother     Heart Disease Maternal Grandmother     Cancer Mother     Hypertension Mother        Return for colonoscopy. An electronic signature was used to authenticate this note.     --Letty Ocasio MD

## 2022-10-05 NOTE — PROGRESS NOTES
Dede Phillips (:  1970) is a 47 y.o. female, new patient here for evaluation of the following chief complaint(s):  Constipation (Very irregular )         ASSESSMENT/PLAN:  1. Change in bowel habit  -     PEG 3350-KCl-NaBcb-NaCl-NaSulf (PEG-3350/ELECTROLYTES) 236 g SOLR; Take 4 L by mouth once for 1 dose, Disp-4000 mL, R-0Normal  2. Incomplete defecation    We will proceed with colonoscopic evaluation if that is negative she might need a rectal manometry study. Subjective   SUBJECTIVE/OBJECTIVE  Presenting with bowel changes and defecation abnormality. She had had a discoid ectomy for near complete compression of her spinal cord she recovered and then she is having relapsing symptoms. She is having severe weakness and no sensation on the left side of her body. They have been followed by a neurologist at this moment work-up is still in progress. She has hard time having a bowel movement she can feel that she needs to have a bowel movement and she has a hard time pushing and emptying her bowels. She has some time to digitally remove her stools. She has a bowel movement every 7 to 10 days. She tried to take some laxative ended up with severe diarrhea and stool incontinence. She also has a bladder urine incontinence and she has to wear a diaper.   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    Conus medullaris syndrome ([address] 2021    Endometriosis determined by laparoscopy     Fibroids     Fibromyalgia     Fibromyalgia     High cholesterol     Pancreatitis     x5- acute attacks; no problems in past 2 years    Thoracic myelopathy 2021    Vitamin B 12 deficiency     B12 injections       Past Surgical History:   Procedure Laterality Date    COLONOSCOPY      GYN  1990s    laparoscopy    HEENT      right ear, stapes replaced    ORTHOPEDIC SURGERY      carpal tunnel release, right    ORTHOPEDIC SURGERY Right     foot surgery             No Known Allergies       Review of Systems   Constitutional:  Negative for appetite change. HENT:  Negative for mouth sores and trouble swallowing. Respiratory:  Negative for shortness of breath. Cardiovascular:  Negative for chest pain. Gastrointestinal:  Positive for constipation. Negative for abdominal pain, blood in stool and vomiting. Musculoskeletal:  Positive for gait problem and neck pain. Skin:  Negative for color change. Allergic/Immunologic: Negative for food allergies. Neurological:  Positive for weakness and numbness. Negative for seizures. Hematological:  Does not bruise/bleed easily. Objective   Physical Exam  HENT:      Head: Normocephalic. Mouth/Throat:      Mouth: Mucous membranes are moist.   Eyes:      General: No scleral icterus. Cardiovascular:      Rate and Rhythm: Normal rate and regular rhythm. Pulmonary:      Effort: No respiratory distress. Abdominal:      General: There is no distension. Tenderness: There is no abdominal tenderness. There is no rebound. Lymphadenopathy:      Cervical: No cervical adenopathy. Skin:     Coloration: Skin is not jaundiced. Findings: No bruising. Neurological:      Mental Status: She is alert and oriented to person, place, and time.             Current Outpatient Medications   Medication Sig Dispense Refill    PEG 3350-KCl-NaBcb-NaCl-NaSulf (PEG-3350/ELECTROLYTES) 236 g SOLR Take 4 L by mouth once for 1 dose 4000 mL 0    tolterodine (DETROL LA) 4 MG extended release capsule Take 1 capsule by mouth daily 30 capsule 2    norethindrone (ORTHO MICRONOR) 0.35 MG tablet Take 1 tablet by mouth daily 84 tablet 4    hydrocortisone 1 % lotion Apply topically 2 times daily as needed (Patient not taking: Reported on 10/5/2022)       No current facility-administered medications for this visit. Family History   Problem Relation Age of Onset    Other Neg Hx     Breast Cancer Neg Hx     Post-op Cognitive Dysfunction Neg Hx     Emergence Delirium Neg Hx     Post-op Nausea/Vomiting Neg Hx     Delayed Awakening Neg Hx     Pseudochol. Deficiency Neg Hx     Malig Hypertherm Neg Hx     Diabetes Maternal Grandmother     Hypertension Maternal Grandmother     Heart Disease Maternal Grandmother     Cancer Mother     Hypertension Mother        Return for colonoscopy. An electronic signature was used to authenticate this note.     --Tres Gentile MD

## 2022-10-13 ENCOUNTER — CLINICAL DOCUMENTATION (OUTPATIENT)
Dept: NEUROLOGY | Age: 52
End: 2022-10-13

## 2022-10-17 ENCOUNTER — PREP FOR PROCEDURE (OUTPATIENT)
Dept: GASTROENTEROLOGY | Age: 52
End: 2022-10-17

## 2022-10-17 PROBLEM — R19.4 CHANGE IN BOWEL HABITS: Status: ACTIVE | Noted: 2022-10-17

## 2022-10-17 PROBLEM — R15.0 INCOMPLETE DEFECATION: Status: ACTIVE | Noted: 2022-10-17

## 2022-10-17 RX ORDER — SODIUM CHLORIDE 9 MG/ML
25 INJECTION, SOLUTION INTRAVENOUS PRN
Status: CANCELLED | OUTPATIENT
Start: 2022-10-17

## 2022-10-17 RX ORDER — SODIUM CHLORIDE 0.9 % (FLUSH) 0.9 %
5-40 SYRINGE (ML) INJECTION PRN
Status: CANCELLED | OUTPATIENT
Start: 2022-10-17

## 2022-10-17 RX ORDER — SODIUM CHLORIDE 0.9 % (FLUSH) 0.9 %
5-40 SYRINGE (ML) INJECTION EVERY 12 HOURS SCHEDULED
Status: CANCELLED | OUTPATIENT
Start: 2022-10-17

## 2022-10-18 ENCOUNTER — OFFICE VISIT (OUTPATIENT)
Dept: ORTHOPEDIC SURGERY | Age: 52
End: 2022-10-18
Payer: COMMERCIAL

## 2022-10-18 DIAGNOSIS — S46.001A ROTATOR CUFF INJURY, RIGHT, INITIAL ENCOUNTER: Primary | ICD-10-CM

## 2022-10-18 DIAGNOSIS — M25.511 ACUTE PAIN OF RIGHT SHOULDER: ICD-10-CM

## 2022-10-18 PROCEDURE — 20611 DRAIN/INJ JOINT/BURSA W/US: CPT | Performed by: STUDENT IN AN ORGANIZED HEALTH CARE EDUCATION/TRAINING PROGRAM

## 2022-10-18 PROCEDURE — 99203 OFFICE O/P NEW LOW 30 MIN: CPT | Performed by: STUDENT IN AN ORGANIZED HEALTH CARE EDUCATION/TRAINING PROGRAM

## 2022-10-18 RX ORDER — METHYLPREDNISOLONE ACETATE 40 MG/ML
40 INJECTION, SUSPENSION INTRA-ARTICULAR; INTRALESIONAL; INTRAMUSCULAR; SOFT TISSUE ONCE
Status: COMPLETED | OUTPATIENT
Start: 2022-10-18 | End: 2022-10-18

## 2022-10-18 RX ADMIN — METHYLPREDNISOLONE ACETATE 40 MG: 40 INJECTION, SUSPENSION INTRA-ARTICULAR; INTRALESIONAL; INTRAMUSCULAR; SOFT TISSUE at 13:45

## 2022-10-18 NOTE — PROGRESS NOTES
Name: Rickie Figueroa  YOB: 1970  Gender: female  MRN: [id number]  Date of Encounter:  10/18/2022       CHIEF COMPLAINT:     Chief Complaint   Patient presents with    Shoulder Pain     Right        SUBJECTIVE/OBJECTIVE:      HPI:    Patient is a 47 y.o. pleasant female who presents today for a new evaluation of her right shoulder. She has a history of cervical spinal stenosis which caused subsequent bilateral LE weakness. She had previous surgery with Dr. Vijay Bell. She has been performing rehabilitation for BL LE weakness but she frequently falls and sustained a fall causing injury to her right shoulder. This fall occurred around 3 weeks ago. Since that time she has had lateral shoulder pain worse with abduction, overhead movements, driving, and sleep. Her only treatment tried has been biofreeze which gives her some relief. She has not tired any therapy exercises to the shoulder. She denies numbness or tingling about the arm. PAST HISTORY:   Past medical, surgical, family, social history and allergies reviewed by me. Pertinent history:   Tobacco use:  reports that she has never smoked. She has never used smokeless tobacco.  Diabetes: none  CKD: no  Anticoagulation: no      REVIEW OF SYSTEMS:   As noted in HPI. PHYSICAL EXAMINATION:     Gen: Well-developed, no acute distress   HEENT: NC/AT, EOMI   Neck: Trachea midline, normal ROM   CV: Regular rhythm by palpation of distal pulse, normal capillary refill   Pulm: No respiratory distress, no stridor   Psychiatric: Well oriented, normal mood and affect. Skin: No rashes, lesions or ulcers, normal temperature, turgor, and texture on uninvolved extremity. ORTHO EXAM:    Right Shoulder:     Inspection: Forward positioning of bilateral shoulders.  No atrophy, edema  ROM: Active forward flexion 180, abduction 160 with pain, Internal rotation T12, External rotation 45  Tenderness: Rotator cuff footprint, Deltoid  Strength: Abduction 5/5, External rotation 5/5, Internal rotation 5/5  Provocative tests: Positive Empty can, Batista, Negative speeds  Normal capillary refill / 2+ radial pulse   Sensation intact to light touch        DIAGNOSTIC IMAGING:     X-ray 3 vw RIGHT shoulder Grashey / axillary / scapular Y    Findings: No acute fracture or dislocation. No degenerative changes. No effusion. Impression: Normal 3 view of shoulder. I independently interpreted XR taken today    ASSESSMENT/PLAN:   1. Rotator cuff injury, right, initial encounter    2. Acute pain of right shoulder       Suspect she sustained a partial tear to RTC with her fall. She has had numerus testing and recent surgery. We discussed nonoperative vs. Operative treatment of RTC tears. I advised it would be reasonable to trial conservative management with injection and PT at this time. A PT ordered was placed today. Patient advised to work on RTC, scapular strengthening. Injection of corticosteroid was discussed and patient wishes to pursue injection. A SA bursa injection was performed today. We will follow up for reevaluation in 6 weeks . Subacromial Subdeltoid Bursa Injection, Ultrasound Guided     An injection of corticosteroid was discussed today and is indicated for patients pain and condition today. All risks and benefits were discussed and patient wishes to proceed. Prior to proceeding forward with a steroid injection to the right subacromial space, proper informed consent was provided by the patient and is documented in the chart. Time-out was conducted with all members of the care team. With the patient sitting upright, a lateral approach was utilized for this injection procedure. The site of the injection was cleansed chlorhexidine. site of the injection was then cleansed chlorhexidine. A sterile gel was then placed over the area and the ultrasound probe was used to positioned to reveal the subacromial bursa.  Following this a vapo coolant spray was utilized to provide local skin anesthesia. A solution of 40mg Depo-medrol and 4cc 1% lidocaine was delivered through a 22 gauge, 1.5\" needle into the subacromial space. The site was again cleansed with an alcohol swab. Ultrasound was used to ensure adequate deposition of the injectate solution into the correct location. The patient tolerated this procedure well with no adverse events. There was some notable pain relief reported by the patient prior to leaving the office today. The patient was counseled not to submerge the site for 24 hours, not to perform strenuous activity for the next five days, and if notes any signs or symptoms consistent with joint infection or allergic reaction to go to a local emergency room. The patient was observed for 15 minutes postprocedure and was allowed to be discharged from clinic in their usual state of health. Risks including infection, bleeding, nerve damage, and pain at the site of injection were discussed at length with the patient. Benefits including pain relief were also discussed with the patient. Patient verbalizes understanding of these and agrees to procedure. Orders / medications today:   Orders Placed This Encounter   Procedures    XR SHOULDER RIGHT (MIN 2 VIEWS)     Right shoulder Grashey Outlet & Axillary     Standing Status:   Future     Number of Occurrences:   1     Standing Expiration Date:   10/18/2023    US ARTHR/ASP/INJ MAJOR JNT/BURSA RIGHT     Standing Status:   Future     Number of Occurrences:   1     Standing Expiration Date:   10/18/2023    Ambulatory referral to Physical Therapy     Referral Priority:   Routine     Referral Type:   Eval and Treat     Referral Reason:   Patient Preference     Number of Visits Requested:   1      Follow up: Return in about 6 weeks (around 11/29/2022). The patient expressed understanding and agreed with the plan.      Tavia Braun MD   [facility]     This document was created using voice recognition software so frequent mistakes are possible. For any concerns about the wording of this document, please contact its creator for further clarification.

## 2022-10-18 NOTE — PERIOP NOTE
Patient verified name, , and procedure. Type: 1a; abbreviated assessment per anesthesia guidelines    Labs per anesthesia: None    Instructed pt that they will be notified the day before their procedure by the GI Lab for time of arrival if their procedure is [facility] and Pre-op for [place] cases. Arrival times should be called by 5 pm. If no phone is received the patient should contact their respective hospital. The GI lab telephone number is [phone] and ES Pre-op is [phone]. Follow diet and prep instructions per office including NPO status. If patient has NOT received instructions from office patient is advised to call surgeon office, verbalizes understanding. Bath or shower the night before and the am of surgery with non-mositurizing soap. No lotions, oils, powders, cologne on skin. No make up, eye make up or jewelry. Wear loose fitting comfortable, clean clothing. Must have adult present in building the entire time . Medications for the day of procedure Norethindrone, patient to hold None    The following discharge instructions reviewed with patient: medication given during procedure may cause drowsiness for several hours, therefore, do not drive or operate machinery for remainder of the day. You may not drink alcohol on the day of your procedure, please resume regular diet and activity unless otherwise directed. You may experience abdominal distention for several hours that is relieved by the passage of gas. Contact your physician if you have any of the following: fever or chills, severe abdominal pain or excessive amount of bleeding or a large amount when having a bowel movement.  Occasional specks of blood with bowel movement would not be unusual.

## 2022-10-19 RX ORDER — IPRATROPIUM BROMIDE AND ALBUTEROL SULFATE 2.5; .5 MG/3ML; MG/3ML
1 SOLUTION RESPIRATORY (INHALATION)
Status: CANCELLED | OUTPATIENT
Start: 2022-10-19 | End: 2022-10-20

## 2022-10-20 ENCOUNTER — ANESTHESIA (OUTPATIENT)
Dept: ENDOSCOPY | Age: 52
End: 2022-10-20
Payer: COMMERCIAL

## 2022-10-20 ENCOUNTER — HOSPITAL ENCOUNTER (OUTPATIENT)
Age: 52
Setting detail: OUTPATIENT SURGERY
Discharge: HOME | End: 2022-10-20
Attending: INTERNAL MEDICINE | Admitting: INTERNAL MEDICINE
Payer: COMMERCIAL

## 2022-10-20 ENCOUNTER — ANESTHESIA EVENT (OUTPATIENT)
Dept: ENDOSCOPY | Age: 52
End: 2022-10-20
Payer: COMMERCIAL

## 2022-10-20 VITALS
DIASTOLIC BLOOD PRESSURE: 81 MMHG | SYSTOLIC BLOOD PRESSURE: 138 MMHG | RESPIRATION RATE: 18 BRPM | OXYGEN SATURATION: 100 % | HEIGHT: 66 IN | BODY MASS INDEX: 21.24 KG/M2 | TEMPERATURE: 98.8 F | HEART RATE: 76 BPM | WEIGHT: 132.2 LBS

## 2022-10-20 DIAGNOSIS — R19.4 CHANGE IN BOWEL HABITS: ICD-10-CM

## 2022-10-20 DIAGNOSIS — R15.0 INCOMPLETE DEFECATION: ICD-10-CM

## 2022-10-20 LAB — HCG UR QL: NEGATIVE

## 2022-10-20 PROCEDURE — 7100000010 HC PHASE II RECOVERY - FIRST 15 MIN: Performed by: INTERNAL MEDICINE

## 2022-10-20 PROCEDURE — 6360000002 HC RX W HCPCS: Performed by: NURSE ANESTHETIST, CERTIFIED REGISTERED

## 2022-10-20 PROCEDURE — 7100000011 HC PHASE II RECOVERY - ADDTL 15 MIN: Performed by: INTERNAL MEDICINE

## 2022-10-20 PROCEDURE — 3700000000 HC ANESTHESIA ATTENDED CARE: Performed by: INTERNAL MEDICINE

## 2022-10-20 PROCEDURE — 2580000003 HC RX 258: Performed by: ANESTHESIOLOGY

## 2022-10-20 PROCEDURE — 88305 TISSUE EXAM BY PATHOLOGIST: CPT

## 2022-10-20 PROCEDURE — 3700000001 HC ADD 15 MINUTES (ANESTHESIA): Performed by: INTERNAL MEDICINE

## 2022-10-20 PROCEDURE — 2709999900 HC NON-CHARGEABLE SUPPLY: Performed by: INTERNAL MEDICINE

## 2022-10-20 PROCEDURE — 3609010600 HC COLONOSCOPY POLYPECTOMY SNARE/COLD BIOPSY: Performed by: INTERNAL MEDICINE

## 2022-10-20 PROCEDURE — 81025 URINE PREGNANCY TEST: CPT

## 2022-10-20 PROCEDURE — 2500000003 HC RX 250 WO HCPCS: Performed by: NURSE ANESTHETIST, CERTIFIED REGISTERED

## 2022-10-20 RX ORDER — SODIUM CHLORIDE 0.9 % (FLUSH) 0.9 %
5-40 SYRINGE (ML) INJECTION PRN
Status: DISCONTINUED | OUTPATIENT
Start: 2022-10-20 | End: 2022-10-20 | Stop reason: HOSPADM

## 2022-10-20 RX ORDER — LIDOCAINE HYDROCHLORIDE 20 MG/ML
INJECTION, SOLUTION EPIDURAL; INFILTRATION; INTRACAUDAL; PERINEURAL PRN
Status: DISCONTINUED | OUTPATIENT
Start: 2022-10-20 | End: 2022-10-20 | Stop reason: SDUPTHER

## 2022-10-20 RX ORDER — SODIUM CHLORIDE 0.9 % (FLUSH) 0.9 %
5-40 SYRINGE (ML) INJECTION EVERY 12 HOURS SCHEDULED
Status: DISCONTINUED | OUTPATIENT
Start: 2022-10-20 | End: 2022-10-20 | Stop reason: HOSPADM

## 2022-10-20 RX ORDER — SODIUM CHLORIDE 9 MG/ML
INJECTION, SOLUTION INTRAVENOUS PRN
Status: DISCONTINUED | OUTPATIENT
Start: 2022-10-20 | End: 2022-10-20 | Stop reason: HOSPADM

## 2022-10-20 RX ORDER — PROPOFOL 10 MG/ML
INJECTION, EMULSION INTRAVENOUS PRN
Status: DISCONTINUED | OUTPATIENT
Start: 2022-10-20 | End: 2022-10-20 | Stop reason: SDUPTHER

## 2022-10-20 RX ORDER — SODIUM CHLORIDE, SODIUM LACTATE, POTASSIUM CHLORIDE, CALCIUM CHLORIDE 600; 310; 30; 20 MG/100ML; MG/100ML; MG/100ML; MG/100ML
INJECTION, SOLUTION INTRAVENOUS CONTINUOUS
Status: DISCONTINUED | OUTPATIENT
Start: 2022-10-20 | End: 2022-10-20 | Stop reason: HOSPADM

## 2022-10-20 RX ORDER — LIDOCAINE HYDROCHLORIDE 10 MG/ML
1 INJECTION, SOLUTION INFILTRATION; PERINEURAL
Status: DISCONTINUED | OUTPATIENT
Start: 2022-10-20 | End: 2022-10-20 | Stop reason: HOSPADM

## 2022-10-20 RX ORDER — SODIUM CHLORIDE 9 MG/ML
25 INJECTION, SOLUTION INTRAVENOUS PRN
Status: DISCONTINUED | OUTPATIENT
Start: 2022-10-20 | End: 2022-10-20 | Stop reason: HOSPADM

## 2022-10-20 RX ADMIN — PROPOFOL 50 MG: 10 INJECTION, EMULSION INTRAVENOUS at 12:34

## 2022-10-20 RX ADMIN — Medication 60 MG: at 12:34

## 2022-10-20 RX ADMIN — PROPOFOL 160 MCG/KG/MIN: 10 INJECTION, EMULSION INTRAVENOUS at 12:35

## 2022-10-20 RX ADMIN — SODIUM CHLORIDE, POTASSIUM CHLORIDE, SODIUM LACTATE AND CALCIUM CHLORIDE 125 ML/HR: 600; 310; 30; 20 INJECTION, SOLUTION INTRAVENOUS at 11:53

## 2022-10-20 ASSESSMENT — PAIN - FUNCTIONAL ASSESSMENT: PAIN_FUNCTIONAL_ASSESSMENT: 0-10

## 2022-10-20 ASSESSMENT — PAIN SCALES - GENERAL
PAINLEVEL_OUTOF10: 0
PAINLEVEL_OUTOF10: 0

## 2022-10-20 NOTE — DISCHARGE INSTRUCTIONS
Gastrointestinal Colonoscopy/Flexible Sigmoidoscopy - Lower Exam Discharge Instructions    Call Dr. Kristie Bonilla at [phone] for any problems or questions. Contact the doctors office for follow up appointment as directed  Medication may cause drowsiness for several hours, therefore:  Do not drive or operate machinery for reminder of the day. No alcohol today. Do not make any important or legal decisions for 24 hours. Do not sign any legal documents for 24 hours. Ordinarily, you may resume regular diet and activity after exam unless otherwise specified by your physician. Because of air put into your colon during exam, you may experience some abdominal distension, relieved by the passage of gas, for several hours. Contact your physician if you have any of the following:  Excessive amount of bleeding - large amount when having a bowel movement.   Occasional specks of blood with bowel movement would not be unusual.  Severe abdominal pain  Fever or Chills

## 2022-10-20 NOTE — ANESTHESIA POSTPROCEDURE EVALUATION
Department of Anesthesiology  Postprocedure Note    Patient: Abby Brody  MRN: [id number]  YOB: 1970  Date of evaluation: 10/20/2022      Procedure Summary     Date: 10/20/22 Room / [facility]    Anesthesia Start: 1230 Anesthesia Stop: 9599    Procedure: COLONOSCOPY POLYPECTOMY SNARE/COLD BIOPSY (Lower GI Region) Diagnosis:       Incomplete defecation      Change in bowel habits      (Incomplete defecation [R15.0])      (Change in bowel habits [R19.4])    Providers: Jane Crowley MD Responsible Provider: Pedro Babin MD    Anesthesia Type: TIVA ASA Status: 2          Anesthesia Type: TIVA    Salud Phase I:      Salud Phase II: Salud Score: 10      Anesthesia Post Evaluation    Patient location during evaluation: bedside  Patient participation: complete - patient participated  Level of consciousness: awake and alert  Airway patency: patent  Nausea & Vomiting: nausea  Complications: no  Cardiovascular status: hemodynamically stable  Respiratory status: acceptable, nonlabored ventilation and spontaneous ventilation  Hydration status: euvolemic

## 2022-10-20 NOTE — INTERVAL H&P NOTE
Update History & Physical    The patient's History and Physical of October 5,  was reviewed with the patient and I examined the patient. There was no change. The surgical site was confirmed by the patient and me. Plan: The risks, benefits, expected outcome, and alternative to the recommended procedure have been discussed with the patient. Patient understands and wants to proceed with the procedure.      Electronically signed by Keon Parks MD on 10/20/2022 at 11:52 AM

## 2022-10-20 NOTE — PROCEDURES
Operative Report    Patient: Gal Castelan MRN: [id number]      YOB: 1970  Age: 47 y.o. Sex: female            Indications:  constipation [unfilled]     Preoperative Evaluation: The patient was evaluated prior to the procedure in the GI lab admission area, the patient ASA was recorded . Consent was obtained from the patient with the risk of perforation bleeding and aspiration. Anesthesia: ZOEY-per anesthesia    Complications: None; patient tolerated the procedure well. EBL -insignificant      Procedure: The patient was sedated in the left lateral decubitus position. Scope was advanced from the rectum to the cecum. Evaluation was performed to the cecum twice. The scope was withdrawn to the rectum, retroflexed view was performed. The rectal exam was normal.  Preparation was fair Hull score of 2/2/2;6 . Findings:   Exam to the cecum. 2 passes were performed in the cecum. No sign of polyp in the cecum and ascending colon. A flat serrated 8 x 10 mm with irregular edges polyp noted into the mid transverse colon. Polyp was hot snared. Normal descending sigmoid and rectal mucosa. Retroflexion visual circumferential internal hemorrhoid. Postoperative Diagnosis: Transverse colon polyp    24102 Colonoscopy, Flexible; with removal of tumor(s), polyp(s), or other lesion(s) by snare technique      Recommendations:   - Repeat colonoscopy in 5 years.       Signed By:  Дмитрий Garcia MD     October 20, 2022

## 2022-10-20 NOTE — ANESTHESIA PRE PROCEDURE
Department of Anesthesiology  Preprocedure Note       Name:  Shannan Beauchamp   Age:  47 y.o.  :  1970                                          MRN:  [id number]         Date:  10/20/2022      Surgeon: Amber Murray):  Norman Espinal MD    Procedure: Procedure(s):  COLORECTAL CANCER SCREENING, NOT HIGH RISK    Medications prior to admission:   Prior to Admission medications    Medication Sig Start Date End Date Taking?  Authorizing Provider   tolterodine (DETROL LA) 4 MG extended release capsule Take 1 capsule by mouth daily  Patient taking differently: Take 4 mg by mouth at bedtime 22   Shruthi Martinez MD   norethindrone (ORTHO MICRONOR) 0.35 MG tablet Take 1 tablet by mouth daily 22   Harvey Bernabe MD       Current medications:    Current Facility-Administered Medications   Medication Dose Route Frequency Provider Last Rate Last Admin    lidocaine 1 % injection 1 mL  1 mL IntraDERmal Once PRN Karle Goodell, MD        lactated ringers infusion   IntraVENous Continuous Karle Goodell, MD        sodium chloride flush 0.9 % injection 5-40 mL  5-40 mL IntraVENous 2 times per day Karle Goodell, MD        sodium chloride flush 0.9 % injection 5-40 mL  5-40 mL IntraVENous PRN Karle Goodell, MD        0.9 % sodium chloride infusion   IntraVENous PRN Karle Goodell, MD        sodium chloride flush 0.9 % injection 5-40 mL  5-40 mL IntraVENous 2 times per day Norman Espinal MD        sodium chloride flush 0.9 % injection 5-40 mL  5-40 mL IntraVENous PRN Norman Espinal MD        0.9 % sodium chloride infusion  25 mL IntraVENous PRN Norman Espinal MD           Allergies:  No Known Allergies    Problem List:    Patient Active Problem List   Diagnosis Code    Endometriosis determined by laparoscopy N80.9    Cervical spinal stenosis M48.02    Fibroids D21.9    Menometrorrhagia N92.1    Thoracic myelopathy M47.14    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 S24.143A    Endometriosis N80.9    Breast cyst N60.09    Spasticity R25.2    Conus medullaris syndrome (HCC) 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 ([facility] 8/16/2021    Conus medullaris syndrome ([facility] 07/13/2021    Endometriosis determined by laparoscopy     Fibroids     Fibromyalgia     High cholesterol     Pancreatitis     x5- acute attacks; no problems in past 2 years    Thoracic myelopathy 7/13/2021    Vitamin B 12 deficiency     B12 injections       Past Surgical History:        Procedure Laterality Date    COLONOSCOPY      GYN  1990s    laparoscopy    HEENT      right ear, stapes replaced    ORTHOPEDIC SURGERY      carpal tunnel release, right    ORTHOPEDIC SURGERY Right     foot surgery       Social History:    Social History     Tobacco Use    Smoking status: Never    Smokeless tobacco: Never   Substance Use Topics    Alcohol use:  No                                Counseling given: Not Answered      Vital Signs (Current):   Vitals:    10/18/22 1354 10/20/22 1115 10/20/22 1138   BP:  (!) 154/90    Pulse:  81    Resp:  18    Temp:  98.8 °F (37.1 °C)    TempSrc:  Temporal    SpO2:  98%    Weight: 135 lb (61.2 kg) 132 lb 3.2 oz (60 kg)    Height: 5' 6\" (1.676 m)  5' 6\" (1.676 m)                                              BP Readings from Last 3 Encounters:   10/20/22 (!) 154/90   10/05/22 (!) 143/76   09/16/22 123/77       NPO Status: BMI:   Wt Readings from Last 3 Encounters:   10/20/22 132 lb 3.2 oz (60 kg)   10/05/22 136 lb (61.7 kg)   09/26/22 135 lb (61.2 kg)     Body mass index is 21.34 kg/m². CBC:   Lab Results   Component Value Date/Time    WBC 5.2 01/19/2022 01:06 PM    RBC 4.36 01/19/2022 01:06 PM    HGB 13.4 01/19/2022 01:06 PM    HCT 39.3 01/19/2022 01:06 PM    MCV 90.1 01/19/2022 01:06 PM    RDW 12.5 01/19/2022 01:06 PM     01/19/2022 01:06 PM       CMP:   Lab Results   Component Value Date/Time     01/19/2022 01:06 PM    K 4.1 01/19/2022 01:06 PM     01/19/2022 01:06 PM    CO2 26 01/19/2022 01:06 PM    BUN 8 01/19/2022 01:06 PM    CREATININE 0.74 01/19/2022 01:06 PM    GFRAA >60 01/19/2022 01:06 PM    AGRATIO 2.0 06/15/2021 04:03 PM    GLUCOSE 94 01/19/2022 01:06 PM    PROT 6.3 06/15/2021 04:03 PM    CALCIUM 9.3 01/19/2022 01:06 PM    BILITOT 0.3 06/15/2021 04:03 PM    ALKPHOS 50 06/15/2021 04:03 PM    AST 16 06/15/2021 04:03 PM    ALT 12 06/15/2021 04:03 PM       POC Tests: No results for input(s): POCGLU, POCNA, POCK, POCCL, POCBUN, POCHEMO, POCHCT in the last 72 hours. Coags: No results found for: PROTIME, INR, APTT    HCG (If Applicable): No results found for: PREGTESTUR, PREGSERUM, HCG, HCGQUANT     ABGs: No results found for: PHART, PO2ART, QDT3ASD, NUP1RJB, BEART, R1URQNXS     Type & Screen (If Applicable):  No results found for: LABABO, LABRH    Drug/Infectious Status (If Applicable):  No results found for: HIV, HEPCAB    COVID-19 Screening (If Applicable):   Lab Results   Component Value Date/Time    COVID19 Performed 01/21/2022 08:44 AM    COVID19 Not Detected 01/21/2022 08:44 AM           Anesthesia Evaluation  Patient summary reviewed and Nursing notes reviewed history of anesthetic complications (was told she had low BP during a colonoscopy):    Airway: Mallampati: I       Mouth opening: > = 3 FB   Dental: normal exam         Pulmonary: breath sounds clear to auscultation                             Cardiovascular:Negative CV ROS  Exercise tolerance: good (>4 METS),           Rhythm: regular  Rate: normal                    Neuro/Psych:   (+) neuromuscular disease:,              ROS comment: Brown Sequard syndrome at T10 level; right side weakness and left side numbness and decrease sensation to temperature GI/Hepatic/Renal: Neg GI/Hepatic/Renal ROS            Endo/Other: Negative Endo/Other ROS                    Abdominal:             Vascular: negative vascular ROS. Other Findings:           Anesthesia Plan      TIVA     ASA 2     (Discussed TIVA with its benefits (lower risk of nausea and sore throat, etc.) and risks including possible awareness, patient understands and elects to proceed)  Induction: intravenous. Anesthetic plan and risks discussed with patient and spouse.                         Estelle Zimmerman MD   10/20/2022

## 2022-10-20 NOTE — PERIOP NOTE
PT'S SO JOVANY HAS ALL OF PT'S BELONGINGS AND MAY BE REACHED -[phone]. JOVANY WILL BE WAITING IN THE CAR.

## 2022-11-07 ENCOUNTER — OFFICE VISIT (OUTPATIENT)
Dept: NEUROLOGY | Age: 52
End: 2022-11-07
Payer: COMMERCIAL

## 2022-11-07 VITALS
DIASTOLIC BLOOD PRESSURE: 82 MMHG | HEART RATE: 66 BPM | SYSTOLIC BLOOD PRESSURE: 133 MMHG | WEIGHT: 135 LBS | BODY MASS INDEX: 21.79 KG/M2

## 2022-11-07 DIAGNOSIS — R25.2 SPASTICITY: ICD-10-CM

## 2022-11-07 DIAGNOSIS — R20.0 NUMBNESS IN LEFT LEG: ICD-10-CM

## 2022-11-07 DIAGNOSIS — R29.898 RIGHT LEG WEAKNESS: Primary | ICD-10-CM

## 2022-11-07 PROCEDURE — 95911 NRV CNDJ TEST 9-10 STUDIES: CPT | Performed by: PSYCHIATRY & NEUROLOGY

## 2022-11-07 PROCEDURE — 95885 MUSC TST DONE W/NERV TST LIM: CPT | Performed by: PSYCHIATRY & NEUROLOGY

## 2022-11-07 PROCEDURE — 95886 MUSC TEST DONE W/N TEST COMP: CPT | Performed by: PSYCHIATRY & NEUROLOGY

## 2022-11-07 NOTE — PROGRESS NOTES
[address]       Nerve Conduction Study and Electromyogram Report           Hx: 47 y.o. female with complaint of weakness right leg and numbness left leg about a year. Post CS decompression for myelopathy on 1/25/2022. Symptoms got better but became weaker since April 2022. Receiving PT 2 times per week. Clinical summary was reviewed. Neurological examination: Motor power showed right lower limb spastic weakness, left leg good strength but reduced sensory perception up to abdomen level. There was no atrophy. There were no fasciculations. Deep tendon reflexes were present and symmetrical at 2 and brisk on right lower with ankle clonus. Gait spastic right leg. Description: Nerve conduction studies were performed on the right lower extremity and left lower extremity, using standard technique. Bilateral sural and plantar nerves responses were normal.  Bilateral peroneal and tibial motor nerves were normal.  F-wave latencies were normal 45.0-46.8 MS. H reflex response was normal bilaterally. Needle electromyography was performed on the right lower extremity, left lower extremity, and bilateral bilateral lumbar paraspinal muscles, using standard concentric electrodes. Results showed spasticity in right lower limb with reduced recruitment from upper motor neuron process. No denervation or renervation changes. Conclusion: Normal nerve conduction study and needle EMG of bilateral lower limb showing no evidence of mono or polyneuropathy, lumbar radiculopathy or plexopathy. No myopathic process.           Procedure Details: Under procedure category          Sofia Brizuela MD

## 2022-11-08 ENCOUNTER — OFFICE VISIT (OUTPATIENT)
Dept: UROLOGY | Age: 52
End: 2022-11-08
Payer: COMMERCIAL

## 2022-11-08 DIAGNOSIS — K59.00 CONSTIPATION, UNSPECIFIED CONSTIPATION TYPE: ICD-10-CM

## 2022-11-08 DIAGNOSIS — N32.81 OAB (OVERACTIVE BLADDER): Primary | ICD-10-CM

## 2022-11-08 DIAGNOSIS — N39.41 URINARY INCONTINENCE, URGE: ICD-10-CM

## 2022-11-08 LAB
BILIRUBIN, URINE, POC: NEGATIVE
BLOOD URINE, POC: ABNORMAL
GLUCOSE URINE, POC: NEGATIVE
KETONES, URINE, POC: NEGATIVE
LEUKOCYTE ESTERASE, URINE, POC: ABNORMAL
NITRITE, URINE, POC: NEGATIVE
PH, URINE, POC: 5.5 (ref 4.6–8)
PROTEIN,URINE, POC: NEGATIVE
PVR, POC: ABNORMAL CC
SPECIFIC GRAVITY, URINE, POC: 1.03 (ref 1–1.03)
URINALYSIS CLARITY, POC: ABNORMAL
URINALYSIS COLOR, POC: ABNORMAL
UROBILINOGEN, POC: ABNORMAL

## 2022-11-08 PROCEDURE — 81003 URINALYSIS AUTO W/O SCOPE: CPT | Performed by: NURSE PRACTITIONER

## 2022-11-08 PROCEDURE — 99204 OFFICE O/P NEW MOD 45 MIN: CPT | Performed by: NURSE PRACTITIONER

## 2022-11-08 PROCEDURE — 51798 US URINE CAPACITY MEASURE: CPT | Performed by: NURSE PRACTITIONER

## 2022-11-08 NOTE — PROGRESS NOTES
[facility]  [address] [facility] [address] [facility]  [phone]          Rayna Turk  : 1970    Chief Complaint   Patient presents with    New Patient          HPI     Rayna Turk is a 47 y.o. female referred for ongoing bladder control issues. She had anterior cervic discectomy and fusion for severe cord compression and myelopathy early this year. She initially did well. She then started to have worsening muscle weakness especially in the lower legs. Has had cervical, thoracic, and lumbar MRI. Working w PT to help. Has recently seen neurology for nerve conduction testing. The hope is her weakness will improve with time. No add surgical intervention needed. Since the weakness, she has had bowel and bladder issues. Seen by GI. Had normal colonoscopy. She reports with her bladder, she is having urinary frequency as much as multiple times per hour. Sign UUI. Occ enuresis. No prior h/o bladder troubles. . On BCP. Followed w Dr Chris Lerma. H/O fibroids. Has regular normal pelvic exams and PAPS.        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 ([facility] 2021    Conus medullaris syndrome ([facility] 2021    Endometriosis determined by laparoscopy     Fibroids     Fibromyalgia     High cholesterol     Pancreatitis     x5- acute attacks; no problems in past 2 years    Thoracic myelopathy 2021    Vitamin B 12 deficiency     B12 injections     Past Surgical History:   Procedure Laterality Date    COLONOSCOPY      COLONOSCOPY N/A 10/20/2022    COLONOSCOPY POLYPECTOMY SNARE/COLD BIOPSY performed by Tres Gentile MD at [address]    laparoscopy    HEENT      right ear, stapes replaced    ORTHOPEDIC SURGERY      carpal tunnel release, right    ORTHOPEDIC SURGERY Right     foot surgery     Current Outpatient Medications   Medication Sig Dispense Refill    mirabegron (MYRBETRIQ) 25 MG TB24 Take 1 tablet by mouth daily 30 tablet 0    norethindrone (ORTHO MICRONOR) 0.35 MG tablet Take 1 tablet by mouth daily 84 tablet 4     No current facility-administered medications for this visit. No Known Allergies  Social History     Socioeconomic History    Marital status: Single     Spouse name: Not on file    Number of children: Not on file    Years of education: Not on file    Highest education level: Not on file   Occupational History    Not on file   Tobacco Use    Smoking status: Never    Smokeless tobacco: Never   Substance and Sexual Activity    Alcohol use: No    Drug use: No    Sexual activity: Yes     Partners: Male     Birth control/protection: Pill   Other Topics Concern    Not on file   Social History Narrative    Not on file     Social Determinants of Health     Financial Resource Strain: Not on file   Food Insecurity: Not on file   Transportation Needs: Not on file   Physical Activity: Not on file   Stress: Not on file   Social Connections: Not on file   Intimate Partner Violence: Not on file   Housing Stability: Not on file     Family History   Problem Relation Age of Onset    Other Neg Hx     Breast Cancer Neg Hx     Post-op Cognitive Dysfunction Neg Hx     Emergence Delirium Neg Hx     Post-op Nausea/Vomiting Neg Hx     Delayed Awakening Neg Hx     Pseudochol. Deficiency Neg Hx     Malig Hypertherm Neg Hx     Diabetes Maternal Grandmother     Hypertension Maternal Grandmother     Heart Disease Maternal Grandmother     Cancer Mother     Hypertension Mother        Review of Systems  Genitourinary: Positive for hematuria, urgency and leakage w/ urge.     Urinalysis  UA - Dipstick  Results for orders placed or performed in visit on 11/08/22   AMB POC URINALYSIS DIP STICK AUTO W/O MICRO   Result Value Ref Range    Color (UA POC)      Clarity (UA POC)      Glucose, Urine, POC Negative Negative    Bilirubin, Urine, POC Negative Negative    KETONES, Urine, POC Negative Negative    Specific Gravity, Urine, POC 1.030 1.001 - 1.035    Blood (UA POC) Moderate Negative    pH, Urine, POC 5.5 4.6 - 8.0    Protein, Urine, POC Negative Negative    Urobilinogen, POC 2 mg/dL     Nitrite, Urine, POC Negative Negative    Leukocyte Esterase, Urine, POC Small Negative       UA - Micro  WBC - 0  RBC - 0  Bacteria - 0  Epith - 0    PHYSICAL EXAM    General appearance - well appearing and in no distress  Mental status - alert, oriented to person, place, and time  Neck - supple, no significant adenopathy  Chest/Lung-  Quiet, even and easy respiratory effort without use of accessory muscles  Skin - normal coloration and turgor, no rashes      Assessment and Plan    ICD-10-CM    1. OAB (overactive bladder)  N32.81 AMB POC URINALYSIS DIP STICK AUTO W/O MICRO     AMB POC PVR, GREG,POST-VOID RES,US,NON-IMAGING     [facility] - [facility]      2. Urinary incontinence, urge  N39.41 [facility]      3. Constipation, unspecified constipation type  K59.00 [facility]        OAB/UUI- urine normal. PVR low. NO concern for retention. Sx could be r/t previous spine issues. Discussed tx options. STOP detrol. I do NOT recommend anticholinergic d/t constipation. START Myrbetriq 25 mg PO daily. Risks, benefits, and alternatives reviewed. Also refer to PT PFT for evaluation and tx. Constipation- refer to PT PFT for evaluation and tx. RTO in 4-6 weeks for follow up with me. Advised to call sooner if sx worsen. Monico Roberts, BELP, APRN - CNP  Dr. Rosaline Gan is supervising physician today and he approves plan of care.

## 2022-11-16 ENCOUNTER — PATIENT MESSAGE (OUTPATIENT)
Dept: NEUROLOGY | Age: 52
End: 2022-11-16

## 2022-11-16 DIAGNOSIS — R20.0 NUMBNESS IN LEFT LEG: Primary | ICD-10-CM

## 2022-11-16 RX ORDER — GABAPENTIN 300 MG/1
CAPSULE ORAL
Qty: 90 CAPSULE | Refills: 5 | Status: SHIPPED | OUTPATIENT
Start: 2022-11-16 | End: 2023-05-16

## 2022-11-16 NOTE — TELEPHONE ENCOUNTER
From: Marlon Saini  To: Dr. Tomlin Gene: 11/16/2022 12:30 AM EST  Subject: Sensitive leg    Good morning, it is 12:27a.m. and my left leg is extremely sensitive that I can't sleep and I can't stand for anything to touch it. It seems like it's worse at night time and it's making it hard for me to sleep. What do you think is going on? What can I do to help?

## 2022-11-16 NOTE — TELEPHONE ENCOUNTER
Requested Prescriptions     Signed Prescriptions Disp Refills    gabapentin (NEURONTIN) 300 MG capsule 90 capsule 5     Si qhs x 1 week, if tolerate well, increase to 1 bid x 1 week, then 1 tid.      Authorizing Provider: Michelle Love

## 2022-11-28 ENCOUNTER — OFFICE VISIT (OUTPATIENT)
Dept: UROLOGY | Age: 52
End: 2022-11-28
Payer: COMMERCIAL

## 2022-11-28 DIAGNOSIS — R30.0 DYSURIA: Primary | ICD-10-CM

## 2022-11-28 DIAGNOSIS — N30.00 ACUTE CYSTITIS WITHOUT HEMATURIA: ICD-10-CM

## 2022-11-28 LAB
BILIRUBIN, URINE, POC: NEGATIVE
BLOOD URINE, POC: NORMAL
GLUCOSE URINE, POC: NEGATIVE
KETONES, URINE, POC: NEGATIVE
LEUKOCYTE ESTERASE, URINE, POC: NEGATIVE
NITRITE, URINE, POC: POSITIVE
PH, URINE, POC: 5.5 (ref 4.6–8)
PROTEIN,URINE, POC: NEGATIVE
SPECIFIC GRAVITY, URINE, POC: 1.01 (ref 1–1.03)
URINALYSIS CLARITY, POC: NORMAL
URINALYSIS COLOR, POC: NORMAL
UROBILINOGEN, POC: NORMAL

## 2022-11-28 PROCEDURE — 81003 URINALYSIS AUTO W/O SCOPE: CPT | Performed by: NURSE PRACTITIONER

## 2022-11-28 RX ORDER — NITROFURANTOIN 25; 75 MG/1; MG/1
100 CAPSULE ORAL 2 TIMES DAILY
Qty: 14 CAPSULE | Refills: 0 | Status: SHIPPED | OUTPATIENT
Start: 2022-11-28 | End: 2022-12-05

## 2022-11-28 RX ORDER — ACETAMINOPHEN AND CODEINE PHOSPHATE 120; 12 MG/5ML; MG/5ML
1 SOLUTION ORAL DAILY
Qty: 84 TABLET | Refills: 4 | OUTPATIENT
Start: 2022-11-28

## 2022-11-28 NOTE — PROGRESS NOTES
Reason for collection: Frequency/dysuria  Patient #: [phone]  Pharmacy: [facility] [place])    UA - Dipstick  Results for orders placed or performed in visit on 11/28/22   AMB POC URINALYSIS DIP STICK AUTO W/O MICRO   Result Value Ref Range    Color (UA POC)      Clarity (UA POC)      Glucose, Urine, POC Negative Negative    Bilirubin, Urine, POC Negative Negative    KETONES, Urine, POC Negative Negative    Specific Gravity, Urine, POC 1.015 1.001 - 1.035    Blood (UA POC) Moderate Negative    pH, Urine, POC 5.5 4.6 - 8.0    Protein, Urine, POC Negative Negative    Urobilinogen, POC 1 mg/dL     Nitrite, Urine, POC Positive Negative    Leukocyte Esterase, Urine, POC Negative Negative       Requested Prescriptions      No prescriptions requested or ordered in this encounter     Plan    Diagnosis Orders   1. Dysuria  AMB POC URINALYSIS DIP STICK AUTO W/O MICRO         Urine infected. Culture pending. Macrobid sent to pharmacy. Milla Pandey, FNP, APRN - CNP

## 2022-11-29 ENCOUNTER — OFFICE VISIT (OUTPATIENT)
Dept: ORTHOPEDIC SURGERY | Age: 52
End: 2022-11-29

## 2022-11-29 DIAGNOSIS — M77.11 LATERAL EPICONDYLITIS OF RIGHT ELBOW: ICD-10-CM

## 2022-11-29 DIAGNOSIS — S46.001A ROTATOR CUFF INJURY, RIGHT, INITIAL ENCOUNTER: Primary | ICD-10-CM

## 2022-11-29 NOTE — PROGRESS NOTES
Name: Adamaris Bro  YOB: 1970  Gender: female  MRN: [id number]  Date of Encounter:  11/29/2022       CHIEF COMPLAINT:     Chief Complaint   Patient presents with    Follow-up     Right shoulder        SUBJECTIVE/OBJECTIVE:      HPI:    Patient is a 47 y.o. pleasant female who presents today for a follow up evaluation of her right shoulder    Working diagnosis is: Rotator cuff tear, right shoulder  LOV: 10/18/2022     At last visit, she had an SA-SD injection. This greatly improved her lateral shoulder pain. She has minimal pain to the shoulder at this time. She has been able to progress with her rehab had activities to a certain extent, but she continues to have pain at the lateral right elbow. This has not improved. She locates the pain over her lateral epicondyle. She has pain with movement of her elbow and wrist. She denies numbness to the hand or forearm. She has been doing PT for chronic leg weakness and dealing with other health issues. PAST HISTORY:   Past medical, surgical, family, social history and allergies reviewed by me. Unchanged from prior visit. REVIEW OF SYSTEMS:   As noted in HPI. PHYSICAL EXAMINATION:     Gen: Well-developed, no acute distress   HEENT: NC/AT, EOMI   Neck: Trachea midline, normal ROM   CV: Regular rhythm by palpation of distal pulse, normal capillary refill   Pulm: No respiratory distress, no stridor   Psychiatric: Well oriented, normal mood and affect. Skin: No rashes, lesions or ulcers, normal temperature, turgor, and texture on uninvolved extremity. ORTHO EXAM:     Right shoulder exam:  No erythema or edema  180 forward flexion, 170 abduction, 40 external rotation, internal rotation to L2  5 out of 5 abduction, external rotation, internal rotation   negative Batista, negative can    Right elbow exam:  No erythema, edema, deformity  Elbow flexion 150, extension 0.   Full supination and pronation  Tenderness to palpation over the lateral epicondyle and common extensor. Resisted wrist extension and long finger extension elicits pain. 2+ radial pulse  Sensation intact to light touch informed    DIAGNOSTIC IMAGING:     I have reviewed prior imaging studies. ASSESSMENT/PLAN:   1. Rotator cuff injury, right, initial encounter    2. Lateral epicondylitis of right elbow         Her shoulder has significantly improved and she is minimal pain on exam.  She was advised to continue her shoulder rehab activity. She was advised that the corticosteroid injection can wear off over time, and that is why she should continue to work on her strengthening exercises. I have advised that she wear a splint to the wrist with activity and consider obtaining an elbow strap. She was advised to apply topical Voltaren 4 times daily to the area. I have also given her a list of lateral epicondyle exercises and stretches to perform. I discussed with her that while corticosteroid injection can help in the interim regarding pain, typically conservative management is equivocal in terms of outcomes, and I would advise we trial therapy exercises and wrist splint before injection. She is agreeable with this. We can consider corticosteroid injection at her next visit. Follow-up in 6 weeks. Orders:   No orders of the defined types were placed in this encounter. Follow Up:   Return in about 6 weeks (around 1/10/2023). The patient expressed understanding and agreed with the plan. Sarah Huertas MD   [facility]     This document was created using voice recognition software so frequent mistakes are possible. For any concerns about the wording of this document, please contact its creator for further clarification.

## 2022-12-02 LAB
BACTERIA SPEC CULT: NORMAL
SERVICE CMNT-IMP: NORMAL

## 2022-12-05 NOTE — PROGRESS NOTES
Progressed resistance, range, repetitions, and complexity of movement as indicated. Date:  12-6-22 Date:   Date:     Activity/Exercise Parameters Parameters Parameters   Patient Education Discussed HEP and POC     Francis perkins Reviewed and given handout     Kegel with drop Reviewed and given handout        All exercises performed with emphasis on kegel and breathing in order improve coordination, awareness and to minimize symptoms. MANUAL THERAPY: ( minutes): to improve soft tissue tone    Date Type Location Comments   12/6/2022 Internal assessment/treatment                                         (Used abbreviations: MET - muscle energy technique; SCS- Strain counter strain; CTM-Connective tissue mobilizations; CR- Contract/Relax; SP- Sustained pressure; PIT- Positional inhibition techniques; STM Soft -tissue mobilization; MM- Myofascial mobilization; TrP-Trigger point release; IASTM- Instrument assisted soft tissue mobilizations, TDN-Trigger point dry needling)    Pt gives verbal consent to internal vaginal assessment/treatment without chaperon present. NEURO REEDUCATION: () to improve control and coordination of pelvic floor     Date:   Date:   Date:     Activity/Exercise Parameters Parameters Parameters   Biofeedback With sEMG was utilized for coordination of PFM.                                                 THERAPEUTIC ACTIVITY: ( minutes):     TA Educational Topic Notes Date Completed   Pathology/Anatomy/PFM Function     Bladder health education     Urinary urge suppression     The knack     Voiding strategies     Nocturia tips     Bowel/Bladder log     Bowel health education     Constipation care     Diarrhea/Fecal leakage     Colonic massage     Toilet positioning     Defecation dynamics     Sources of fiber     Return to intercourse/Dilator training     Sexual positioning     Lubricants/vaginal moisturizers     Vaginal irritants     Body mechanics     Posture/ergonomics     Diaphragmatic breathing     Resources and technology          Treatment/Session Summary:    Treatment Assessment:     Communication/Consultation:  None today  Equipment provided today:  None  Recommendations/Intent for next treatment session: Next visit will focus on biofeedback and bladder training. Total Treatment Billable Duration:  10 minutes  Time In: 1400  Time Out: Liana Mix.  Bryon Awad, PT       Charge Capture  }Post Session Pain  PT Visit Info  [facility] Portal  MD Guidelines  Scanned Media  Benefits  MyChart    Future Appointments   Date Time Provider Amara Ceron   12/13/2022  2:00 PM KIMBERLEE Cruz - CNP [id number]   12[facility]19[facility]2022  [facility]   12/27/2022  2:00 PM [facility], PT [facility]   1/3/2023  2:00 PM [facility]   [facility]  [facility]   1/12/2023  3:00 PM Oswaldo Benjamin MD [facility]   [facility]18[facility]2023  [facility]   1/24/2023  2:00 PM [facility]   1/26/2023  4:00 PM Lamar Fine MD [facility] Neurology -

## 2022-12-05 NOTE — THERAPY EVALUATION
Luis A Chinmay Cross  : 1970  Primary: Zenia Epstein  Secondary:  [address] @ [employer] [address] [address]  Phone: [phone]  Fax: [phone] Plan Frequency: one time a week for 90 days  Plan of Care/Certification Expiration Date: 23    PT Visit Info:         Visit Count:  1                OUTPATIENT PHYSICAL THERAPY:             OP NOTE TYPE: Initial Assessment 2022               Episode  Appt Desk         Treatment Diagnosis:  Lack of coordination (muscle incoordination) (R27.8)  Pelvic floor dysfunction in female (M62.98)  Incontinence without sensory awareness (N39.42)   Constipation, unspecified (K59.00)  Medical/Referring Diagnosis:  OAB (overactive bladder) [N32.81]  Urinary incontinence, urge [N39.41]  Constipation, unspecified constipation type [K59.00]  Referring Physician:  KIMBERLEE Hawthorne - CNP  MD Orders:  PT Eval and Treat   Return MD Appt:  Dec 2022  Date of Onset:       Allergies:  Patient has no known allergies. Restrictions/Precautions:           Medications Last Reviewed:  2022     SUBJECTIVE   History of Injury/Illness (Reason for Referral):  Ms. Amena Pritchett is a 48 yo female referred to pelvic floor physical therapy (PFPT) by KIMBERLEE Hawthorne -* 2/2 OAB (overactive bladder) [N32.81]  Urinary incontinence, urge [N39.41]  Constipation, unspecified constipation type [K59.00] Pt reports that symptoms began 2022. For a year she was getting treated for MS because they did CT scan (not MRI) and they found a ruptured disc in her neck which was causing a spinal cord injury. Did a MRI of her back and didn't find anything. She is numb on her whole left side. She then had surgery on her neck. She was good for a few months and then it slowly started getting worse again. Had a nerve conduction study but came back normal. Her right leg is spastic. Both of her legs are weak. Is in PT now for strengthening.  She is getting a sensation to her bladder but its too late and she will loose control. She has no control over when she will loose urine. She got a urine test done and found out she had  UTI. The doctor did an ultrasound and was told she is emptying her bladder. She leaks a lot of urine. Urinary: Frequency every 30 minutes x/day, 3 x/night. Positive for stress urinary incontinence, urge urinary incontinence, urinary urgency, urinary frequency, incomplete emptying, urinary hesitancy/intermittency, and nocturia. Negative for dysuria, hematuria, and nocturnal enuresis. Pt does use pads for urinary protection; 3-4 pads per day (PPD). Fluid intake: couple of cups water/day; bladder irritants include: 1 bottle of coke zero. Pt does limit fluid intake due to bladder control. Bowel: Frequency once every two weeks. Has to stick her finger up her rectum to get the stool out. The stool is soft  Positive for pushing/straining with bowel movement, constipation, and incomplete emptying. Negative for pain with bowel movement and fecal incontinence. Pt does not use pads for bowel protection; 0 pads per day (PPD). Use of stool softeners or laxatives? Yes, miralax and milk of magnesia    Sexual: Pt is  sexually active with male partners. Contraception/birth control: yes. negative for dyspareunia. History of sexual abuse: No    Pelvic Organ Prolapse/Pelvic Pain: Location: none    OB History: Number of pregnancies: 0 Number of vaginal deliveries: 0 Number of c-sections: 0. Initial:     0/10 Post Session:      /10  Past Medical History/Comorbidities:   Ms. 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   Ms. Radha Simpson  has a past surgical history that includes orthopedic surgery; gyn (1990s); heent; Colonoscopy; orthopedic surgery (Right); and Colonoscopy (N/A, 10/20/2022). Social History/Living Environment:   Lives With: Significant other     Prior Level of Function/Work/Activity:   Prior level of function: Independent  Occupation: Full time employment  Type of Occupation: Assembly line  Leisure & Hobbies: none           Learning:   Does the patient/guardian have any barriers to learning?: No barriers  Will there be a co-learner?: No  What is the preferred language of the patient/guardian?: English  Is an  required?: No  How does the patient/guardian prefer to learn new concepts?: Listening; Reading; Demonstration; Pictures/Videos     Fall Risk Scale:    Palafox Total Score: 25  Palafox Fall Risk: Medium (25-44)           OBJECTIVE   OBSERVATION:   External Observations:  Voluntary contraction: [] absent     [x] present  Involuntary contraction: [x] absent     [] present  Involuntary relaxation: [x] absent     [] present  Perineal descent at rest: [] absent     [x] present  Perineal descent with bearing: [] absent     [x] present  Gait: antalgic, rigid, wide HUMBLE, slight ataxic     Pelvic Floor Muscle (PFM) Assessment:  Vaginal vault size: [] decreased     [] increased     [x] WNL  Muscle volume: [] decreased     [x] WNL     [] Defect  PFM tension: [] decreased     [x] increased (questionable spasticity?)    [] WNL    Contraction ability:  Voluntary contraction: [] absent     [x] weak     [] moderate      [] strong  Voluntary relaxation: [] absent     [x] partial     [] complete   MMT: 1/5   Quality of contractions: uncoordinated  Overflow: [] absent     [x] min     [] mod     [] severe / Compensatory mm groups include abdominals  Levator Avulsion: [x] Negative  [] Positive    Tissue laxity test:  Anterior wall: [] minimum     [] moderate     [] severe      [x] WNL  Posterior wall: [] minimum     [] moderate     [] severe      [x] WNL      Palpation: via vaginal canal   Superficial Pelvic Floor Musculature (PFM): Tender? Intermediate PFM Tender? Deep PFM Tender? Superficial Transverse Perineal [] Right  [] Left  []DNT Deep Transverse Perineal [] Right  [] Left  []DNT Puborectalis [] Right  [] Left  []DNT   Ischiocavernosus [] Right  [] Left  []DNT Compressor Urethra [] Right  [] Left  []DNT Pubococcygeus [] Right  [] Left  []DNT   Bulbocavernosus [] Right  [] Left  []DNT   Ileococcygeus [] Right  [] Left  []DNT       Obturator Internus [] Right  [] Left  []DNT       Coccygeus [] Right  [] Left  []DNT     NEURO:  -Clonus negative  -Right lower extremity spasticity noted with knee flexion/extension and dorsi/plantarflexion  -Loss of sensation left lower extremity and left vaginal wall    ASSESSMENT   Initial Assessment: Bailey Skipper presents with musculoskeletal limitations including pelvic floor muscle (PFM) tension, reduced PFM Range of Motion (ROM), poor posture, altered body mechanics, reduced coordination and awareness of PFM, and decreased pelvic floor muscle (PFM) strength. These limitations are impairing the patient's ability to properly coordinate perineal elevation and descent for normalized PFM function, contributing to voiding dysfunction. As a result, she is limited in her/his ability to participate in household chores, physical activities such as walking, swimming, or other exercise, entertainment activities such as going to a movie or concert, traveling by car or bus for a distance greater then 30 minutes away from home, and social activities outside of the home. Problem List: (Impacting functional limitations): Body Structures, Functions, Activity Limitations Requiring Skilled Therapeutic Intervention: Decreased functional mobility ; Decreased ADL status; Decreased body mechanics;  Decreased strength; Decreased endurance; Decreased balance; Decreased sensation; Decreased high-level IADLs; Decreased fine motor control; Decreased coordination; Increased pain; Decreased posture     Therapy Prognosis:   Therapy Prognosis: Fair     Initial Assessment Complexity:   Decision Making: High Complexity    PLAN   Effective Dates: 12-6-22 TO Plan of Care/Certification Expiration Date: 03/06/23   Frequency/Duration: Plan Frequency: one time a week for 90 days   Interventions Planned (Treatment may consist of any combination of the following):    Current Treatment Recommendations: Strengthening; ADL/Self-care training; Neuromuscular re-education; Manual; Return to work related activity; Home exercise program; Safety education & training; Patient/Caregiver education & training; Equipment evaluation, education, & procurement; Therapeutic activities     Goals: (Goals have been discussed and agreed upon with patient.)  Short-Term Functional Goals: Time Frame: 6 weeks  Pt will demonstrate I with basic PFM HEP to improve awareness, coordination, and timing of PFM. Patient will demonstrate understanding of and ability to teach back appropriate water and fiber intake, toileting frequency, and positioning for improved self-management of symptoms. Patient will demonstrate understanding of and ability to teach back two strategies to reduce constipation and improve toileting to minimize strain on and/or paradoxical movement of the pelvic floor muscles. Patient will demonstrate ability to perform diaphragmatic breathing in multiple positions in order to improve pelvic floor muscle (PFM) lengthening and reduced discomfort and/or straining to aid in bowel evacuation. Patient will demonstrate ability to isolate a pelvic floor contraction without breath holding and minimal to no accessory muscle use in order to implement the knack and/or urge suppression, reducing pad usage by 1.   Patient will demonstrate appropriate use of the pelvic floor muscle group (quick flicks and/or drops), without compensation, to implement urge suppression appropriately with urgency of urination and decrease the number of pads per day or UI episodes by 1. Discharge Goals: Time Frame: 12 weeks  Patient will demonstrate independence with an advanced HEP for general conditioning, core stabilization, and mobility to facilitate carry over and independent management of symptoms. Patient will be independent with implementation of a timed voiding schedule and use of urge suppression to reduce urinary frequency. Pelvic Floor Impact Questionnaire--short form 7 (PFIQ-7):  Score:  Initial:   Bladder or Urine: 76/100  Bowel or Rectum: 28/100  Vagina or Pelvis: 0/100 Most Recent: X (Date: -- )  Bladder or Urine: X/100  Bowel or Rectum: X/100  Vagina or Pelvis: X/100   Interpretation of Score: Each of the 7 sections is scored on a scale from 0-3; 0 representing \"Not at all\", 3 representing \"Quite a bit\". The mean value is taken from all the answered items, then multiplied by 100 to obtain the scale score, ranging from 0-100. This process is repeated for each column representing bowel, bladder, and pelvic pain. Medical Necessity:   > Patient demonstrates good rehab potential due to higher previous functional level. Reason For Services/Other Comments:  > Patient continues to require skilled intervention due to above mentioned deficits. Total Duration:  Time In: 1400  Time Out: 1500    Regarding Garrison Phillips's therapy, I certify that the treatment plan above will be carried out by a therapist or under their direction. Thank you for this referral,  Brian Du. Maxie Meigs, PT     Referring Physician Signature: KIMBERLEE Sales -* No Signature is Required for this note.         Post Session Pain  Charge Capture  PT Visit Info MD Guidelines  [facility]

## 2022-12-06 ENCOUNTER — TELEMEDICINE (OUTPATIENT)
Dept: NEUROLOGY | Age: 52
End: 2022-12-06
Payer: COMMERCIAL

## 2022-12-06 ENCOUNTER — HOSPITAL ENCOUNTER (OUTPATIENT)
Dept: PHYSICAL THERAPY | Age: 52
Setting detail: RECURRING SERIES
Discharge: HOME | End: 2022-12-09
Payer: COMMERCIAL

## 2022-12-06 DIAGNOSIS — R29.898 WEAKNESS OF BOTH LEGS: Primary | ICD-10-CM

## 2022-12-06 DIAGNOSIS — R20.0 NUMBNESS IN LEFT LEG: ICD-10-CM

## 2022-12-06 DIAGNOSIS — G95.9 CERVICAL MYELOPATHY (HCC): ICD-10-CM

## 2022-12-06 PROCEDURE — 97110 THERAPEUTIC EXERCISES: CPT

## 2022-12-06 PROCEDURE — 99213 OFFICE O/P EST LOW 20 MIN: CPT | Performed by: PSYCHIATRY & NEUROLOGY

## 2022-12-06 PROCEDURE — 97163 PT EVAL HIGH COMPLEX 45 MIN: CPT

## 2022-12-06 ASSESSMENT — PAIN SCALES - GENERAL: PAINLEVEL_OUTOF10: 0

## 2022-12-06 NOTE — PROGRESS NOTES
Blas Phillips (:  1970) is a Established patient, here for evaluation of the following:    Assessment & Plan   Below is the assessment and plan developed based on review of pertinent history, physical exam, labs, studies, and medications. 1. Weakness of both legs  2. Numbness in left leg  3. Cervical myelopathy (HCC)  Based on PT report, bilateral lower limb function has been partially improved with PT but weakness persists. Return if symptoms worsen or fail to improve. Subjective   Reviewed PT report: Right leg proximal weakness 3- worse than distal 4, left leg 4. Function showed improvement with PT. Patient requests to go back to work but her was physical and lifting weight. She will need to go back with light duty restriction to avoid falling. Review of Systems   Neurological:  Positive for sensory change and weakness.          Objective   Patient-Reported Vitals  No data recorded     Physical Exam  [INSTRUCTIONS:  \"[x]\" Indicates a positive item  \"[]\" Indicates a negative item  -- DELETE ALL ITEMS NOT EXAMINED]    Constitutional: [x] Appears well-developed and well-nourished [x] No apparent distress      [] Abnormal -     Mental status: [x] Alert and awake  [x] Oriented to person/place/time [x] Able to follow commands    [] Abnormal -     Eyes:   EOM    [x]  Normal    [] Abnormal -   Sclera  [x]  Normal    [] Abnormal -          Discharge [x]  None visible   [] Abnormal -     HENT: [x] Normocephalic, atraumatic  [] Abnormal -   [x]     External Ears [x] Normal  [] Abnormal -    Neck: [x] [] Abnormal -     Pulmonary/Chest: [x] Respiratory effort normal   [x] No visualized signs of difficulty breathing or respiratory distress        [] Abnormal -      Musculoskeletal:   [] Normal gait with no signs of ataxia - not tested, in car        [x] Normal range of motion of neck        [] Abnormal -     Neurological:        [x] No Facial Asymmetry (Cranial nerve 7 motor function) (limited exam due to video visit)          [x] No gaze palsy        [] Abnormal -          Skin:        [x]          [] Abnormal -            Psychiatric:       [x] Normal Affect [] Abnormal -        [x] No Hallucinations    Other pertinent observable physical exam findings:-         On this date 12/6/2022 I have spent 25 minutes reviewing previous notes, test results and face to face (virtual) with the patient discussing the diagnosis and importance of compliance with the treatment plan as well as documenting on the day of the visit. Shannanchyna Beauchamp, was evaluated through a synchronous (real-time) audio-video encounter. The patient (or guardian if applicable) is aware that this is a billable service, which includes applicable co-pays. This Virtual Visit was conducted with patient's (and/or legal guardian's) consent. The visit was conducted pursuant to the emergency declaration under the [address] [place] [address], [address] [place] [address] authority and the [employer]. and The [employer] Act. Patient identification was verified, and a caregiver was present when appropriate. The patient was located at Other: in car not driving .    Provider was located at Home ([address]): Kristen Allen MD

## 2022-12-07 ENCOUNTER — PATIENT MESSAGE (OUTPATIENT)
Dept: NEUROLOGY | Age: 52
End: 2022-12-07

## 2022-12-07 NOTE — TELEPHONE ENCOUNTER
From: Bailey Mcfadden  To: Dr. Bibiana Reed: 12/7/2022 7:45 AM EST  Subject: Return to work     Hr needs copy of letter of restrictions and how long

## 2022-12-13 ENCOUNTER — OFFICE VISIT (OUTPATIENT)
Dept: UROLOGY | Age: 52
End: 2022-12-13
Payer: COMMERCIAL

## 2022-12-13 DIAGNOSIS — N32.81 OAB (OVERACTIVE BLADDER): Primary | ICD-10-CM

## 2022-12-13 DIAGNOSIS — R30.0 DYSURIA: ICD-10-CM

## 2022-12-13 LAB
BILIRUBIN, URINE, POC: NEGATIVE
BLOOD URINE, POC: NORMAL
GLUCOSE URINE, POC: NEGATIVE
KETONES, URINE, POC: NEGATIVE
LEUKOCYTE ESTERASE, URINE, POC: NEGATIVE
NITRITE, URINE, POC: NEGATIVE
PH, URINE, POC: 5.5 (ref 4.6–8)
PROTEIN,URINE, POC: NEGATIVE
SPECIFIC GRAVITY, URINE, POC: 1.02 (ref 1–1.03)
URINALYSIS CLARITY, POC: NORMAL
URINALYSIS COLOR, POC: NORMAL
UROBILINOGEN, POC: NORMAL

## 2022-12-13 PROCEDURE — 99214 OFFICE O/P EST MOD 30 MIN: CPT | Performed by: NURSE PRACTITIONER

## 2022-12-13 PROCEDURE — 81003 URINALYSIS AUTO W/O SCOPE: CPT | Performed by: NURSE PRACTITIONER

## 2022-12-13 ASSESSMENT — ENCOUNTER SYMPTOMS
ABDOMINAL PAIN: 0
BACK PAIN: 0

## 2022-12-19 ENCOUNTER — HOSPITAL ENCOUNTER (OUTPATIENT)
Dept: PHYSICAL THERAPY | Age: 52
Setting detail: RECURRING SERIES
Discharge: HOME | End: 2022-12-22
Payer: COMMERCIAL

## 2022-12-19 PROCEDURE — 97110 THERAPEUTIC EXERCISES: CPT

## 2022-12-19 PROCEDURE — 97112 NEUROMUSCULAR REEDUCATION: CPT

## 2022-12-19 NOTE — PROGRESS NOTES
Lowell Giovana Phillips  : 1970  Primary: Floyd Epstein  Secondary:  Cameron Umana @ [facility] [address] [address] [address]  Phone: [phone]  Fax: [phone] Plan Frequency: one time a week for 90 days  Plan of Care/Certification Expiration Date: 23      PT Visit Info:  No data recorded   Visit Count:  2   OUTPATIENT PHYSICAL THERAPY:OP NOTE TYPE: Treatment Note 2022       Episode  }Appt Desk             Treatment Diagnosis:  Lack of coordination (muscle incoordination) (R27.8)  Pelvic floor dysfunction in female (M62.98)  Incontinence without sensory awareness (N39.42)   Constipation, unspecified (K59.00)  Medical/Referring Diagnosis:  OAB (overactive bladder) [N32.81]  Urinary incontinence, urge [N39.41]  Constipation, unspecified constipation type [K59.00]  Referring Physician:  KIMBERLEE Rose - CNP  MD Orders:  PT Eval and Treat   Date of Onset:  No data recorded   Allergies:   Patient has no known allergies. Restrictions/Precautions:  No data recorded  No data recorded   Interventions Planned (Treatment may consist of any combination of the following):    Current Treatment Recommendations: Strengthening; ADL/Self-care training; Neuromuscular re-education; Manual; Return to work related activity; Home exercise program; Safety education & training; Patient/Caregiver education & training; Equipment evaluation, education, & procurement; Therapeutic activities     Subjective Comments: Increasing the mybetric has helped with daytime and nighttime urination. She doesn't have the urgency.       Initial:}     /10Post Session:        /10  Medications Last Reviewed:  2022, Myrbetric 50 mg, Northindrone, gabba pentin  Updated Objective Findings:   Ms. Barnard is a 46 yo female referred to pelvic floor physical therapy (PFPT) by KIMBERLEE Rose -* 2/2 OAB (overactive bladder) [N32.81]  Urinary incontinence, urge [N39.41]  Constipation, unspecified constipation type [K59.00] Pt reports that symptoms began march 2022. For a year she was getting treated for MS because they did CT scan (not MRI) and they found a ruptured disc in her neck which was causing a spinal cord injury. Did a MRI of her back and didn't find anything. She is numb on her whole left side. She then had surgery on her neck. She was good for a few months and then it slowly started getting worse again. Had a nerve conduction study but came back normal. Her right leg is spastic. Both of her legs are weak. Is in PT now for strengthening. She is getting a sensation to her bladder but its too late and she will loose control. She has no control over when she will loose urine. She got a urine test done and found out she had  UTI. The doctor did an ultrasound and was told she is emptying her bladder. She leaks a lot of urine. Urinary: Frequency every 30 minutes x/day, 3 x/night. Positive for stress urinary incontinence, urge urinary incontinence, urinary urgency, urinary frequency, incomplete emptying, urinary hesitancy/intermittency, and nocturia. Negative for dysuria, hematuria, and nocturnal enuresis. Pt does use pads for urinary protection; 3-4 pads per day (PPD). Fluid intake: couple of cups water/day; bladder irritants include: 1 bottle of coke zero. Pt does limit fluid intake due to bladder control. Bowel: Frequency once every two weeks. Has to stick her finger up her rectum to get the stool out. The stool is soft  Positive for pushing/straining with bowel movement, constipation, and incomplete emptying. Negative for pain with bowel movement and fecal incontinence. Pt does not use pads for bowel protection; 0 pads per day (PPD). Use of stool softeners or laxatives? Yes, miralax and milk of magnesia     Sexual: Pt is  sexually active with male partners. Contraception/birth control: yes. negative for dyspareunia.   History of sexual abuse: No     Pelvic Organ Prolapse/Pelvic Pain: Location: none     OB History: Number of pregnancies: 0 Number of vaginal deliveries: 0 Number of c-sections: 0. External Observations:  Voluntary contraction: [] absent     [x] present  Involuntary contraction: [x] absent     [] present  Involuntary relaxation: [x] absent     [] present  Perineal descent at rest: [] absent     [x] present  Perineal descent with bearing: [] absent     [x] present  Gait: antalgic, rigid, wide HUMBLE, slight ataxic      Pelvic Floor Muscle (PFM) Assessment:  Vaginal vault size: [] decreased     [] increased     [x] WNL  Muscle volume: [] decreased     [x] WNL     [] Defect  PFM tension: [] decreased     [x] increased (questionable spasticity?)    [] WNL     Contraction ability:  Voluntary contraction: [] absent     [x] weak     [] moderate      [] strong  Voluntary relaxation: [] absent     [x] partial     [] complete   MMT: 1/5   Quality of contractions: uncoordinated  Overflow: [] absent     [x] min     [] mod     [] severe / Compensatory mm groups include abdominals  Levator Avulsion: [x] Negative  [] Positive     Tissue laxity test:  Anterior wall: [] minimum     [] moderate     [] severe      [x] WNL  Posterior wall: [] minimum     [] moderate     [] severe      [x] WNL        Palpation: via vaginal canal   Superficial Pelvic Floor Musculature (PFM): Tender? Intermediate PFM Tender? Deep PFM Tender?    Superficial Transverse Perineal [] Right  [] Left  []DNT Deep Transverse Perineal [] Right  [] Left  []DNT Puborectalis [] Right  [] Left  []DNT   Ischiocavernosus [] Right  [] Left  []DNT Compressor Urethra [] Right  [] Left  []DNT Pubococcygeus [] Right  [] Left  []DNT   Bulbocavernosus [] Right  [] Left  []DNT     Ileococcygeus [] Right  [] Left  []DNT           Obturator Internus [] Right  [] Left  []DNT           Coccygeus [] Right  [] Left  []DNT      NEURO:  -Clonus negative  -Right lower extremity spasticity noted with knee flexion/extension and dorsi/plantarflexion  -Loss of sensation left lower extremity and left vaginal wall    Treatment   THERAPEUTIC EXERCISE: (15 minutes):    Exercises per grid below to improve mobility, strength, and coordination. Required minimal visual, verbal, manual, and tactile cues to promote proper body alignment, promote proper body posture, promote proper body mechanics, and promote proper body breathing techniques. Progressed resistance, range, repetitions, and complexity of movement as indicated. Date:  12-6-22 Date:  12-19-22 Date:     Activity/Exercise Parameters Parameters Parameters   Patient Education Discussed HEP and POC     Squberta perkins Reviewed and given handout     Kegel with drop Reviewed and given handout     Seated adduction with ball  X20      Seated abduction with band  X20      Seated march  X20 B          All exercises performed with emphasis on kegel and breathing in order improve coordination, awareness and to minimize symptoms. MANUAL THERAPY: ( minutes): to improve soft tissue tone    Date Type Location Comments   12/19/2022 Internal assessment/treatment                                         (Used abbreviations: MET - muscle energy technique; SCS- Strain counter strain; CTM-Connective tissue mobilizations; CR- Contract/Relax; SP- Sustained pressure; PIT- Positional inhibition techniques; STM Soft -tissue mobilization; MM- Myofascial mobilization; TrP-Trigger point release; IASTM- Instrument assisted soft tissue mobilizations, TDN-Trigger point dry needling)    Pt gives verbal consent to internal vaginal assessment/treatment without chaperon present. NEURO REEDUCATION: (45 minutes) to improve control and coordination of pelvic floor     Date:  12-19-22 Date:   Date:     Activity/Exercise Parameters Parameters Parameters   Biofeedback With sEMG was utilized for coordination of PFM.  Supine, sitting, standing                                               THERAPEUTIC ACTIVITY: ( minutes):     DINORA Educational Topic Notes Date Completed   Pathology/Anatomy/PFM Function     Bladder health education     Urinary urge suppression     The knack     Voiding strategies     Nocturia tips     Bowel/Bladder log     Bowel health education     Constipation care     Diarrhea/Fecal leakage     Colonic massage     Toilet positioning     Defecation dynamics     Sources of fiber     Return to intercourse/Dilator training     Sexual positioning     Lubricants/vaginal moisturizers     Vaginal irritants     Body mechanics     Posture/ergonomics     Diaphragmatic breathing     Resources and technology          Treatment/Session Summary:    Treatment Assessment:Patient demonstrated good coordination of PFM on biofeedback     Communication/Consultation:  None today  Equipment provided today:  None  Recommendations/Intent for next treatment session: Next visit will focus on coordination of PFM. Total Treatment Billable Duration:  15 minutes there ex, 40 minutes neuro   Time In: 1500  Time Out: [address]  Julia Medrano, PT       Charge Capture  }Post Session Pain  PT Visit [facility] Portal  MD [address]    Future Appointments   Date Time Provider Amara Ceron   12/27/2022  2:00 PM Skyler Espinal, PT [facility]   1/3/2023  2:00 PM Skyler Espinal PT [facility]   1/10/2023  2:00 PM Skyler Espinal, PT [facility]   1/12/2023  3:00 PM MD RAYNE Cantu [facility]   1/16/2023  1:15 PM KIMBERLEE Tineo - CNP [id number] [facility]   1/18/2023  2:00 PM Skyler Espinal, PT [facility]   1/24/2023  2:00 PM Skyler Espinal PT [facility]   1/26/2023  4:00 PM Alyssa Roger MD [facility] Neurology -

## 2022-12-27 ENCOUNTER — APPOINTMENT (OUTPATIENT)
Dept: PHYSICAL THERAPY | Age: 52
End: 2022-12-27
Payer: COMMERCIAL

## 2023-01-02 NOTE — PROGRESS NOTES
Felice Lazo Cross  : 1970  Primary: Mckenzie Epstein  Secondary:  [address] @ [employer] [place] [address] [address]  Phone: [phone]  Fax: [phone] Plan Frequency: one time a week for 90 days  Plan of Care/Certification Expiration Date: 23      PT Visit Info:  No data recorded   Visit Count:  3   OUTPATIENT PHYSICAL THERAPY:OP NOTE TYPE: Treatment Note 1/3/2023       Episode  }Appt Desk             Treatment Diagnosis:  Lack of coordination (muscle incoordination) (R27.8)  Pelvic floor dysfunction in female (M62.98)  Incontinence without sensory awareness (N39.42)   Constipation, unspecified (K59.00)  Medical/Referring Diagnosis:  OAB (overactive bladder) [N32.81]  Urinary incontinence, urge [N39.41]  Constipation, unspecified constipation type [K59.00]  Referring Physician:  KIMBERLEE Brock - CNP  MD Orders:  PT Eval and Treat   Date of Onset:  No data recorded   Allergies:   Patient has no known allergies. Restrictions/Precautions:  No data recorded  No data recorded   Interventions Planned (Treatment may consist of any combination of the following):    Current Treatment Recommendations: Strengthening; ADL/Self-care training; Neuromuscular re-education; Manual; Return to work related activity; Home exercise program; Safety education & training; Patient/Caregiver education & training; Equipment evaluation, education, & procurement; Therapeutic activities     Subjective Comments: Patient reports she can hold the bladder better. She is getting an urge. Now she can't get it to come out and has to push to get all of it out.       Initial:}     /10Post Session:        /10  Medications Last Reviewed:  1/3/2023, Myrbetric 50 mg, Northindrone, gabba pentin  Updated Objective Findings:   Ms. Jay Dougherty is a 46 yo female referred to pelvic floor physical therapy (PFPT) by KIMBERLEE Brock -* 2/2 OAB (overactive bladder) [N32.81]  Urinary incontinence, urge [N39.41]  Constipation, unspecified constipation type [K59.00] Pt reports that symptoms began march 2022. For a year she was getting treated for MS because they did CT scan (not MRI) and they found a ruptured disc in her neck which was causing a spinal cord injury. Did a MRI of her back and didn't find anything. She is numb on her whole left side. She then had surgery on her neck. She was good for a few months and then it slowly started getting worse again. Had a nerve conduction study but came back normal. Her right leg is spastic. Both of her legs are weak. Is in PT now for strengthening. She is getting a sensation to her bladder but its too late and she will loose control. She has no control over when she will loose urine. She got a urine test done and found out she had  UTI. The doctor did an ultrasound and was told she is emptying her bladder. She leaks a lot of urine. Urinary: Frequency 3 x/day, 0-1 x/night. Positive for stress urinary incontinence,  incomplete emptying, urinary hesitancy/intermittency  Negative for urge urinary incontinence, urinary urgency, urinary frequency, dysuria, hematuria, nocturia and nocturnal enuresis. Pt does use pads for urinary protection; 3-4 pads per day (PPD). Fluid intake: couple of cups water/day; bladder irritants include: 1 bottle of coke zero. Pt does limit fluid intake due to bladder control. Bowel: Frequency one to two times a week  Positive for pushing/straining with bowel movement, constipation, and incomplete emptying. Negative for pain with bowel movement and fecal incontinence. Pt does not use pads for bowel protection; 0 pads per day (PPD). Use of stool softeners or laxatives? Yes, miralax and milk of magnesia     Sexual: Pt is  sexually active with male partners. Contraception/birth control: yes. negative for dyspareunia.   History of sexual abuse: No     Pelvic Organ Prolapse/Pelvic Pain: Location: none     OB History: Number of pregnancies: 0 Number of vaginal deliveries: 0 Number of c-sections: 0. External Observations:  Voluntary contraction: [] absent     [x] present  Involuntary contraction: [x] absent     [] present  Involuntary relaxation: [x] absent     [] present  Perineal descent at rest: [] absent     [x] present  Perineal descent with bearing: [] absent     [x] present  Gait: antalgic, rigid, wide HUMBLE, slight ataxic      Pelvic Floor Muscle (PFM) Assessment:  Vaginal vault size: [] decreased     [] increased     [x] WNL  Muscle volume: [] decreased     [x] WNL     [] Defect  PFM tension: [] decreased     [x] increased (questionable spasticity?)    [] WNL     Contraction ability:  Voluntary contraction: [] absent     [x] weak     [] moderate      [] strong  Voluntary relaxation: [] absent     [x] partial     [] complete   MMT: 1/5   Quality of contractions: uncoordinated  Overflow: [] absent     [x] min     [] mod     [] severe / Compensatory mm groups include abdominals  Levator Avulsion: [x] Negative  [] Positive     Tissue laxity test:  Anterior wall: [] minimum     [] moderate     [] severe      [x] WNL  Posterior wall: [] minimum     [] moderate     [] severe      [x] WNL        Palpation: via vaginal canal   Superficial Pelvic Floor Musculature (PFM): Tender? Intermediate PFM Tender? Deep PFM Tender?    Superficial Transverse Perineal [] Right  [] Left  []DNT Deep Transverse Perineal [] Right  [] Left  []DNT Puborectalis [] Right  [] Left  []DNT   Ischiocavernosus [] Right  [] Left  []DNT Compressor Urethra [] Right  [] Left  []DNT Pubococcygeus [] Right  [] Left  []DNT   Bulbocavernosus [] Right  [] Left  []DNT     Ileococcygeus [] Right  [] Left  []DNT           Obturator Internus [] Right  [] Left  []DNT           Coccygeus [] Right  [] Left  []DNT      NEURO:  -Clonus negative  -Right lower extremity spasticity noted with knee flexion/extension and dorsi/plantarflexion  -Loss of sensation left lower extremity and left vaginal wall    Treatment   THERAPEUTIC EXERCISE: (55 minutes):    Exercises per grid below to improve mobility, strength, and coordination. Required minimal visual, verbal, manual, and tactile cues to promote proper body alignment, promote proper body posture, promote proper body mechanics, and promote proper body breathing techniques. Progressed resistance, range, repetitions, and complexity of movement as indicated. Date:  12-6-22 Date:  12-19-22 Date:     Activity/Exercise Parameters Parameters Parameters   Patient Education Discussed HEP and POC     Squatty potty Reviewed and given handout     Kegel with drop Reviewed and given handout     Seated adduction with ball  X20   X20   Seated abduction with band  X20   X20   Seated march  X20 B    X20 B  blue band   2nd position bridge   X20 yellow ball     Sit to stand   X20     Quadruped leg extension   X20     Sidestep   In place      All exercises performed with emphasis on kegel and breathing in order improve coordination, awareness and to minimize symptoms. MANUAL THERAPY: ( minutes): to improve soft tissue tone    Date Type Location Comments   1/3/2023 Internal assessment/treatment                                         (Used abbreviations: MET - muscle energy technique; SCS- Strain counter strain; CTM-Connective tissue mobilizations; CR- Contract/Relax; SP- Sustained pressure; PIT- Positional inhibition techniques; STM Soft -tissue mobilization; MM- Myofascial mobilization; TrP-Trigger point release; IASTM- Instrument assisted soft tissue mobilizations, TDN-Trigger point dry needling)    Pt gives verbal consent to internal vaginal assessment/treatment without chaperon present. NEURO REEDUCATION: (0 minutes) to improve control and coordination of pelvic floor     Date:  12-19-22 Date:   Date:     Activity/Exercise Parameters Parameters Parameters   Biofeedback With sEMG was utilized for coordination of PFM.  Supine, sitting, standing THERAPEUTIC ACTIVITY: ( minutes):     TA Educational Topic Notes Date Completed   Pathology/Anatomy/PFM Function     Bladder health education     Urinary urge suppression     The knack     Voiding strategies     Nocturia tips     Bowel/Bladder log     Bowel health education     Constipation care     Diarrhea/Fecal leakage     Colonic massage     Toilet positioning     Defecation dynamics     Sources of fiber     Return to intercourse/Dilator training     Sexual positioning     Lubricants/vaginal moisturizers     Vaginal irritants     Body mechanics     Posture/ergonomics     Diaphragmatic breathing     Resources and technology          Treatment/Session Summary:    Treatment Assessment:Patient able to progress there ex today. Communication/Consultation:  None today  Equipment provided today:  None  Recommendations/Intent for next treatment session: Next visit will focus on coordination of PFM. Total Treatment Billable Duration:  55 minutes there ex  Time In: 1400  Time Out: [facility].  Fannie Samuel, PT       Charge Capture  }Post Session Pain  PT Visit Info  [facility] Portal  MD Guidelines  Scanned Media  Benefits  MyChart    Future Appointments   Date Time Provider Amara Ceron   1/10/2023  2:00 PM Ted Wells [facility]   1/12/2023  3:00 PM Maria G Wilcox MD [facility]   1/16/2023  1:15 PM Ava Gupta APRN - CNP [id number] [facility]   1/18/2023  2:00 PM Aleksandar Hawley PT [facility]   1/24/2023  2:00 PM Aleksandar Hawley PT [facility]   1/26/2023  4:00 PM Tamia Campuzano MD [facility]

## 2023-01-03 ENCOUNTER — HOSPITAL ENCOUNTER (OUTPATIENT)
Dept: PHYSICAL THERAPY | Age: 53
Setting detail: RECURRING SERIES
Discharge: HOME | End: 2023-01-06
Payer: COMMERCIAL

## 2023-01-03 PROCEDURE — 97110 THERAPEUTIC EXERCISES: CPT

## 2023-01-09 NOTE — PROGRESS NOTES
Karishma Lovell Alan  : 1970  Primary: Chloe Cadena Sc  Secondary:  [address] @ [employer] [facility] [address] [address] [address]  Phone: [phone]  Fax: [phone] Plan Frequency: one time a week for 90 days  Plan of Care/Certification Expiration Date: 23      PT Visit Info:  No data recorded   Visit Count:  4   OUTPATIENT PHYSICAL THERAPY:OP NOTE TYPE: Treatment Note 1/10/2023       Episode  }Appt Desk             Treatment Diagnosis:  Lack of coordination (muscle incoordination) (R27.8)  Pelvic floor dysfunction in female (M62.98)  Incontinence without sensory awareness (N39.42)   Constipation, unspecified (K59.00)  Medical/Referring Diagnosis:  OAB (overactive bladder) [N32.81]  Urinary incontinence, urge [N39.41]  Constipation, unspecified constipation type [K59.00]  Referring Physician:  KIMBERLEE Arora CNP, MD Orders:  PT Eval and Treat   Date of Onset:  No data recorded   Allergies:   Patient has no known allergies. Restrictions/Precautions:  No data recorded  No data recorded   Interventions Planned (Treatment may consist of any combination of the following):    Current Treatment Recommendations: Strengthening; ADL/Self-care training; Neuromuscular re-education; Manual; Return to work related activity; Home exercise program; Safety education & training; Patient/Caregiver education & training; Equipment evaluation, education, & procurement; Therapeutic activities     Subjective Comments: Patient reports not long after last time her bladder started leaking again. She is getting up in the middle of the night now. Her walking is getting worse now.       Initial:}     /10Post Session:        /10  Medications Last Reviewed:  1/10/2023, Myrbetric 50 mg, Northindrone, gabba pentin  Updated Objective Findings:   Ms. Addy Cadena is a 46 yo female referred to pelvic floor physical therapy (PFPT) by KIMBERLEE Arora -* 2/2 OAB (overactive bladder) [N32.81]  Urinary incontinence, urge [N39.41]  Constipation, unspecified constipation type [K59.00] Pt reports that symptoms began march 2022. For a year she was getting treated for MS because they did CT scan (not MRI) and they found a ruptured disc in her neck which was causing a spinal cord injury. Did a MRI of her back and didn't find anything. She is numb on her whole left side. She then had surgery on her neck. She was good for a few months and then it slowly started getting worse again. Had a nerve conduction study but came back normal. Her right leg is spastic. Both of her legs are weak. Is in PT now for strengthening. She is getting a sensation to her bladder but its too late and she will loose control. She has no control over when she will loose urine. She got a urine test done and found out she had  UTI. The doctor did an ultrasound and was told she is emptying her bladder. She leaks a lot of urine. Urinary: Frequency 3 x/day, 0-1 x/night. Positive for stress urinary incontinence,  incomplete emptying, urinary hesitancy/intermittency  Negative for urge urinary incontinence, urinary urgency, urinary frequency, dysuria, hematuria, nocturia and nocturnal enuresis. Pt does use pads for urinary protection; 3-4 pads per day (PPD). Fluid intake: couple of cups water/day; bladder irritants include: 1 bottle of coke zero. Pt does limit fluid intake due to bladder control. Bowel: Frequency one to two times a week  Positive for pushing/straining with bowel movement, constipation, and incomplete emptying. Negative for pain with bowel movement and fecal incontinence. Pt does not use pads for bowel protection; 0 pads per day (PPD). Use of stool softeners or laxatives? Yes, miralax and milk of magnesia     Sexual: Pt is  sexually active with male partners. Contraception/birth control: yes. negative for dyspareunia.   History of sexual abuse: No     Pelvic Organ Prolapse/Pelvic Pain: Location: none OB History: Number of pregnancies: 0 Number of vaginal deliveries: 0 Number of c-sections: 0. External Observations:  Voluntary contraction: [] absent     [x] present  Involuntary contraction: [x] absent     [] present  Involuntary relaxation: [x] absent     [] present  Perineal descent at rest: [] absent     [x] present  Perineal descent with bearing: [] absent     [x] present  Gait: antalgic, rigid, wide HUMBLE, slight ataxic      Pelvic Floor Muscle (PFM) Assessment:  Vaginal vault size: [] decreased     [] increased     [x] WNL  Muscle volume: [] decreased     [x] WNL     [] Defect  PFM tension: [] decreased     [x] increased (questionable spasticity?)    [] WNL     Contraction ability:  Voluntary contraction: [] absent     [x] weak     [] moderate      [] strong  Voluntary relaxation: [] absent     [x] partial     [] complete   MMT: 1/5   Quality of contractions: uncoordinated  Overflow: [] absent     [x] min     [] mod     [] severe / Compensatory mm groups include abdominals  Levator Avulsion: [x] Negative  [] Positive     Tissue laxity test:  Anterior wall: [] minimum     [] moderate     [] severe      [x] WNL  Posterior wall: [] minimum     [] moderate     [] severe      [x] WNL        Palpation: via vaginal canal   Superficial Pelvic Floor Musculature (PFM): Tender? Intermediate PFM Tender? Deep PFM Tender?    Superficial Transverse Perineal [] Right  [] Left  []DNT Deep Transverse Perineal [] Right  [] Left  []DNT Puborectalis [] Right  [] Left  []DNT   Ischiocavernosus [] Right  [] Left  []DNT Compressor Urethra [] Right  [] Left  []DNT Pubococcygeus [] Right  [] Left  []DNT   Bulbocavernosus [] Right  [] Left  []DNT     Ileococcygeus [] Right  [] Left  []DNT           Obturator Internus [] Right  [] Left  []DNT           Coccygeus [] Right  [] Left  []DNT      NEURO:  -Clonus negative  -Right lower extremity spasticity noted with knee flexion/extension and dorsi/plantarflexion  -Loss of sensation left lower extremity and left vaginal wall    Treatment   THERAPEUTIC EXERCISE: (55 minutes):    Exercises per grid below to improve mobility, strength, and coordination. Required minimal visual, verbal, manual, and tactile cues to promote proper body alignment, promote proper body posture, promote proper body mechanics, and promote proper body breathing techniques. Progressed resistance, range, repetitions, and complexity of movement as indicated. Date:  12-6-22 Date:  12-19-22 Date:   Date:  1-10-23   Activity/Exercise Parameters Parameters Parameters    Patient Education Discussed HEP and POC      Squattaviva perkins Reviewed and given handout      Kegel with drop Reviewed and given handout      Seated adduction with ball  X20   X20 X20   Seated abduction with band  X20   X20 X20 blue band   Seated march  X20 B    X20 B  blue band X20 B blue band   2nd position bridge   X20 yellow ball   X20 yellow ball   Sit to stand   X20   X20    Quadruped leg extension   X20      Sidestep   In place    Standing shoulder extension      X20 with yellow t-band   Standing chops      X20 B yellow t-band      All exercises performed with emphasis on kegel and breathing in order improve coordination, awareness and to minimize symptoms. MANUAL THERAPY: ( minutes): to improve soft tissue tone    Date Type Location Comments   1/10/2023 Internal assessment/treatment                                         (Used abbreviations: MET - muscle energy technique; SCS- Strain counter strain; CTM-Connective tissue mobilizations; CR- Contract/Relax; SP- Sustained pressure; PIT- Positional inhibition techniques; STM Soft -tissue mobilization; MM- Myofascial mobilization; TrP-Trigger point release; IASTM- Instrument assisted soft tissue mobilizations, TDN-Trigger point dry needling)    Pt gives verbal consent to internal vaginal assessment/treatment without chaperon present.     NEURO REEDUCATION: (0 minutes) to improve control and coordination of pelvic floor     Date:  12-19-22 Date:   Date:     Activity/Exercise Parameters Parameters Parameters   Biofeedback With sEMG was utilized for coordination of PFM. Supine, sitting, standing                                               THERAPEUTIC ACTIVITY: ( minutes):     TA Educational Topic Notes Date Completed   Pathology/Anatomy/PFM Function     Bladder health education     Urinary urge suppression     The knack     Voiding strategies     Nocturia tips     Bowel/Bladder log     Bowel health education     Constipation care     Diarrhea/Fecal leakage     Colonic massage     Toilet positioning     Defecation dynamics     Sources of fiber     Return to intercourse/Dilator training     Sexual positioning     Lubricants/vaginal moisturizers     Vaginal irritants     Body mechanics     Posture/ergonomics     Diaphragmatic breathing     Resources and technology          Treatment/Session Summary:    Treatment Assessment: Abdirashid Valentine reports losing bladder function over the past week, inability to control pelvic floor function, and greater difficulty with exercises. She did not lose bladder function during today's session. She would continue to benefit from skilled physical therapy to improve strength and coordination of pelvic floor to assist completion of activities of daily living. Communication/Consultation:  None today  Equipment provided today:  None  Recommendations/Intent for next treatment session: Next visit will focus on coordination of PFM. Total Treatment Billable Duration:  55 minutes there ex  Time In: 1400  Time Out: [facility].  Kendra Delgado, PT       Charge Capture  }Post Session Pain  PT Visit Info  [facility]  MD Guidelines  Scanned Media  Benefits  MyChart    Future Appointments   Date Time Provider Amara Ceron   1/12/2023  3:00 PM MD RAYNE Guerra [facility]   1/16/2023  1:15 PM Toño Wallis, APRN - CNP [id number] [facility]   1/18/2023  2:00 PM [facility]   1/24/2023 2:00 PM [facility]   1/26/2023  4:00 PM Jaci Kirby MD BSNI [facility]

## 2023-01-10 ENCOUNTER — TELEPHONE (OUTPATIENT)
Dept: NEUROLOGY | Age: 53
End: 2023-01-10

## 2023-01-10 ENCOUNTER — HOSPITAL ENCOUNTER (OUTPATIENT)
Dept: PHYSICAL THERAPY | Age: 53
Setting detail: RECURRING SERIES
Discharge: HOME | End: 2023-01-13
Payer: COMMERCIAL

## 2023-01-10 PROCEDURE — 97110 THERAPEUTIC EXERCISES: CPT

## 2023-01-10 NOTE — TELEPHONE ENCOUNTER
Received call from [facility] requesting next appt date and expected RTW w/o restrictions date. Both dates given .  I also faxed the last 3 PT visits to [phone]

## 2023-01-12 ENCOUNTER — OFFICE VISIT (OUTPATIENT)
Dept: ORTHOPEDIC SURGERY | Age: 53
End: 2023-01-12

## 2023-01-12 DIAGNOSIS — M77.11 LATERAL EPICONDYLITIS OF RIGHT ELBOW: Primary | ICD-10-CM

## 2023-01-12 RX ORDER — TRIAMCINOLONE ACETONIDE 40 MG/ML
40 INJECTION, SUSPENSION INTRA-ARTICULAR; INTRAMUSCULAR ONCE
Status: COMPLETED | OUTPATIENT
Start: 2023-01-12 | End: 2023-01-12

## 2023-01-12 RX ADMIN — TRIAMCINOLONE ACETONIDE 40 MG: 40 INJECTION, SUSPENSION INTRA-ARTICULAR; INTRAMUSCULAR at 15:11

## 2023-01-12 NOTE — PROGRESS NOTES
Name: Dom Ha  YOB: 1970  Gender: female  MRN: [id number]  Date of Encounter:  1/12/2023       CHIEF COMPLAINT:     Chief Complaint   Patient presents with    Follow-up     R Right / elbow        SUBJECTIVE/OBJECTIVE:      HPI:    Patient is a 47 y.o. pleasant female who presents today for a return evaluation of her right shoulder and elbow. Working diagnosis:   1. Lateral epicondylitis of right elbow       LOV: 11/29/2022    At her last visit she had reported improvement of the right shoulder pain. She still has some lateral shoulder pain present, but is tolerable. She continues to have significant pain to the lateral epicondyle area. She has been wearing the wrist splint really only at night, but has been wearing her elbow strap. She has been using Voltaren. She reports performing some of the lateral epicondyle exercises we have given her. She also now reports numbness down into her hand that she thinks is her whole hand. She is uncertain if is just specific fingers. She denies any significant neck pain, but does have a history of neuropathic issues. PAST HISTORY:   Past medical, surgical, family, social history and allergies reviewed by me. Unchanged from prior visit. REVIEW OF SYSTEMS:   As noted in HPI. PHYSICAL EXAMINATION:     Gen: Well-developed, no acute distress   HEENT: NC/AT, EOMI   Neck: Trachea midline, normal ROM   CV: Regular rhythm by palpation of distal pulse, normal capillary refill   Pulm: No respiratory distress, no stridor   Psychiatric: Well oriented, normal mood and affect. Skin: No rashes, lesions or ulcers, normal temperature, turgor, and texture on uninvolved extremity. ORTHO EXAM:    Right elbow exam:  No erythema or ecchymosis. No palpable swelling or cysts  No effusion of the elbow or warmth  Flexion 150, extension 0  Tenderness most prominent directly over lateral epicondyle.   No tenderness to radial tunnel, medial epicondyle, olecranon  Resisted wrist extension provocative of pain. Negative Tinel's of cubital tunnel and carpal tunnel. Negative carpal tunnel compression  5 out of 5  strength of the hand. Full flexion and extension of digits. 2+ radial pulses  Reports normal sensation to light touch in the hand forearm    DIAGNOSTIC IMAGING:     I have reviewed prior imaging studies. ASSESSMENT/PLAN:   1. Lateral epicondylitis of right elbow          I advised a corticosteroid injection of the lateral epicondyle today and this was performed in office. I advised that she continue use of her wrist splint more with activity than just at night and continued Voltaren use. She should also continue the stretches and exercises given to her today. I advised we perform a nerve conduction EMG of her right upper extremity to further investigate the numbness she is experiencing, but she declines this test at this time. I advised her if this is getting worse or she starts dropping objects, we need to perform the study. Orders / medications today:   Orders Placed This Encounter   Procedures    ARTHROCENTESIS ASPIR&/INJ MAJOR JT/BURSA W/US      Follow up: Return in about 1 month (around 2/12/2023). The patient expressed understanding and agreed with the plan. Vilma Sanchez MD   [facility]     This document was created using voice recognition software so frequent mistakes are possible. For any concerns about the wording of this document, please contact its creator for further clarification. Right common extensor tendon peritendinous injection, ultrasound-guided (lateral epicondyle injection)    An injection of corticosteroid was discussed today and is indicated for patients elbow pain today. All risks and benefits were discussed and patient wishes to proceed.  Prior to proceeding forward with a steroid injection to the right lateral epicondyle, consent was obtained. Patient was explained the risks of injection, including infection, bleeding, nerve damage, and pain at the site of injection were discussed at length with the patient. Benefits including pain relief were also discussed with the patient. Patient verbalizes understanding of these and consents to procedure. Time-out was conducted with all members of the care team.     The patient was positioned supine with arm partially flexed and pronated. A inferior to superior approach was utilized for this injection procedure. The site of the injection was then cleansed chlorhexidine. Following this a vapo coolant spray was utilized to provide local skin anesthesia. A solution of 40mg Kenalog and 2cc 1% lidocaine was delivered through a 22 gauge, 1.5\" needle at the peritendinous region of the common extensor tendon origin. The site was again cleansed with an alcohol swab. The patient tolerated this procedure well with no adverse events. There was some notable pain relief reported by the patient prior to leaving the office today. The patient was counseled not to submerge the site for 24 hours, not to perform strenuous activity for the next five days, and if notes any signs or symptoms consistent with joint infection or allergic reaction to go to a local emergency room. The patient was observed for 15 minutes postprocedure and was allowed to be discharged from clinic in their usual state of health.

## 2023-01-16 ENCOUNTER — PATIENT MESSAGE (OUTPATIENT)
Dept: UROLOGY | Age: 53
End: 2023-01-16

## 2023-01-16 ENCOUNTER — OFFICE VISIT (OUTPATIENT)
Dept: UROLOGY | Age: 53
End: 2023-01-16
Payer: COMMERCIAL

## 2023-01-16 DIAGNOSIS — N39.41 URGE INCONTINENCE OF URINE: ICD-10-CM

## 2023-01-16 DIAGNOSIS — N32.81 OAB (OVERACTIVE BLADDER): Primary | ICD-10-CM

## 2023-01-16 PROCEDURE — 99214 OFFICE O/P EST MOD 30 MIN: CPT | Performed by: NURSE PRACTITIONER

## 2023-01-16 PROCEDURE — 81003 URINALYSIS AUTO W/O SCOPE: CPT | Performed by: NURSE PRACTITIONER

## 2023-01-16 ASSESSMENT — ENCOUNTER SYMPTOMS
ABDOMINAL PAIN: 0
BACK PAIN: 0

## 2023-01-16 NOTE — PROGRESS NOTES
[facility]  [address]  Ashley Blind [address], [address] [facility]  [phone]          Darlin Roman  : 1970    Chief Complaint   Patient presents with    Follow-up     OAB/Dysuria            HPI     Darlin Roman is a 47 y.o. female referred for ongoing bladder control issues. She had anterior cervic discectomy and fusion for severe cord compression and myelopathy early this year. She initially did well. She then started to have worsening muscle weakness especially in the lower legs. Has had cervical, thoracic, and lumbar MRI. Working w PT to help. Has recently seen neurology for nerve conduction testing. The hope is her weakness will improve with time. No add surgical intervention needed. Since the weakness, she has had bowel and bladder issues. Seen by GI. Had normal colonoscopy. She reports with her bladder, she is having urinary frequency as much as multiple times per hour. Sign UUI. Occ enuresis. No prior h/o bladder troubles. . On BCP. Followed w Dr Roberto Briceño. H/O fibroids. Has regular normal pelvic exams and PAPS. Stopped detrol. Started on myrbetriq 25 mg PO daily and sent to PFT. Due to lack of improvement, we increased to Myrbetriq 50. She is doing great. No longer wearing depends. Dysuria resolved.            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    Conus medullaris syndrome ([address] 2021    Endometriosis determined by laparoscopy     Fibroids     Fibromyalgia     High cholesterol     Pancreatitis     x5- acute attacks; no problems in past 2 years    Thoracic myelopathy 2021    Vitamin B 12 deficiency     B12 injections     Past Surgical History:   Procedure Laterality Date    COLONOSCOPY      COLONOSCOPY N/A 10/20/2022    COLONOSCOPY POLYPECTOMY SNARE/COLD BIOPSY performed by Cheryl Hodges MD at [address],    laparoscopy    HEENT      right ear, stapes replaced    ORTHOPEDIC SURGERY      carpal tunnel release, right    ORTHOPEDIC SURGERY Right     foot surgery     Current Outpatient Medications   Medication Sig Dispense Refill    mirabegron (MYRBETRIQ) 50 MG TB24 Take 50 mg by mouth daily 90 tablet 3    gabapentin (NEURONTIN) 300 MG capsule 1 qhs x 1 week, if tolerate well, increase to 1 bid x 1 week, then 1 tid. 90 capsule 5    norethindrone (ORTHO MICRONOR) 0.35 MG tablet Take 1 tablet by mouth daily 84 tablet 4     No current facility-administered medications for this visit. No Known Allergies  Social History     Socioeconomic History    Marital status: Single     Spouse name: Not on file    Number of children: Not on file    Years of education: Not on file    Highest education level: Not on file   Occupational History    Not on file   Tobacco Use    Smoking status: Never    Smokeless tobacco: Never   Substance and Sexual Activity    Alcohol use: No    Drug use: No    Sexual activity: Yes     Partners: Male     Birth control/protection: Pill   Other Topics Concern    Not on file   Social History Narrative    Not on file     Social Determinants of Health     Financial Resource Strain: Not on file   Food Insecurity: Not on file   Transportation Needs: Not on file   Physical Activity: Not on file   Stress: Not on file   Social Connections: Not on file   Intimate Partner Violence: Not on file   Housing Stability: Not on file     Family History   Problem Relation Age of Onset    Other Neg Hx     Breast Cancer Neg Hx     Post-op Cognitive Dysfunction Neg Hx     Emergence Delirium Neg Hx     Post-op Nausea/Vomiting Neg Hx     Delayed Awakening Neg Hx     Pseudochol.  Deficiency Neg Hx     Malig Hypertherm Neg Hx     Diabetes Maternal Grandmother     Hypertension Maternal Grandmother     Heart Disease Maternal Grandmother Cancer Mother     Hypertension Mother        Review of Systems  Constitutional:   Negative for fever. GI:  Negative for abdominal pain. Musculoskeletal:  Negative for back pain. Urinalysis  UA - Dipstick  Results for orders placed or performed in visit on 01/16/23   AMB POC URINALYSIS DIP STICK AUTO W/O MICRO   Result Value Ref Range    Color (UA POC)      Clarity (UA POC)      Glucose, Urine, POC Negative Negative    Bilirubin, Urine, POC Negative Negative    KETONES, Urine, POC Negative Negative    Specific Gravity, Urine, POC 1.025 1.001 - 1.035    Blood (UA POC) Trace Negative    pH, Urine, POC 5.5 4.6 - 8.0    Protein, Urine, POC Negative Negative    Urobilinogen, POC 1 mg/dL     Nitrite, Urine, POC Negative Negative    Leukocyte Esterase, Urine, POC Negative Negative       UA - Micro  WBC - 0  RBC - 0  Bacteria - 0  Epith - 0    PHYSICAL EXAM    General appearance - well appearing and in no distress  Mental status - alert, oriented to person, place, and time  Neck - supple, no significant adenopathy  Chest/Lung-  Quiet, even and easy respiratory effort without use of accessory muscles  Skin - normal coloration and turgor, no rashes      Assessment and Plan    ICD-10-CM    1. OAB (overactive bladder)  N32.81 AMB POC URINALYSIS DIP STICK AUTO W/O MICRO      2. Urge incontinence of urine  N39.41           OAB/UUI- urine normal. LUTS improved. I do NOT recommend anticholinergics d/t HIGH risk for urinary retention and LONG h/o constipation. Cont Myrbetriq 50 mg PO daily. RTO in 6 months for follow up. Advised to call sooner if sx worsen. Laureen Painting, FNP, APRN - CNP  Dr. Quintin Maldonado is supervising physician today and he approves plan of care.

## 2023-01-18 ENCOUNTER — APPOINTMENT (OUTPATIENT)
Dept: PHYSICAL THERAPY | Age: 53
End: 2023-01-18
Payer: COMMERCIAL

## 2023-01-24 ENCOUNTER — HOSPITAL ENCOUNTER (OUTPATIENT)
Dept: PHYSICAL THERAPY | Age: 53
Setting detail: RECURRING SERIES
Discharge: HOME | End: 2023-01-27
Payer: COMMERCIAL

## 2023-01-24 PROCEDURE — 97110 THERAPEUTIC EXERCISES: CPT

## 2023-01-24 NOTE — PROGRESS NOTES
Chantale Penaloza Cross  : 1970  Primary: Ana Epstein  Secondary:  [address] @ [facility] [address] [address]  Phone: [phone]  Fax: [phone] Plan Frequency: one time a week for 90 days  Plan of Care/Certification Expiration Date: 23      PT Visit Info:  No data recorded   Visit Count:  5   OUTPATIENT PHYSICAL THERAPY:OP NOTE TYPE: Treatment Note 2023       Episode  }Appt Desk             Treatment Diagnosis:  Lack of coordination (muscle incoordination) (R27.8)  Pelvic floor dysfunction in female (M62.98)  Incontinence without sensory awareness (N39.42)   Constipation, unspecified (K59.00)  Medical/Referring Diagnosis:  OAB (overactive bladder) [N32.81]  Urinary incontinence, urge [N39.41]  Constipation, unspecified constipation type [K59.00]  Referring Physician:  KIMBERLEE Potts - CNP  MD Orders:  PT Eval and Treat   Date of Onset:  No data recorded   Allergies:   Patient has no known allergies. Restrictions/Precautions:  No data recorded  No data recorded   Interventions Planned (Treatment may consist of any combination of the following):    Current Treatment Recommendations: Strengthening; ADL/Self-care training; Neuromuscular re-education; Manual; Return to work related activity; Home exercise program; Safety education & training; Patient/Caregiver education & training; Equipment evaluation, education, & procurement; Therapeutic activities     Subjective Comments: Patient reports she is still having issues with incontinence and she has had difficulty with bowel movements the past 2 weeks. She is feeling more constipated and miralax and milk of magnesium is not helping.       Initial:}     /10Post Session:        /10  Medications Last Reviewed:  2023, Myrbetric 50 mg, Northindrone, gabba pentin  Updated Objective Findings:   Ms. Audie Saini is a 48 yo female referred to pelvic floor physical therapy (PFPT) by KIMBERLEE Potts -*  OAB (overactive bladder) [N32.81]  Urinary incontinence, urge [N39.41]  Constipation, unspecified constipation type [K59.00] Pt reports that symptoms began march 2022. For a year she was getting treated for MS because they did CT scan (not MRI) and they found a ruptured disc in her neck which was causing a spinal cord injury. Did a MRI of her back and didn't find anything. She is numb on her whole left side. She then had surgery on her neck. She was good for a few months and then it slowly started getting worse again. Had a nerve conduction study but came back normal. Her right leg is spastic. Both of her legs are weak. Is in PT now for strengthening. She is getting a sensation to her bladder but its too late and she will loose control. She has no control over when she will loose urine. She got a urine test done and found out she had  UTI. The doctor did an ultrasound and was told she is emptying her bladder. She leaks a lot of urine. Urinary: Frequency 3 x/day, 0-1 x/night. Positive for stress urinary incontinence,  incomplete emptying, urinary hesitancy/intermittency  Negative for urge urinary incontinence, urinary urgency, urinary frequency, dysuria, hematuria, nocturia and nocturnal enuresis. Pt does use pads for urinary protection; 3-4 pads per day (PPD). Fluid intake: couple of cups water/day; bladder irritants include: 1 bottle of coke zero. Pt does limit fluid intake due to bladder control. Bowel: Frequency one to two times a week  Positive for pushing/straining with bowel movement, constipation, and incomplete emptying. Negative for pain with bowel movement and fecal incontinence. Pt does not use pads for bowel protection; 0 pads per day (PPD). Use of stool softeners or laxatives? Yes, miralax and milk of magnesia     Sexual: Pt is  sexually active with male partners. Contraception/birth control: yes. negative for dyspareunia.   History of sexual abuse: No     Pelvic Organ Prolapse/Pelvic Pain: Location: none     OB History: Number of pregnancies: 0 Number of vaginal deliveries: 0 Number of c-sections: 0. External Observations:  Voluntary contraction: [] absent     [x] present  Involuntary contraction: [x] absent     [] present  Involuntary relaxation: [x] absent     [] present  Perineal descent at rest: [] absent     [x] present  Perineal descent with bearing: [] absent     [x] present  Gait: antalgic, rigid, wide HUMBLE, slight ataxic      Pelvic Floor Muscle (PFM) Assessment:  Vaginal vault size: [] decreased     [] increased     [x] WNL  Muscle volume: [] decreased     [x] WNL     [] Defect  PFM tension: [] decreased     [x] increased (questionable spasticity?)    [] WNL     Contraction ability:  Voluntary contraction: [] absent     [x] weak     [] moderate      [] strong  Voluntary relaxation: [] absent     [x] partial     [] complete   MMT: 1/5   Quality of contractions: uncoordinated  Overflow: [] absent     [x] min     [] mod     [] severe / Compensatory mm groups include abdominals  Levator Avulsion: [x] Negative  [] Positive     Tissue laxity test:  Anterior wall: [] minimum     [] moderate     [] severe      [x] WNL  Posterior wall: [] minimum     [] moderate     [] severe      [x] WNL        Palpation: via vaginal canal   Superficial Pelvic Floor Musculature (PFM): Tender? Intermediate PFM Tender? Deep PFM Tender?    Superficial Transverse Perineal [] Right  [] Left  []DNT Deep Transverse Perineal [] Right  [] Left  []DNT Puborectalis [] Right  [] Left  []DNT   Ischiocavernosus [] Right  [] Left  []DNT Compressor Urethra [] Right  [] Left  []DNT Pubococcygeus [] Right  [] Left  []DNT   Bulbocavernosus [] Right  [] Left  []DNT     Ileococcygeus [] Right  [] Left  []DNT           Obturator Internus [] Right  [] Left  []DNT           Coccygeus [] Right  [] Left  []DNT      NEURO:  -Clonus negative  -Right lower extremity spasticity noted with knee flexion/extension and dorsi/plantarflexion  -Loss of sensation left lower extremity and left vaginal wall    Treatment   THERAPEUTIC EXERCISE: (15 minutes):    Exercises per grid below to improve mobility, strength, and coordination. Required minimal visual, verbal, manual, and tactile cues to promote proper body alignment, promote proper body posture, promote proper body mechanics, and promote proper body breathing techniques. Progressed resistance, range, repetitions, and complexity of movement as indicated. Date:  12-6-22 Date:  12-19-22 Date:   Date:  1-10-23 Date:  1-24-23   Activity/Exercise Parameters Parameters Parameters     Patient Education Discussed HEP and POC    Reviewed HEP   Squatty potty Reviewed and given handout       Kegel with drop Reviewed and given handout       Seated adduction with ball  X20   X20 X20    Seated abduction with band  X20   X20 X20 blue band    Seated march  X20 B    X20 B  blue band X20 B blue band    2nd position bridge   X20 yellow ball   X20 yellow ball    Sit to stand   X20   X20     Quadruped leg extension   X20       Sidestep   In place     Standing shoulder extension      X20 with yellow t-band    Standing chops      X20 B yellow t-band       All exercises performed with emphasis on kegel and breathing in order improve coordination, awareness and to minimize symptoms.     MANUAL THERAPY: ( minutes): to improve soft tissue tone    Date Type Location Comments   1/24/2023 Internal assessment/treatment                                         (Used abbreviations: MET - muscle energy technique; SCS- Strain counter strain; CTM-Connective tissue mobilizations; CR- Contract/Relax; SP- Sustained pressure; PIT- Positional inhibition techniques; STM Soft -tissue mobilization; MM- Myofascial mobilization; TrP-Trigger point release; IASTM- Instrument assisted soft tissue mobilizations, TDN-Trigger point dry needling)    Pt gives verbal consent to internal vaginal assessment/treatment without chaperon present. NEURO REEDUCATION: (0 minutes) to improve control and coordination of pelvic floor     Date:  12-19-22 Date:   Date:     Activity/Exercise Parameters Parameters Parameters   Biofeedback With sEMG was utilized for coordination of PFM. Supine, sitting, standing                                               THERAPEUTIC ACTIVITY: ( minutes):     TA Educational Topic Notes Date Completed   Pathology/Anatomy/PFM Function     Bladder health education     Urinary urge suppression     The knack     Voiding strategies     Nocturia tips     Bowel/Bladder log     Bowel health education     Constipation care     Diarrhea/Fecal leakage     Colonic massage     Toilet positioning     Defecation dynamics     Sources of fiber     Return to intercourse/Dilator training     Sexual positioning     Lubricants/vaginal moisturizers     Vaginal irritants     Body mechanics     Posture/ergonomics     Diaphragmatic breathing     Resources and technology              Total Treatment Billable Duration:  15 minutes there ex  Time In: 1400  Time Out: 1615 Patricia Cleveland, PT       Charge Capture  }Post Session Pain  PT Visit Info  [employer] Portal  MD Guidelines  Scanned Media  Benefits  MyChart    Future Appointments   Date Time Provider [place]   1/26/2023  4:00 PM Kelby Cevallos MD [facility]   2/14/2023  8:30 AM Kayla Hardy MD [facility]   6/22/2023  9:00 AM Elijah Casarez MD [facility]   7/17/2023 10:00 AM Monserrat Velarde APRN - CNP [id number]

## 2023-01-24 NOTE — THERAPY DISCHARGE
Hue Phillips  : 1970  Primary: Chan Dariel Sc  Secondary:  [address] @ [facility] [address] [address]  Phone: [phone]  Fax: [phone] Plan Frequency: one time a week for 90 days  Plan of Care/Certification Expiration Date: 23      PT Visit Info:         Visit Count:  5                OUTPATIENT PHYSICAL THERAPY:             OP NOTE TYPE: Discharge Summary 2023               Episode  Appt Desk         Treatment Diagnosis:  Lack of coordination (muscle incoordination) (R27.8)  Pelvic floor dysfunction in female (M62.98)  Incontinence without sensory awareness (N39.42)   Constipation, unspecified (K59.00)  Medical/Referring Diagnosis:  OAB (overactive bladder) [N32.81]  Urinary incontinence, urge [N39.41]  Constipation, unspecified constipation type [K59.00]  Referring Physician:  KIMBERLEE Key - CNP  MD Orders:  PT Eval and Treat   Return MD Appt:  Dec 2022  Date of Onset:       Allergies:  Patient has no known allergies. Restrictions/Precautions:           Medications Last Reviewed:  2023     SUBJECTIVE   History of Injury/Illness (Reason for Referral):  Ms. Mary Delgadillo is a 46 yo female referred to pelvic floor physical therapy (PFPT) by KIMBERLEE Key -* 2/2 OAB (overactive bladder) [N32.81]  Urinary incontinence, urge [N39.41]  Constipation, unspecified constipation type [K59.00] Pt reports that symptoms began 2022. For a year she was getting treated for MS because they did CT scan (not MRI) and they found a ruptured disc in her neck which was causing a spinal cord injury. Did a MRI of her back and didn't find anything. She is numb on her whole left side. She then had surgery on her neck. She was good for a few months and then it slowly started getting worse again. Had a nerve conduction study but came back normal. Her right leg is spastic. Both of her legs are weak. Is in PT now for strengthening.  She is getting a sensation to her bladder but its too late and she will loose control. She has no control over when she will loose urine. She got a urine test done and found out she had  UTI. The doctor did an ultrasound and was told she is emptying her bladder. She leaks a lot of urine. Urinary: Frequency every 30 minutes x/day, 3 x/night. Positive for stress urinary incontinence, urge urinary incontinence, urinary urgency, urinary frequency, incomplete emptying, urinary hesitancy/intermittency, and nocturia. Negative for dysuria, hematuria, and nocturnal enuresis. Pt does use pads for urinary protection; 3-4 pads per day (PPD). Fluid intake: couple of cups water/day; bladder irritants include: 1 bottle of coke zero. Pt does limit fluid intake due to bladder control. Bowel: Frequency once every two weeks. Has to stick her finger up her rectum to get the stool out. The stool is soft  Positive for pushing/straining with bowel movement, constipation, and incomplete emptying. Negative for pain with bowel movement and fecal incontinence. Pt does not use pads for bowel protection; 0 pads per day (PPD). Use of stool softeners or laxatives? Yes, miralax and milk of magnesia    Sexual: Pt is  sexually active with male partners. Contraception/birth control: yes. negative for dyspareunia.   History of sexual abuse: No    Pelvic Organ Prolapse/Pelvic Pain: Location: none    OB History: Number of pregnancies: 0 Number of vaginal deliveries: 0 Number of c-sections: 0.                OBJECTIVE   OBSERVATION:   External Observations:  Voluntary contraction: [] absent     [x] present  Involuntary contraction: [x] absent     [] present  Involuntary relaxation: [x] absent     [] present  Perineal descent at rest: [] absent     [x] present  Perineal descent with bearing: [] absent     [x] present  Gait: antalgic, rigid, wide HUMBLE, slight ataxic     Pelvic Floor Muscle (PFM) Assessment:  Vaginal vault size: [] decreased     [] increased     [x] WNL  Muscle volume: [] decreased     [x] WNL     [] Defect  PFM tension: [] decreased     [x] increased (questionable spasticity?)    [] WNL    Contraction ability:  Voluntary contraction: [] absent     [x] weak     [] moderate      [] strong  Voluntary relaxation: [] absent     [x] partial     [] complete   MMT: 1/5   Quality of contractions: uncoordinated  Overflow: [] absent     [x] min     [] mod     [] severe / Compensatory mm groups include abdominals  Levator Avulsion: [x] Negative  [] Positive    Tissue laxity test:  Anterior wall: [] minimum     [] moderate     [] severe      [x] WNL  Posterior wall: [] minimum     [] moderate     [] severe      [x] WNL      Palpation: via vaginal canal   Superficial Pelvic Floor Musculature (PFM): Tender? Intermediate PFM Tender? Deep PFM Tender? Superficial Transverse Perineal [] Right  [] Left  []DNT Deep Transverse Perineal [] Right  [] Left  []DNT Puborectalis [] Right  [] Left  []DNT   Ischiocavernosus [] Right  [] Left  []DNT Compressor Urethra [] Right  [] Left  []DNT Pubococcygeus [] Right  [] Left  []DNT   Bulbocavernosus [] Right  [] Left  []DNT   Ileococcygeus [] Right  [] Left  []DNT       Obturator Internus [] Right  [] Left  []DNT       Coccygeus [] Right  [] Left  []DNT     NEURO:  -Clonus negative  -Right lower extremity spasticity noted with knee flexion/extension and dorsi/plantarflexion  -Loss of sensation left lower extremity and left vaginal wall    ASSESSMENT   DISCHARGE SUMMARY: Ms. Simi Vizcarra has been seen in skilled PT from 12-6-22 to 1-24-23. Patient has attended 5 out of 5 scheduled visits, with 0 cancellation(s) and 0 no shows. Pt has achieved goals as indicated below and all questions have been answered to their satisfaction. Pt was invited to call with any further questions or concerns as needed.    Initial Assessment: Felisha Méndez presents with musculoskeletal limitations including pelvic floor muscle (PFM) tension, reduced PFM Range of Motion (ROM), poor posture, altered body mechanics, reduced coordination and awareness of PFM, and decreased pelvic floor muscle (PFM) strength. These limitations are impairing the patient's ability to properly coordinate perineal elevation and descent for normalized PFM function, contributing to voiding dysfunction. As a result, she is limited in her/his ability to participate in household chores, physical activities such as walking, swimming, or other exercise, entertainment activities such as going to a movie or concert, traveling by car or bus for a distance greater then 30 minutes away from home, and social activities outside of the home. PLAN        Goals: (Goals have been discussed and agreed upon with patient.)  Short-Term Functional Goals: Time Frame: 6 weeks  Pt will demonstrate I with basic PFM HEP to improve awareness, coordination, and timing of PFM. MET  Patient will demonstrate understanding of and ability to teach back appropriate water and fiber intake, toileting frequency, and positioning for improved self-management of symptoms. MET  Patient will demonstrate understanding of and ability to teach back two strategies to reduce constipation and improve toileting to minimize strain on and/or paradoxical movement of the pelvic floor muscles. MET  Patient will demonstrate ability to perform diaphragmatic breathing in multiple positions in order to improve pelvic floor muscle (PFM) lengthening and reduced discomfort and/or straining to aid in bowel evacuation. MET  Patient will demonstrate ability to isolate a pelvic floor contraction without breath holding and minimal to no accessory muscle use in order to implement the knack and/or urge suppression, reducing pad usage by 1.    Patient will demonstrate appropriate use of the pelvic floor muscle group (quick flicks and/or drops), without compensation, to implement urge suppression appropriately with urgency of urination and decrease the number of pads per day or UI episodes by 1. Discharge Goals: Time Frame: 12 weeks  Patient will demonstrate independence with an advanced HEP for general conditioning, core stabilization, and mobility to facilitate carry over and independent management of symptoms. Patient will be independent with implementation of a timed voiding schedule and use of urge suppression to reduce urinary frequency. Pelvic Floor Impact Questionnaire--short form 7 (PFIQ-7):  Score:  Initial:   Bladder or Urine: 76/100  Bowel or Rectum: 28/100  Vagina or Pelvis: 0/100 Most Recent: X (Date: 1-24-23)  Bladder or Urine: 57/100  Bowel or Rectum: 0/100  Vagina or Pelvis: 28/100   Interpretation of Score: Each of the 7 sections is scored on a scale from 0-3; 0 representing \"Not at all\", 3 representing \"Quite a bit\". The mean value is taken from all the answered items, then multiplied by 100 to obtain the scale score, ranging from 0-100. This process is repeated for each column representing bowel, bladder, and pelvic pain. Félix Álvarez. Erin Meza, PT     Referring Physician Signature: KIMBERLEE Crane -* No Signature is Required for this note.         Post Session Pain  Charge Capture  PT Visit Info MD Guidelines  MyChart

## 2023-01-26 ENCOUNTER — OFFICE VISIT (OUTPATIENT)
Dept: NEUROLOGY | Age: 53
End: 2023-01-26
Payer: COMMERCIAL

## 2023-01-26 VITALS
DIASTOLIC BLOOD PRESSURE: 83 MMHG | BODY MASS INDEX: 23.31 KG/M2 | SYSTOLIC BLOOD PRESSURE: 148 MMHG | WEIGHT: 144.4 LBS | OXYGEN SATURATION: 98 % | HEART RATE: 91 BPM

## 2023-01-26 DIAGNOSIS — K59.09 OTHER CONSTIPATION: ICD-10-CM

## 2023-01-26 DIAGNOSIS — R20.2 PARESTHESIA OF BILATERAL LEGS: ICD-10-CM

## 2023-01-26 DIAGNOSIS — G95.9 CERVICAL MYELOPATHY (HCC): ICD-10-CM

## 2023-01-26 DIAGNOSIS — R25.2 SPASTICITY: Primary | ICD-10-CM

## 2023-01-26 DIAGNOSIS — R29.898 WEAKNESS OF BOTH LEGS: ICD-10-CM

## 2023-01-26 PROCEDURE — 99214 OFFICE O/P EST MOD 30 MIN: CPT | Performed by: PSYCHIATRY & NEUROLOGY

## 2023-01-26 RX ORDER — BACLOFEN 10 MG/1
TABLET ORAL
Qty: 180 TABLET | Refills: 5 | Status: SHIPPED | OUTPATIENT
Start: 2023-01-26

## 2023-01-26 RX ORDER — LACTULOSE 10 G/15ML
SOLUTION ORAL
Qty: 237 ML | Refills: 0 | Status: SHIPPED | OUTPATIENT
Start: 2023-01-26

## 2023-01-26 ASSESSMENT — ENCOUNTER SYMPTOMS
PHOTOPHOBIA: 0
BACK PAIN: 0
DOUBLE VISION: 0
CONSTIPATION: 1
BLURRED VISION: 0

## 2023-01-26 ASSESSMENT — VISUAL ACUITY: VA_NORMAL: 1

## 2023-01-26 NOTE — PROGRESS NOTES
1/26/2023  Yue Matthews 47 y.o. female      Chief Complaint:  Chief Complaint   Patient presents with    Follow-up     Leg weakness in both          Followup Note:   Right leg weakness, evaluated by PT and pending ankle brace. Right leg jerking, bothering in driving when right foot on pedal. Feels a little stronger with PT, but without PT weakness returned. Fall about once a week, reduced. Bladder control improved but constipation worse. Left leg and bilateral feet very sensitive to blanket, cold feet. Gabapentin 300 mg tid helped some. Review Test Results: I have reviewed imaging study and lab tests. Brain, TS and LS MRI unremarkable. B12 in November 2021 was 192 --> 437 in September 2022. Current Outpatient Medications   Medication Sig Dispense Refill    lactulose (CHRONULAC) 10 GM/15ML solution 15-30 mL once a day prn for constipation 237 mL 0    baclofen (LIORESAL) 10 MG tablet 1/2 tab tid x 1 week, then 1 tid x 3 weeks,may further gradually increase to 2 tid if spasticity and clonus persist 180 tablet 5    mirabegron (MYRBETRIQ) 50 MG TB24 Take 50 mg by mouth daily 90 tablet 3    gabapentin (NEURONTIN) 300 MG capsule 1 qhs x 1 week, if tolerate well, increase to 1 bid x 1 week, then 1 tid. 90 capsule 5    norethindrone (ORTHO MICRONOR) 0.35 MG tablet Take 1 tablet by mouth daily 84 tablet 4     No current facility-administered medications for this visit. No Known Allergies      Review of Systems:  Review of Systems   HENT:  Positive for hearing loss and tinnitus. Eyes:  Negative for blurred vision, double vision and photophobia. Gastrointestinal:  Positive for constipation (always since spinal cord injury). Musculoskeletal:  Positive for falls (once a week), myalgias (fibromyalgia) and neck pain (from the surgery). Negative for back pain and joint pain.    Neurological:  Positive for tingling (legs), tremors (feet), sensory change (in left leg), seizures (both legs) and weakness. Negative for dizziness, speech change, focal weakness, loss of consciousness and headaches. Psychiatric/Behavioral:  Negative for depression, hallucinations, memory loss, substance abuse and suicidal ideas. The patient is not nervous/anxious and does not have insomnia. All other systems reviewed and are negative. No flowsheet data found. No flowsheet data found. Examination:  Vitals:    01/26/23 1547   BP: (!) 148/83   Pulse: 91   SpO2: 98%   Weight: 144 lb 6.4 oz (65.5 kg)        Physical Exam  Eyes:      Extraocular Movements: EOM normal.      Pupils: Pupils are equal, round, and reactive to light. Neurological:      Mental Status: She is oriented to person, place, and time. Coordination: Finger-Nose-Finger Test normal.      Deep Tendon Reflexes:      Reflex Scores:       Tricep reflexes are 2+ on the right side and 2+ on the left side. Bicep reflexes are 2+ on the right side and 1+ on the left side. Brachioradialis reflexes are 2+ on the right side and 1+ on the left side. Patellar reflexes are 3+ on the right side and 3+ on the left side. Achilles reflexes are 3+ on the right side and 3+ on the left side. Psychiatric:         Speech: Speech normal.        Neurologic Exam     Mental Status   Oriented to person, place, and time. Concentration: normal.   Speech: speech is normal   Level of consciousness: alert  Knowledge: good. Normal comprehension. Cranial Nerves     CN II   Visual fields full to confrontation. Visual acuity: normal  Right visual field deficit: none  Left visual field deficit: none     CN III, IV, VI   Pupils are equal, round, and reactive to light. Extraocular motions are normal.   Right pupil: Size: 3 mm. Shape: regular. Left pupil: Size: 3 mm. Shape: regular. Nystagmus: none   Diplopia: none  Ophthalmoparesis: none  Upgaze: normal  Downgaze: normal    CN VII   Facial expression full, symmetric.      CN VIII   CN VIII normal.     CN IX, X   CN IX normal.   CN X normal.     CN XI   CN XI normal.     CN XII   CN XII normal.     Motor Exam   Muscle bulk: normal  Overall muscle tone: normal  Right arm pronator drift: absent  Left arm pronator drift: absent  Right leg tone: spastic    Strength   Strength 5/5 except as noted. Right iliopsoas: 3/5  Left iliopsoas: 3/5  Right posterior tibial: 4/5  Left posterior tibial: 4/5  Right peroneal: 3/5  Left peroneal: 3/5    Sensory Exam   Right arm light touch: normal  Right leg light touch: normal  Left leg light touch: decreased from knee    Gait, Coordination, and Reflexes     Gait  Gait: spastic    Coordination   Finger to nose coordination: normal    Tremor   Resting tremor: absent  Intention tremor: absent  Action tremor: absent    Reflexes   Right brachioradialis: 2+  Left brachioradialis: 1+  Right biceps: 2+  Left biceps: 1+  Right triceps: 2+  Left triceps: 2+  Right patellar: 3+  Left patellar: 3+  Right achilles: 3+  Left achilles: 3+  Bilateral ankle clonus. Gait dragging right leg spastic, unsteady. Assessment / Plan:    Miguel Mckenzie was seen today for follow-up. Diagnoses and all orders for this visit:    Spasticity  -     baclofen (LIORESAL) 10 MG tablet; 1/2 tab tid x 1 week, then 1 tid x 3 weeks,may further gradually increase to 2 tid if spasticity and clonus persist    Weakness of both legs    Cervical myelopathy (HCC)    Other constipation  -     [facility]  -     lactulose ([facility]) 10 GM/15ML solution; 15-30 mL once a day prn for constipation    Paresthesia of bilateral legs       Cervical myelopathy s/p surgery in January 2022. Bilateral leg weakness continues, falling, spasticity mainly involving the right leg, dragging R leg while walking, bilateral clonus affecting driving. Start baclofen, gradually titrate up to 20 mg 3 times a day.   For bilateral leg paresthesia worse on the left, plan to increase gabapentin to 600 mg 3 times a day after she completed baclofen titration. Fall precaution was given. Bladder function has improved while constipation remains, try lactulose and consultation to GI service for the treatment of constipation. Patient is medically disabled. I have spent 35 min, greater than 50% of discussing and counseling with patient, for treatment and diagnostic plan review.

## 2023-02-08 ENCOUNTER — OFFICE VISIT (OUTPATIENT)
Dept: GASTROENTEROLOGY | Age: 53
End: 2023-02-08
Payer: COMMERCIAL

## 2023-02-08 ENCOUNTER — OFFICE VISIT (OUTPATIENT)
Dept: UROLOGY | Age: 53
End: 2023-02-08

## 2023-02-08 VITALS
SYSTOLIC BLOOD PRESSURE: 130 MMHG | HEART RATE: 93 BPM | BODY MASS INDEX: 23.46 KG/M2 | WEIGHT: 146 LBS | TEMPERATURE: 96.8 F | DIASTOLIC BLOOD PRESSURE: 70 MMHG | HEIGHT: 66 IN | OXYGEN SATURATION: 99 %

## 2023-02-08 DIAGNOSIS — K59.09 CHRONIC CONSTIPATION: Primary | ICD-10-CM

## 2023-02-08 DIAGNOSIS — N30.00 ACUTE CYSTITIS WITHOUT HEMATURIA: Primary | ICD-10-CM

## 2023-02-08 DIAGNOSIS — N30.01 ACUTE CYSTITIS WITH HEMATURIA: ICD-10-CM

## 2023-02-08 LAB
BILIRUBIN, URINE, POC: NEGATIVE
BLOOD URINE, POC: NORMAL
GLUCOSE URINE, POC: NEGATIVE
KETONES, URINE, POC: NEGATIVE
LEUKOCYTE ESTERASE, URINE, POC: NORMAL
NITRITE, URINE, POC: POSITIVE
PH, URINE, POC: 5.5 (ref 4.6–8)
PROTEIN,URINE, POC: NEGATIVE
SPECIFIC GRAVITY, URINE, POC: 1.03 (ref 1–1.03)
URINALYSIS CLARITY, POC: NORMAL
URINALYSIS COLOR, POC: NORMAL
UROBILINOGEN, POC: NORMAL

## 2023-02-08 PROCEDURE — 99214 OFFICE O/P EST MOD 30 MIN: CPT | Performed by: INTERNAL MEDICINE

## 2023-02-08 RX ORDER — NITROFURANTOIN 25; 75 MG/1; MG/1
100 CAPSULE ORAL 2 TIMES DAILY
Qty: 10 CAPSULE | Refills: 0 | Status: SHIPPED | OUTPATIENT
Start: 2023-02-08 | End: 2023-02-13

## 2023-02-08 ASSESSMENT — ENCOUNTER SYMPTOMS
ABDOMINAL DISTENTION: 1
CONSTIPATION: 1

## 2023-02-08 NOTE — PROGRESS NOTES
Marco Antonio Phillips (:  1970) is a 47 y.o. female, established patient here for evaluation of the following chief complaint(s):  Constipation and Follow-up         ASSESSMENT/PLAN:  1. Chronic constipation  -     linaCLOtide (LINZESS) 72 MCG CAPS capsule; Take 1 capsule by mouth every morning (before breakfast), Disp-30 capsule, R-2Normal    We will start Linzess at a dose of 72 mcg and increase the dose as needed. She might need to combine it with MiraLAX if she does not get any relief with Linzess alone. High-fiber diet was also discussed as well as increasing fluid intake. Subjective   SUBJECTIVE/OBJECTIVE  Patient returned back to the office with recurrent episode of constipation . Her last colonoscopy in October showed 2 polyps. She has tried multiple over-the-counter and prescription laxative like lactulose milk of magnesia and MiraLAX with no relief. She has to do digital disimpaction. She had not tried Amitiza or Linzess. Denied any rectal bleeding fever and chills      Review of Systems   Gastrointestinal:  Positive for abdominal distention and constipation. Objective     Physical Exam  HENT:      Head: Normocephalic. Mouth/Throat:      Mouth: Mucous membranes are moist.   Eyes:      General: No scleral icterus. Cardiovascular:      Rate and Rhythm: Normal rate and regular rhythm. Pulmonary:      Effort: No respiratory distress. Abdominal:      General: There is no distension. Tenderness: There is no abdominal tenderness. There is no rebound. Lymphadenopathy:      Cervical: No cervical adenopathy. Skin:     Coloration: Skin is not jaundiced. Findings: No bruising. Neurological:      General: No focal deficit present. Mental Status: She is alert. No follow-ups on file. An electronic signature was used to authenticate this note.     --Justine Shirley MD

## 2023-02-08 NOTE — PROGRESS NOTES
Urine infected. Culture pending. Macrobid sent to pharmacy.     Results for orders placed or performed in visit on 02/08/23   AMB POC URINALYSIS DIP STICK AUTO W/O MICRO   Result Value Ref Range    Color (UA POC)      Clarity (UA POC)      Glucose, Urine, POC Negative Negative    Bilirubin, Urine, POC Negative Negative    KETONES, Urine, POC Negative Negative    Specific Gravity, Urine, POC 1.030 1.001 - 1.035    Blood (UA POC) Moderate Negative    pH, Urine, POC 5.5 4.6 - 8.0    Protein, Urine, POC Negative Negative    Urobilinogen, POC 0.2 mg/dL     Nitrite, Urine, POC Positive Negative    Leukocyte Esterase, Urine, POC Trace Negative         Ripley Seda, FNP, APRN - CNP

## 2023-02-10 LAB
BACTERIA SPEC CULT: ABNORMAL
BACTERIA SPEC CULT: ABNORMAL
SERVICE CMNT-IMP: ABNORMAL

## 2023-02-14 ENCOUNTER — OFFICE VISIT (OUTPATIENT)
Dept: ORTHOPEDIC SURGERY | Age: 53
End: 2023-02-14

## 2023-02-14 DIAGNOSIS — M77.11 LATERAL EPICONDYLITIS OF RIGHT ELBOW: Primary | ICD-10-CM

## 2023-02-14 NOTE — PROGRESS NOTES
Name: Sherice Arredondo  YOB: 1970  Gender: female  MRN: [id number]  Date of Encounter:  2/14/2023       CHIEF COMPLAINT:     Chief Complaint   Patient presents with    Follow-up     Right shoulder/elbow          SUBJECTIVE/OBJECTIVE:      HPI:    Patient is a 47 y.o. pleasant female who presents today for a return evaluation of her right elbow and shoulder. Working diagnosis:   1. Lateral epicondylitis of right elbow       LOV: 1/12/23    Last visit we performed CSI to the right common extensor tendon sheath. She was very sore initially after injection, but has seen significant improvement in her elbow and shoulder pain. She has occasional pain in the shoulder with sleep, and occasional soreness in the elbow, but overall pain has much improved. She has full range of motion. She did not have the nerve conduction study done because of her considerable pain relief and resolution of issues in the arm. PAST HISTORY:   Past medical, surgical, family, social history and allergies reviewed by me. Unchanged from prior visit. REVIEW OF SYSTEMS:   As noted in HPI. PHYSICAL EXAMINATION:     Gen: Well-developed, no acute distress   HEENT: NC/AT, EOMI   Neck: Trachea midline, normal ROM   CV: Regular rhythm by palpation of distal pulse, normal capillary refill   Pulm: No respiratory distress, no stridor   Psychiatric: Well oriented, normal mood and affect. Skin: No rashes, lesions or ulcers, normal temperature, turgor, and texture on uninvolved extremity. ORTHO EXAM:    R elbow:     No edema or effusion  Mild tenderness to lateral epicondyle. Full flexion, extension, supination and pronation of elbow. SILT in arm  2+ radial pulse    R shoulder:   No tenderness  Full FF, abduction, internal and external rotation  Mild pain with empty can  5/5 abduction, external and internal rotation    DIAGNOSTIC IMAGING:     I have reviewed prior imaging studies. ASSESSMENT/PLAN:   1. Lateral epicondylitis of right elbow       Significant improvement s/p injection. I advised her to continue her home exercises and stretching of both the shoulder and elbow, to use her wrist splint if pain in the elbow increases, Voltaren, and to contact us if she has worsening pain or further issues. She is agreeable to this treatment plan and agreeable to follow-up only as needed at this time. Orders / medications today: No orders of the defined types were placed in this encounter. Follow up: Return if symptoms worsen or fail to improve. The patient expressed understanding and agreed with the plan. Ryan Katz MD   [facility]     This document was created using voice recognition software so frequent mistakes are possible. For any concerns about the wording of this document, please contact its creator for further clarification.

## 2023-02-23 RX ORDER — SULFAMETHOXAZOLE AND TRIMETHOPRIM 800; 160 MG/1; MG/1
1 TABLET ORAL 2 TIMES DAILY
Qty: 14 TABLET | Refills: 0 | Status: SHIPPED | OUTPATIENT
Start: 2023-02-23 | End: 2023-03-02

## 2023-03-06 ENCOUNTER — CLINICAL DOCUMENTATION (OUTPATIENT)
Dept: NEUROLOGY | Age: 53
End: 2023-03-06

## 2023-03-14 ENCOUNTER — TELEPHONE (OUTPATIENT)
Dept: GASTROENTEROLOGY | Age: 53
End: 2023-03-14

## 2023-03-14 DIAGNOSIS — R15.0 INCOMPLETE DEFECATION: ICD-10-CM

## 2023-03-14 DIAGNOSIS — K59.9 BOWEL DYSFUNCTION: Primary | ICD-10-CM

## 2023-03-14 NOTE — TELEPHONE ENCOUNTER
Called patient and left message to call the office  per Dr. Solomon Dair two capfuls twice a day till she has bowel movement.    Tell her she need rectal manometry to study her rectal muscle;   Call dr Robin oDll / urogynecologist and check if she is doing rectal manometry; if yes; send referral

## 2023-05-11 ENCOUNTER — OFFICE VISIT (OUTPATIENT)
Dept: UROGYNECOLOGY | Age: 53
End: 2023-05-11
Payer: COMMERCIAL

## 2023-05-11 VITALS
HEIGHT: 66 IN | DIASTOLIC BLOOD PRESSURE: 74 MMHG | SYSTOLIC BLOOD PRESSURE: 110 MMHG | BODY MASS INDEX: 24.68 KG/M2 | WEIGHT: 153.6 LBS

## 2023-05-11 DIAGNOSIS — K59.09 CHRONIC CONSTIPATION: Primary | ICD-10-CM

## 2023-05-11 DIAGNOSIS — R32 URINARY INCONTINENCE, UNSPECIFIED TYPE: ICD-10-CM

## 2023-05-11 LAB
BILIRUBIN, URINE, POC: NEGATIVE
BLOOD URINE, POC: NEGATIVE
GLUCOSE URINE, POC: NEGATIVE
KETONES, URINE, POC: NEGATIVE
LEUKOCYTE ESTERASE, URINE, POC: NORMAL
NITRITE, URINE, POC: POSITIVE
PH, URINE, POC: 5.5 (ref 4.6–8)
PROTEIN,URINE, POC: NEGATIVE
SPECIFIC GRAVITY, URINE, POC: 1.02 (ref 1–1.03)
URINALYSIS CLARITY, POC: CLEAR
URINALYSIS COLOR, POC: YELLOW
UROBILINOGEN, POC: NORMAL

## 2023-05-11 PROCEDURE — 81003 URINALYSIS AUTO W/O SCOPE: CPT | Performed by: OBSTETRICS & GYNECOLOGY

## 2023-05-11 PROCEDURE — 51701 INSERT BLADDER CATHETER: CPT | Performed by: OBSTETRICS & GYNECOLOGY

## 2023-05-11 PROCEDURE — 99204 OFFICE O/P NEW MOD 45 MIN: CPT | Performed by: OBSTETRICS & GYNECOLOGY

## 2023-05-11 RX ORDER — M-VIT,TX,IRON,MINS/CALC/FOLIC 27MG-0.4MG
1 TABLET ORAL DAILY
COMMUNITY

## 2023-05-11 RX ORDER — NITROFURANTOIN 25; 75 MG/1; MG/1
100 CAPSULE ORAL 2 TIMES DAILY
Qty: 10 CAPSULE | Refills: 0 | Status: SHIPPED | OUTPATIENT
Start: 2023-05-11 | End: 2023-05-16

## 2023-05-11 NOTE — ASSESSMENT & PLAN NOTE
The initial treatment of constipation is assuring that there is enough fiber in the diet. A high fiber diet with plenty of fluids (up to 8 glasses of water daily) is suggested to relieve these symptoms. Citrucel, Metamucil, bene fiber, Fibercon etc, are great supplements. The goal is to slowly work up to 25-30g of fiber daily. Osmotic laxatives can be added. I have recommended Miralx 17g daily to prevent straining with BM. Stimulant laxatives such as glycerol suppositories or bisacodyl can help as well. Physical therapy can also help achieve adequate bowel movements. Physical therapy with biofeedback has been shown to improve symptoms up to 70%. She is s/p PT    If conservative measures do not work, we can consider ARM with Colonoic transit study or defecography as needed. We will set up [facility].

## 2023-05-11 NOTE — PROGRESS NOTES
Status: She is alert and oriented to person, place, and time. Psychiatric:         Mood and Affect: Mood normal.         Behavior: Behavior normal.         Thought Content: Thought content normal.         Judgment: Judgment normal.        Female Genitourinary   Vulva:    Normal. No lesions  Bartholin's Gland:  Bilateral , Normal, nontender  Skenes Gland:  Bilateral, Normal, nontender   Clitoris:  Normal.   Introitus:    Normal.   Urethral Meatus:  Normal appearing, normal size, no lesions, no prolapse  Urethra:  No masses, no tenderness  Vagina:  No atrophy, no discharge, no lesions  Cervix:  No lesions, no discharge  Uterus:  No tenderness, normal mobility   Adnexa:   No masses palpated, no tenderness  Bladder:  No tenderness, no masses palpated  Perineum:  Normal, no lesions    Rectal   Anorectal Exam: No hemorrhoids and no masses or lesions of the perineum      POP-Q: (Pelvic Organ Prolapse - Quantification Exam):  No flowsheet data found. Pelvic floor muscles: Tender Spasm     R. Puborectalis: NO 0 /5    L. Puborectalis: NO 0 /5    R. Pubococcyg NO 0 /5    L. Pubococcyg NO 0 /5    R. Ileococcyg: NO 0 /5    L. Ileococcyg: NO 0 /5    R. Obturator Int: NO 0 /5    L. Obturator Int: NO 0 /5    R. Coccygeus: NO 0 /5    L. Coccygeus: NO 0 /5      Pelvic floor contractions: 1/5    Stress Test of HEAVEN: neg    Post Void Residual collected by straight catheterization: 110cc  The patient was in lithotomy position and the urethra was cleansed to maintain sterility. A 14Fr catheter was used to drain the bladder in sterile fashion. 1. Chronic constipation  Assessment & Plan:  The initial treatment of constipation is assuring that there is enough fiber in the diet. A high fiber diet with plenty of fluids (up to 8 glasses of water daily) is suggested to relieve these symptoms. Citrucel, Metamucil, bene fiber, Fibercon etc, are great supplements. The goal is to slowly work up to 25-30g of fiber daily.  Osmotic laxatives

## 2023-05-11 NOTE — ASSESSMENT & PLAN NOTE
She also has UUI. I believe she is having neurogenic bladder. She is on Myrbetriq and s/p PT. We discussed trying another medication v 3rd line therapy. She has watched the videos on SNM v Botox. We are going to treat/ eval one thing at a time starting with constipation. We will then look into UDS or cystoscopy if necessary.

## 2023-05-14 LAB
BACTERIA SPEC CULT: ABNORMAL
SERVICE CMNT-IMP: ABNORMAL

## 2023-05-19 ENCOUNTER — OFFICE VISIT (OUTPATIENT)
Dept: UROGYNECOLOGY | Age: 53
End: 2023-05-19

## 2023-05-19 DIAGNOSIS — K59.09 CHRONIC CONSTIPATION: Primary | ICD-10-CM

## 2023-05-19 DIAGNOSIS — R15.9 FULL INCONTINENCE OF FECES: ICD-10-CM

## 2023-05-19 NOTE — PROGRESS NOTES
AnoRectal Manometry    Diagnosis: Fecal Incontinence    5/19/2023    Informed Consent: The nature of ARM testing and its purpose were discussed with the patient. Alternative testing (or no testing) was reviewed. Risks include, but are not limited to infection, trauma and bleeding, injury and perforation, and the need for further testing based on the results. No guarantees of success or outcome were given or implied. The patient stated there were no further questions and agreed to proceed. Verbal consent was obtained. Anesthesia: None    Anorectal Sphincter Function:      The patient underwent a ASF which demonstrated:     Highest Peak Rest Pressure: 63.7 mmHg (Normal > 40 mmHg)   Highest Peak Squeeze Pressure: 79.3 mmHg (Normal >100 mmHg)   Highest Peak Squeeze Increase Pressure: 21.3 mmHg (Normal 45-60 mmHg)   Resting Anal Canal Length: 4 cm  Squeeze Anal Canal Length: 4 cm (Normal male 4-5 cm, female 3-4 cm)    Rectoanal Reflex Activity:     RAIR is present at 50cc, possibly small RAIR at 30cc (Normal 10-30cc)   Excitation     Linear Change  0.00%    Latnecy   0 sec   Inhibition    Linear Change  67.00%    Latency   7 sec   Restore   -38%    Rectal Sensations:   First Sensation:  50ml (Normal 10-60 ml)   Urge:    ml   Never reached urge (Normal 10-100ml)   Max Tolerance:  ml  Never reached max tolerance (Normal 200-300ml)   Compliance:  ml/mmHg     Expulsion Summary:   Volume: 70ml (Normal 50ml)   Result: Expelled    Complications: None    Impression:   Decreased sensations, and weak external anal sphincter. otherwise normal testing    1. Chronic constipation  -     MO EMG STDS ANAL/URTL SPHNCTR OTH/THN NDL  -     MO ANORECTAL MANOMETRY  2. Full incontinence of feces  -     MO EMG STDS ANAL/URTL SPHNCTR OTH/THN NDL  -     MO ANORECTAL MANOMETRY       No follow-up provider specified.     Shaq Trent DO

## 2023-05-24 ENCOUNTER — OFFICE VISIT (OUTPATIENT)
Dept: UROLOGY | Age: 53
End: 2023-05-24
Payer: COMMERCIAL

## 2023-05-24 DIAGNOSIS — N30.00 ACUTE CYSTITIS WITHOUT HEMATURIA: Primary | ICD-10-CM

## 2023-05-24 LAB
BILIRUBIN, URINE, POC: NEGATIVE
BLOOD URINE, POC: NORMAL
GLUCOSE URINE, POC: NEGATIVE
KETONES, URINE, POC: NEGATIVE
LEUKOCYTE ESTERASE, URINE, POC: NORMAL
NITRITE, URINE, POC: POSITIVE
PH, URINE, POC: 5.5 (ref 4.6–8)
PROTEIN,URINE, POC: NEGATIVE
SPECIFIC GRAVITY, URINE, POC: 1.02 (ref 1–1.03)
URINALYSIS CLARITY, POC: NORMAL
URINALYSIS COLOR, POC: NORMAL
UROBILINOGEN, POC: NORMAL

## 2023-05-24 PROCEDURE — 81003 URINALYSIS AUTO W/O SCOPE: CPT | Performed by: NURSE PRACTITIONER

## 2023-05-24 RX ORDER — NITROFURANTOIN 25; 75 MG/1; MG/1
100 CAPSULE ORAL 2 TIMES DAILY
Qty: 14 CAPSULE | Refills: 0 | Status: SHIPPED | OUTPATIENT
Start: 2023-05-24 | End: 2023-05-31

## 2023-05-24 NOTE — PROGRESS NOTES
Urine infected. Culture pending. Macrobid sent. Need to start suppression therapy after. Await culture results. Keep OV as arranged.     Results for orders placed or performed in visit on 05/24/23   AMB POC URINALYSIS DIP STICK AUTO W/O MICRO   Result Value Ref Range    Color (UA POC)      Clarity (UA POC)      Glucose, Urine, POC Negative Negative    Bilirubin, Urine, POC Negative Negative    KETONES, Urine, POC Negative Negative    Specific Gravity, Urine, POC 1.020 1.001 - 1.035    Blood (UA POC) Moderate Negative    pH, Urine, POC 5.5 4.6 - 8.0    Protein, Urine, POC Negative Negative    Urobilinogen, POC 1 mg/dL     Nitrite, Urine, POC Positive Negative    Leukocyte Esterase, Urine, POC Trace Negative         KIMBERLEE Jim - CNP

## 2023-05-25 DIAGNOSIS — N32.81 OAB (OVERACTIVE BLADDER): Primary | ICD-10-CM

## 2023-05-25 DIAGNOSIS — N39.41 URGE INCONTINENCE OF URINE: ICD-10-CM

## 2023-05-26 LAB
BACTERIA SPEC CULT: ABNORMAL
BACTERIA SPEC CULT: ABNORMAL
SERVICE CMNT-IMP: ABNORMAL

## 2023-05-31 ENCOUNTER — OFFICE VISIT (OUTPATIENT)
Dept: UROGYNECOLOGY | Age: 53
End: 2023-05-31
Payer: COMMERCIAL

## 2023-05-31 DIAGNOSIS — K59.09 CHRONIC CONSTIPATION: ICD-10-CM

## 2023-05-31 DIAGNOSIS — N95.2 VAGINAL ATROPHY: Primary | ICD-10-CM

## 2023-05-31 PROCEDURE — 99214 OFFICE O/P EST MOD 30 MIN: CPT | Performed by: OBSTETRICS & GYNECOLOGY

## 2023-05-31 RX ORDER — CONJUGATED ESTROGENS 0.62 MG/G
0.5 CREAM VAGINAL
Qty: 30 G | Refills: 5 | Status: SHIPPED
Start: 2023-05-31 | End: 2023-06-01

## 2023-05-31 NOTE — PROGRESS NOTES
[facility]  [address]  Dept: [phone]    PCP:  Erik Peterson MD    5/31/2023      HPI:  Jose R Billings is here to follow up on [address] (ARM follow up)  . She is here to discuss ARM results. She has been taking benfiber, fiber pills and activia daily. These medications mostly give her gas, her Bms have increased from once a week to twice a week if she's katlin. She had another UTI since her last apt and her urologist put her on a daily antibiotic (macrobid). She is using fiber and she feels gas, but not changing the consistence of BM. She is doing her HEP and is s/p PT with Orlando Landeros. She is manually extracting her Bms at least once per week. Once per week she is able to have a BM on her own. No results found for this visit on 05/31/23. There were no vitals taken for this visit. 1. Vaginal atrophy  Assessment & Plan:  We discussed starting vaginal estrogen cream for atrophy. I described the benefits to vaginal health. We briefly discussed well publicized literature on risks of hormone replacement therapy. I described the low systemic absorption of vaginal estrogen. She will start vaginal estrogen. Please use 0.5g in the vagina 2-3 nights per week. Coupon and sample given. 2. Chronic constipation  Assessment & Plan:  S/p miralax, linzess, fiber suppliments. ARM was mostly normal with decreased sensation with balloon fill. Normal reflex. I discussed SNM with her v speaking to colorectal surgery. She is going to speak with colorectal surgery and see what other options she has. Referral placed. Orders:  -     External Referral To Colorectal Surgery     Return in about 3 months (around 8/31/2023).     On this date 5/31/2023 I have spent 30 minutes reviewing previous notes, test results and face to face with the patient discussing the diagnosis and importance of compliance with the treatment

## 2023-05-31 NOTE — ASSESSMENT & PLAN NOTE
We discussed starting vaginal estrogen cream for atrophy. I described the benefits to vaginal health. We briefly discussed well publicized literature on risks of hormone replacement therapy. I described the low systemic absorption of vaginal estrogen. She will start vaginal estrogen. Please use 0.5g in the vagina 2-3 nights per week. Coupon and sample given.

## 2023-05-31 NOTE — ASSESSMENT & PLAN NOTE
S/p miralax, linzess, fiber suppliments. ARM was mostly normal with decreased sensation with balloon fill. Normal reflex. I discussed SNM with her v speaking to colorectal surgery. She is going to speak with colorectal surgery and see what other options she has. Referral placed.

## 2023-06-01 RX ORDER — ESTRADIOL 0.1 MG/G
1 CREAM VAGINAL DAILY
Qty: 42.5 G | Refills: 3 | Status: SHIPPED | OUTPATIENT
Start: 2023-06-01

## 2023-06-19 SDOH — ECONOMIC STABILITY: FOOD INSECURITY: WITHIN THE PAST 12 MONTHS, THE FOOD YOU BOUGHT JUST DIDN'T LAST AND YOU DIDN'T HAVE MONEY TO GET MORE.: NEVER TRUE

## 2023-06-19 SDOH — ECONOMIC STABILITY: FOOD INSECURITY: WITHIN THE PAST 12 MONTHS, YOU WORRIED THAT YOUR FOOD WOULD RUN OUT BEFORE YOU GOT MONEY TO BUY MORE.: NEVER TRUE

## 2023-06-19 SDOH — ECONOMIC STABILITY: INCOME INSECURITY: HOW HARD IS IT FOR YOU TO PAY FOR THE VERY BASICS LIKE FOOD, HOUSING, MEDICAL CARE, AND HEATING?: NOT VERY HARD

## 2023-06-19 SDOH — ECONOMIC STABILITY: TRANSPORTATION INSECURITY
IN THE PAST 12 MONTHS, HAS LACK OF TRANSPORTATION KEPT YOU FROM MEETINGS, WORK, OR FROM GETTING THINGS NEEDED FOR DAILY LIVING?: NO

## 2023-06-19 SDOH — ECONOMIC STABILITY: HOUSING INSECURITY
IN THE LAST 12 MONTHS, WAS THERE A TIME WHEN YOU DID NOT HAVE A STEADY PLACE TO SLEEP OR SLEPT IN A SHELTER (INCLUDING NOW)?: NO

## 2023-06-25 ENCOUNTER — PATIENT MESSAGE (OUTPATIENT)
Dept: NEUROLOGY | Age: 53
End: 2023-06-25

## 2023-06-25 DIAGNOSIS — R29.898 RIGHT LEG WEAKNESS: ICD-10-CM

## 2023-06-25 DIAGNOSIS — G95.9 CERVICAL MYELOPATHY (HCC): Primary | ICD-10-CM

## 2023-06-25 DIAGNOSIS — M54.41 RIGHT-SIDED LOW BACK PAIN WITH RIGHT-SIDED SCIATICA, UNSPECIFIED CHRONICITY: ICD-10-CM

## 2023-07-10 ENCOUNTER — TRANSCRIBE ORDERS (OUTPATIENT)
Dept: SCHEDULING | Age: 53
End: 2023-07-10

## 2023-07-10 DIAGNOSIS — Z12.31 VISIT FOR SCREENING MAMMOGRAM: Primary | ICD-10-CM

## 2023-07-21 SDOH — ECONOMIC STABILITY: INCOME INSECURITY: HOW HARD IS IT FOR YOU TO PAY FOR THE VERY BASICS LIKE FOOD, HOUSING, MEDICAL CARE, AND HEATING?: NOT VERY HARD

## 2023-07-21 SDOH — ECONOMIC STABILITY: FOOD INSECURITY: WITHIN THE PAST 12 MONTHS, THE FOOD YOU BOUGHT JUST DIDN'T LAST AND YOU DIDN'T HAVE MONEY TO GET MORE.: NEVER TRUE

## 2023-07-21 SDOH — ECONOMIC STABILITY: FOOD INSECURITY: WITHIN THE PAST 12 MONTHS, YOU WORRIED THAT YOUR FOOD WOULD RUN OUT BEFORE YOU GOT MONEY TO BUY MORE.: NEVER TRUE

## 2023-07-24 ENCOUNTER — OFFICE VISIT (OUTPATIENT)
Dept: ORTHOPEDIC SURGERY | Age: 53
End: 2023-07-24

## 2023-07-24 DIAGNOSIS — M25.551 GREATER TROCHANTERIC PAIN SYNDROME OF RIGHT LOWER EXTREMITY: ICD-10-CM

## 2023-07-24 DIAGNOSIS — M62.89 HAMSTRING TIGHTNESS OF RIGHT LOWER EXTREMITY: Primary | ICD-10-CM

## 2023-07-24 RX ORDER — METHYLPREDNISOLONE ACETATE 40 MG/ML
40 INJECTION, SUSPENSION INTRA-ARTICULAR; INTRALESIONAL; INTRAMUSCULAR; SOFT TISSUE ONCE
Status: COMPLETED | OUTPATIENT
Start: 2023-07-24 | End: 2023-07-24

## 2023-07-24 RX ADMIN — METHYLPREDNISOLONE ACETATE 40 MG: 40 INJECTION, SUSPENSION INTRA-ARTICULAR; INTRALESIONAL; INTRAMUSCULAR; SOFT TISSUE at 10:00

## 2023-07-25 ENCOUNTER — OFFICE VISIT (OUTPATIENT)
Dept: UROLOGY | Age: 53
End: 2023-07-25
Payer: COMMERCIAL

## 2023-07-25 ENCOUNTER — OFFICE VISIT (OUTPATIENT)
Dept: OBGYN CLINIC | Age: 53
End: 2023-07-25
Payer: COMMERCIAL

## 2023-07-25 VITALS
SYSTOLIC BLOOD PRESSURE: 120 MMHG | WEIGHT: 159 LBS | DIASTOLIC BLOOD PRESSURE: 78 MMHG | BODY MASS INDEX: 25.55 KG/M2 | HEIGHT: 66 IN

## 2023-07-25 DIAGNOSIS — Z30.09 ENCOUNTER FOR COUNSELING REGARDING CONTRACEPTION: ICD-10-CM

## 2023-07-25 DIAGNOSIS — Z12.4 SCREENING FOR CERVICAL CANCER: ICD-10-CM

## 2023-07-25 DIAGNOSIS — N39.0 RECURRENT UTI: ICD-10-CM

## 2023-07-25 DIAGNOSIS — D21.9 FIBROIDS: ICD-10-CM

## 2023-07-25 DIAGNOSIS — Z12.31 ENCOUNTER FOR SCREENING MAMMOGRAM FOR MALIGNANT NEOPLASM OF BREAST: ICD-10-CM

## 2023-07-25 DIAGNOSIS — N92.1 MENORRHAGIA WITH IRREGULAR CYCLE: ICD-10-CM

## 2023-07-25 DIAGNOSIS — N80.9 ENDOMETRIOSIS DETERMINED BY LAPAROSCOPY: ICD-10-CM

## 2023-07-25 DIAGNOSIS — N39.41 URGE INCONTINENCE OF URINE: ICD-10-CM

## 2023-07-25 DIAGNOSIS — N80.9 ENDOMETRIOSIS: ICD-10-CM

## 2023-07-25 DIAGNOSIS — Z11.51 SCREENING FOR HPV (HUMAN PAPILLOMAVIRUS): ICD-10-CM

## 2023-07-25 DIAGNOSIS — Z01.419 WELL WOMAN EXAM WITH ROUTINE GYNECOLOGICAL EXAM: Primary | ICD-10-CM

## 2023-07-25 DIAGNOSIS — Z30.09 EMERGENCY CONTRACEPTIVE COUNSELING: ICD-10-CM

## 2023-07-25 DIAGNOSIS — N32.81 OAB (OVERACTIVE BLADDER): Primary | ICD-10-CM

## 2023-07-25 DIAGNOSIS — R92.2 DENSE BREAST TISSUE: ICD-10-CM

## 2023-07-25 LAB
BILIRUBIN, URINE, POC: NEGATIVE
BLOOD URINE, POC: NORMAL
ERYTHROCYTE [DISTWIDTH] IN BLOOD BY AUTOMATED COUNT: 11.9 % (ref 11.9–14.6)
GLUCOSE URINE, POC: NEGATIVE
HCG, PREGNANCY, URINE, POC: NEGATIVE
HCT VFR BLD AUTO: 42 % (ref 35.8–46.3)
HGB BLD-MCNC: 14.3 G/DL (ref 11.7–15.4)
KETONES, URINE, POC: NEGATIVE
LEUKOCYTE ESTERASE, URINE, POC: NEGATIVE
MCH RBC QN AUTO: 31.4 PG (ref 26.1–32.9)
MCHC RBC AUTO-ENTMCNC: 34 G/DL (ref 31.4–35)
MCV RBC AUTO: 92.1 FL (ref 82–102)
NITRITE, URINE, POC: NEGATIVE
NRBC # BLD: 0 K/UL (ref 0–0.2)
PH, URINE, POC: 5.5 (ref 4.6–8)
PLATELET # BLD AUTO: 285 K/UL (ref 150–450)
PMV BLD AUTO: 10.3 FL (ref 9.4–12.3)
PROTEIN,URINE, POC: NEGATIVE
RBC # BLD AUTO: 4.56 M/UL (ref 4.05–5.2)
SPECIFIC GRAVITY, URINE, POC: 1.03 (ref 1–1.03)
T4 FREE SERPL-MCNC: 1 NG/DL (ref 0.78–1.46)
TSH W FREE THYROID IF ABNORMAL: 0.56 UIU/ML (ref 0.36–3.74)
URINALYSIS CLARITY, POC: NORMAL
URINALYSIS COLOR, POC: NORMAL
UROBILINOGEN, POC: NORMAL
VALID INTERNAL CONTROL, POC: YES
WBC # BLD AUTO: 9.8 K/UL (ref 4.3–11.1)

## 2023-07-25 PROCEDURE — 99214 OFFICE O/P EST MOD 30 MIN: CPT | Performed by: OBSTETRICS & GYNECOLOGY

## 2023-07-25 PROCEDURE — 99396 PREV VISIT EST AGE 40-64: CPT | Performed by: OBSTETRICS & GYNECOLOGY

## 2023-07-25 PROCEDURE — 81003 URINALYSIS AUTO W/O SCOPE: CPT | Performed by: NURSE PRACTITIONER

## 2023-07-25 PROCEDURE — 99214 OFFICE O/P EST MOD 30 MIN: CPT | Performed by: NURSE PRACTITIONER

## 2023-07-25 PROCEDURE — 81025 URINE PREGNANCY TEST: CPT | Performed by: OBSTETRICS & GYNECOLOGY

## 2023-07-25 RX ORDER — VIBEGRON 75 MG/1
75 TABLET, FILM COATED ORAL DAILY
Qty: 30 TABLET | Refills: 0 | COMMUNITY
Start: 2023-07-25

## 2023-07-25 ASSESSMENT — ENCOUNTER SYMPTOMS
BACK PAIN: 0
NAUSEA: 0

## 2023-07-25 NOTE — PROGRESS NOTES
Social Connections: Not on file   Intimate Partner Violence: Not on file   Housing Stability: Not on file     Family History   Problem Relation Age of Onset    Other Neg Hx     Breast Cancer Neg Hx     Post-op Cognitive Dysfunction Neg Hx     Emergence Delirium Neg Hx     Post-op Nausea/Vomiting Neg Hx     Delayed Awakening Neg Hx     Pseudochol. Deficiency Neg Hx     Malig Hypertherm Neg Hx     Diabetes Maternal Grandmother     Hypertension Maternal Grandmother     Heart Disease Maternal Grandmother     Cancer Mother     Hypertension Mother        Review of Systems  Constitutional:   Negative for fever. GI:  Negative for nausea. Genitourinary: Positive for straining, urgency, leakage w/ urge and frequent urination. Musculoskeletal:  Negative for back pain.     Urinalysis  UA - Dipstick  Results for orders placed or performed in visit on 07/25/23   AMB POC URINALYSIS DIP STICK AUTO W/O MICRO   Result Value Ref Range    Color (UA POC)      Clarity (UA POC)      Glucose, Urine, POC Negative Negative    Bilirubin, Urine, POC Negative Negative    KETONES, Urine, POC Negative Negative    Specific Gravity, Urine, POC 1.030 1.001 - 1.035    Blood (UA POC) Small Negative    pH, Urine, POC 5.5 4.6 - 8.0    Protein, Urine, POC Negative Negative    Urobilinogen, POC 0.2 mg/dL     Nitrite, Urine, POC Negative Negative    Leukocyte Esterase, Urine, POC Negative Negative   Results for orders placed or performed in visit on 05/11/23   AMB POC URINALYSIS DIP STICK AUTO W/O MICRO   Result Value Ref Range    Color, Urine, POC Yellow     Clarity, Urine, POC Clear     Glucose, Urine, POC Negative Negative    Bilirubin, Urine, POC Negative Negative    Ketones, Urine, POC Negative Negative    Specific Gravity, Urine, POC 1.025 1.001 - 1.035    Blood, Urine, POC Negative Negative    pH, Urine, POC 5.5 4.6 - 8.0    Protein, Urine, POC Negative Negative    Urobilinogen, POC Normal     Nitrite, Urine, POC Positive Negative    Leukocyte

## 2023-07-26 NOTE — RESULT ENCOUNTER NOTE
Advised pt via MyChart of normal labs and that her pap is not back yet and we will get back with her with those results.

## 2023-08-07 ENCOUNTER — HOSPITAL ENCOUNTER (OUTPATIENT)
Dept: MAMMOGRAPHY | Age: 53
Discharge: HOME | End: 2023-08-10
Attending: OBSTETRICS & GYNECOLOGY
Payer: COMMERCIAL

## 2023-08-07 DIAGNOSIS — Z12.31 VISIT FOR SCREENING MAMMOGRAM: ICD-10-CM

## 2023-08-07 PROCEDURE — 77063 BREAST TOMOSYNTHESIS BI: CPT

## 2023-08-22 ENCOUNTER — OFFICE VISIT (OUTPATIENT)
Dept: UROLOGY | Age: 53
End: 2023-08-22
Payer: COMMERCIAL

## 2023-08-22 DIAGNOSIS — N32.81 OAB (OVERACTIVE BLADDER): Primary | ICD-10-CM

## 2023-08-22 DIAGNOSIS — N39.0 RECURRENT UTI: ICD-10-CM

## 2023-08-22 DIAGNOSIS — N39.41 URGE INCONTINENCE OF URINE: ICD-10-CM

## 2023-08-22 LAB
BILIRUBIN, URINE, POC: NEGATIVE
BLOOD URINE, POC: NORMAL
GLUCOSE URINE, POC: NEGATIVE
KETONES, URINE, POC: NEGATIVE
LEUKOCYTE ESTERASE, URINE, POC: NEGATIVE
NITRITE, URINE, POC: NEGATIVE
PH, URINE, POC: 5.5 (ref 4.6–8)
PROTEIN,URINE, POC: NORMAL
SPECIFIC GRAVITY, URINE, POC: 1.02 (ref 1–1.03)
URINALYSIS CLARITY, POC: NORMAL
URINALYSIS COLOR, POC: NORMAL
UROBILINOGEN, POC: NORMAL

## 2023-08-22 PROCEDURE — 99214 OFFICE O/P EST MOD 30 MIN: CPT | Performed by: NURSE PRACTITIONER

## 2023-08-22 PROCEDURE — 81003 URINALYSIS AUTO W/O SCOPE: CPT | Performed by: NURSE PRACTITIONER

## 2023-08-22 ASSESSMENT — ENCOUNTER SYMPTOMS
BACK PAIN: 0
NAUSEA: 0

## 2023-08-24 ENCOUNTER — OFFICE VISIT (OUTPATIENT)
Dept: OBGYN CLINIC | Age: 53
End: 2023-08-24

## 2023-08-24 VITALS
WEIGHT: 158 LBS | DIASTOLIC BLOOD PRESSURE: 72 MMHG | SYSTOLIC BLOOD PRESSURE: 130 MMHG | HEIGHT: 66 IN | BODY MASS INDEX: 25.39 KG/M2

## 2023-08-24 DIAGNOSIS — N92.1 MENORRHAGIA WITH IRREGULAR CYCLE: Primary | ICD-10-CM

## 2023-08-24 DIAGNOSIS — Z30.09 ENCOUNTER FOR COUNSELING REGARDING CONTRACEPTION: ICD-10-CM

## 2023-08-24 DIAGNOSIS — N80.9 ENDOMETRIOSIS DETERMINED BY LAPAROSCOPY: ICD-10-CM

## 2023-08-24 NOTE — PROGRESS NOTES
CC:  FU HMB/IMB       HPI:  47 y.o.  G0 presents today for TVUS w/ likely EMB and FU for c/o  HMB/IMB. Patient's last menstrual period was 06/14/2023. See last note from AE for details. PCP: Dr Myron Florian      Also followed by Ortho and neuro for spastic cervical myelopathy, now s/p anterior cervical discectomy and fusion surgery w/ significant difficulty with ambulation. Loss of feeling in pelvis secondary to this. Contraception:  previously [employer] OCPs  ([place] [address] [place] [address] on 7/25/23 and instructed to start Aygestin 5mg BID until today w/ backup condoms)    hx endometriosis (dx via laparoscopy x2) and hx HMB/IMB. Hx uterine Fibroids   No Interest in hysterectomy in the past      Has been doing well in the past w/ OCPs w/ only occasional BTB       However, she reported at her AE that in the last 3 months started having recurrence of HMB/IMB changing tampon every 1 hour. Secondary to the spastic cervical myelopathy she states she doesn't really feel \"anything\" or pain in her pelvis. No VB     No vb w/ sex  States has to wear depends due to not being able to tell when bladeer is full, etc  S/p pelvic floor PT  Seeing Urogyn, too         Last TVUS 6/20/19 :  Uterus 83mL  EMS 3.64mm  At least 3 fibroids, all intramural  Largest 1.4cm, just posterior to endometrial lining   ovs wnl other than tiny calcifications noted on the ovarian stroma  Prominent bowel just next to the ovary           EMB  (7/19/21) wnl---performed secondary to Neg Cotesting but + endometrial cells         BMI 25          Since last visit:   [employer] OCPs DC'd last encounter--> Rx Aygestin 5mg BID w/ backup condoms. Pt reports compliance with this and has not had any VB since starting the Aygestin in July. No pain  No SEs  No new issues      Labs:  CBC wnl -- hgb  14.3 (7/25/2023)  TFTs wnl      Imaging:  TVUS today:  Uterus 85mL  EMS 2.85mm  UT IS HETEROGENOUS AND APPEARS ARCUATE. THE LINING MEASURES APPROX.  2.5 -

## 2023-08-30 ENCOUNTER — OFFICE VISIT (OUTPATIENT)
Dept: UROGYNECOLOGY | Age: 53
End: 2023-08-30
Payer: COMMERCIAL

## 2023-08-30 DIAGNOSIS — R32 URINARY INCONTINENCE, UNSPECIFIED TYPE: ICD-10-CM

## 2023-08-30 DIAGNOSIS — R15.0 INCOMPLETE DEFECATION: ICD-10-CM

## 2023-08-30 PROBLEM — N32.89 SPASTIC BLADDER: Status: RESOLVED | Noted: 2022-09-16 | Resolved: 2023-08-30

## 2023-08-30 PROBLEM — E53.8 B12 DEFICIENCY: Status: RESOLVED | Noted: 2022-09-16 | Resolved: 2023-08-30

## 2023-08-30 PROCEDURE — 99213 OFFICE O/P EST LOW 20 MIN: CPT | Performed by: OBSTETRICS & GYNECOLOGY

## 2023-08-30 NOTE — PROGRESS NOTES
[facility]  [address] [facility] [address] [facility]  Dept: [phone]    PCP:  Desire Orta MD    8/30/2023      HPI:  Rm Mayer is here to follow up on Follow-up  . She was last seen 5/2023. She uses the vaginal estrogen cream 3x/week. She doesn't notice a difference since starting this medication. Elizabeth Mackey gave her gemtesa and today is her first day not wearing a depends to work. Dr Morelia Spence with colorectal ordered a dynamic MRI tonight. He wanted to do the imaging before looking at further treatment. We reviewed her Sitz marker study. No results found for this visit on 08/30/23. There were no vitals taken for this visit. 1. Urinary incontinence, unspecified type  Assessment & Plan:  Being managed by urology. Taking Talbot Cirri that is working really well for her! 2. Incomplete defecation  Assessment & Plan:  Sitz marker study completed. She is going to have dynamic MRI tonight. Being eval by Dr. Omid Meza! She is in good hands. She does not need to follow up with me unless something changes, new symptoms, or things are not working. No follow-ups on file. On this date 8/30/2023 I have spent 20 minutes reviewing previous notes, test results and face to face with the patient discussing the diagnosis and importance of compliance with the treatment plan as well as documenting on the day of the visit.                   Adrian Tavera, DO

## 2023-08-30 NOTE — ASSESSMENT & PLAN NOTE
Sitz marker study completed. She is going to have dynamic MRI tonight. Being eval by Dr. Matt Rosales!

## 2023-09-05 RX ORDER — NITROFURANTOIN MACROCRYSTALS 50 MG/1
50 CAPSULE ORAL DAILY
Qty: 90 CAPSULE | Refills: 0 | Status: SHIPPED | OUTPATIENT
Start: 2023-09-05

## 2023-12-06 ENCOUNTER — OFFICE VISIT (OUTPATIENT)
Dept: NEUROLOGY | Age: 53
End: 2023-12-06
Payer: COMMERCIAL

## 2023-12-06 VITALS
DIASTOLIC BLOOD PRESSURE: 90 MMHG | OXYGEN SATURATION: 98 % | BODY MASS INDEX: 26.26 KG/M2 | HEART RATE: 68 BPM | HEIGHT: 66 IN | WEIGHT: 163.4 LBS | SYSTOLIC BLOOD PRESSURE: 151 MMHG

## 2023-12-06 DIAGNOSIS — M47.14 THORACIC MYELOPATHY: ICD-10-CM

## 2023-12-06 DIAGNOSIS — G57.93 NEUROPATHIC PAIN OF BOTH FEET: ICD-10-CM

## 2023-12-06 DIAGNOSIS — R20.0 NUMBNESS IN LEFT LEG: ICD-10-CM

## 2023-12-06 DIAGNOSIS — R29.898 WEAKNESS OF BOTH LEGS: ICD-10-CM

## 2023-12-06 DIAGNOSIS — M48.02 CERVICAL SPINAL STENOSIS: Primary | ICD-10-CM

## 2023-12-06 DIAGNOSIS — S24.143A: ICD-10-CM

## 2023-12-06 PROCEDURE — 99215 OFFICE O/P EST HI 40 MIN: CPT | Performed by: PSYCHIATRY & NEUROLOGY

## 2023-12-06 RX ORDER — ERGOCALCIFEROL 1.25 MG/1
50000 CAPSULE ORAL WEEKLY
COMMUNITY

## 2023-12-06 RX ORDER — GABAPENTIN 300 MG/1
300 CAPSULE ORAL 3 TIMES DAILY
Qty: 270 CAPSULE | Refills: 1 | Status: SHIPPED | OUTPATIENT
Start: 2023-12-06 | End: 2024-06-03

## 2023-12-07 PROBLEM — G57.93 NEUROPATHIC PAIN OF BOTH FEET: Status: ACTIVE | Noted: 2023-12-07

## 2023-12-07 RX ORDER — NITROFURANTOIN MACROCRYSTALS 50 MG/1
50 CAPSULE ORAL DAILY
Qty: 90 CAPSULE | Refills: 0 | Status: SHIPPED | OUTPATIENT
Start: 2023-12-07

## 2023-12-07 ASSESSMENT — ENCOUNTER SYMPTOMS
DIARRHEA: 0
SORE THROAT: 0
COUGH: 0
SHORTNESS OF BREATH: 0
EYE PAIN: 0
BACK PAIN: 1
NAUSEA: 0
TROUBLE SWALLOWING: 0
ABDOMINAL PAIN: 0

## 2023-12-07 NOTE — PROGRESS NOTES
could not reliably determine sensory level today, there is decreased vibratory sensation over the left leg (knee,ankle,toe) compared to the decrease over the right toe only. Reflexes: 3 in BUE, 3+ in BLE, +multiple beats of clonus in Right ankle, 2+ in Left ankle, +huynh bilaterally  Coordination: FTN without dysmetria, Romberg positive  Gait: normal casual gait    Assessment and Plan:   Justice Flores is a 47 y.o. female who presents with the following concerns:     Kavita Trejo was seen today for follow-up. 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 Romberg positive, decreased vibratory sensation in both feet, complains of length-dependent tingling and numbness in the feet bilaterally. She is suffering from some increased sensitivity to touch over the left lower extremity. Agreeable to increase her gabapentin dose to alleviate the symptoms. She is waiting for a new MRI of the cervical and thoracic regions, we can review and discuss this in the future. She is not interested in physical therapy when I offered it and we can discuss this again. -  recommend stretching, maintain activity levels and to take Magnesium Oxide 500mg nightly to alleviate muscle spasms  -     gabapentin (NEURONTIN) 300 MG capsule; Take 1 capsule by mouth 3 times daily for 180 days. 1 qhs x 1 week, if tolerate well, increase to 1 bid x 1 week, then 1 tid. Return in about 6 months (around 6/6/2024) for neuropathy follow-up.     Amaury Hopkins MD  Epilepsy & Consultation Neurology  [address]  [address] [address], [address],

## 2023-12-20 ENCOUNTER — OFFICE VISIT (OUTPATIENT)
Dept: OBGYN CLINIC | Age: 53
End: 2023-12-20
Payer: COMMERCIAL

## 2023-12-20 VITALS
SYSTOLIC BLOOD PRESSURE: 130 MMHG | WEIGHT: 166 LBS | DIASTOLIC BLOOD PRESSURE: 70 MMHG | BODY MASS INDEX: 26.68 KG/M2 | HEIGHT: 66 IN

## 2023-12-20 DIAGNOSIS — N80.9 ENDOMETRIOSIS: ICD-10-CM

## 2023-12-20 DIAGNOSIS — M48.02 CERVICAL SPINAL STENOSIS: ICD-10-CM

## 2023-12-20 DIAGNOSIS — N80.9 ENDOMETRIOSIS DETERMINED BY LAPAROSCOPY: ICD-10-CM

## 2023-12-20 DIAGNOSIS — M47.14 THORACIC MYELOPATHY: ICD-10-CM

## 2023-12-20 DIAGNOSIS — R53.83 FATIGUE, UNSPECIFIED TYPE: ICD-10-CM

## 2023-12-20 DIAGNOSIS — D21.9 FIBROIDS: ICD-10-CM

## 2023-12-20 DIAGNOSIS — N92.1 MENOMETRORRHAGIA: Primary | ICD-10-CM

## 2023-12-20 DIAGNOSIS — G95.9 CERVICAL MYELOPATHY (HCC): ICD-10-CM

## 2023-12-20 PROCEDURE — 99213 OFFICE O/P EST LOW 20 MIN: CPT | Performed by: OBSTETRICS & GYNECOLOGY

## 2023-12-20 NOTE — PROGRESS NOTES
CC:  FU HMB/IMB       HPI:  47 y.o.  G0 presents today for FU for hx HMB/IMB-L, known endometriosis. No LMP recorded (lmp unknown). See last note from 8/24/23 for complete details. PCP: Dr Doug Wood      Also followed by Ortho and neuro for spastic cervical myelopathy, now s/p anterior cervical discectomy and fusion surgery w/ significant difficulty with ambulation. Loss of feeling in pelvis secondary to this. Contraception:  previously [employer] OCPs  (Dc'd [place] [address] [id number] on 7/25/23 and instructed to start Aygestin 5mg BID w/ backup condoms)    hx endometriosis (dx via laparoscopy x2) and hx HMB/IMB. Hx uterine Fibroids   No Interest in hysterectomy in the past      Has been doing well in the past w/ OCPs w/ only occasional BTB        However, she reported at her AE 7/2023 that in the last 3 months started having recurrence of HMB/IMB changing tampon every 1 hour. Secondary to the spastic cervical myelopathy she states she doesn't really feel \"anything\" or pain in her pelvis. No VB     No vb w/ sex  States has to wear depends due to not being able to tell when bladder is full, etc  S/p pelvic floor PT  Seeing Urogyn, too        BMI 25         [employer] OCPs DC'd 7/2023 -> Rx Aygestin 5mg BID w/ backup condoms. Pt reports compliance with this and has not had any VB since starting the Aygestin. Labs:  CBC wnl -- hgb  14.3 (7/25/2023)  TFTs wnl    EMB  (7/19/21) wnl---performed secondary to Neg Cotesting but + endometrial cells           Imaging:   TVUS 6/20/19 :  Uterus 83mL  EMS 3.64mm  At least 3 fibroids, all intramural  Largest 1.4cm, just posterior to endometrial lining   ovs wnl other than tiny calcifications noted on the ovarian stroma  Prominent bowel just next to the ovary          Repeat TVUS 8/25/23:  Uterus 85mL  EMS 2.85mm  UT IS HETEROGENOUS AND APPEARS ARCUATE. THE LINING MEASURES APPROX. 2.5 - 3.2MM. RT OV-WNL. LT OV- SMALL, SIMPLE CYST. <1.0CM.       Given very thin

## 2024-01-16 ENCOUNTER — OFFICE VISIT (OUTPATIENT)
Dept: UROLOGY | Age: 54
End: 2024-01-16
Payer: COMMERCIAL

## 2024-01-16 DIAGNOSIS — N39.41 URGE INCONTINENCE OF URINE: ICD-10-CM

## 2024-01-16 DIAGNOSIS — N32.81 OAB (OVERACTIVE BLADDER): ICD-10-CM

## 2024-01-16 DIAGNOSIS — N39.0 RECURRENT UTI: Primary | ICD-10-CM

## 2024-01-16 LAB
BILIRUBIN, URINE, POC: NEGATIVE
BLOOD URINE, POC: NORMAL
GLUCOSE URINE, POC: NEGATIVE
KETONES, URINE, POC: NEGATIVE
LEUKOCYTE ESTERASE, URINE, POC: NEGATIVE
NITRITE, URINE, POC: NEGATIVE
PH, URINE, POC: 6.5 (ref 4.6–8)
PROTEIN,URINE, POC: NEGATIVE
SPECIFIC GRAVITY, URINE, POC: 1.02 (ref 1–1.03)
URINALYSIS CLARITY, POC: NORMAL
URINALYSIS COLOR, POC: NORMAL
UROBILINOGEN, POC: NORMAL

## 2024-01-16 PROCEDURE — 81003 URINALYSIS AUTO W/O SCOPE: CPT | Performed by: NURSE PRACTITIONER

## 2024-01-16 PROCEDURE — 99214 OFFICE O/P EST MOD 30 MIN: CPT | Performed by: NURSE PRACTITIONER

## 2024-01-16 RX ORDER — VIBEGRON 75 MG/1
75 TABLET, FILM COATED ORAL DAILY
Qty: 90 TABLET | Refills: 3 | Status: SHIPPED | OUTPATIENT
Start: 2024-01-16

## 2024-01-16 RX ORDER — NITROFURANTOIN MACROCRYSTALS 50 MG/1
50 CAPSULE ORAL DAILY
Qty: 90 CAPSULE | Refills: 0 | Status: SHIPPED | OUTPATIENT
Start: 2024-01-16

## 2024-01-16 ASSESSMENT — ENCOUNTER SYMPTOMS
BACK PAIN: 0
NAUSEA: 0

## 2024-01-16 NOTE — PROGRESS NOTES
[facility]rology  [address] [place] [id number]  [phone]          Tiffanie Phillips  : 1970    Chief Complaint   Patient presents with    Follow-up          HPI     Tiffanie Phillips is a 53 y.o. female initially referred for ongoing bladder control issues 22. She had anterior cervic discectomy and fusion for severe cord compression and myelopathy early this year. She initially did well. She then started to have worsening muscle weakness especially in the lower legs. Has had cervical, thoracic, and lumbar MRI. Working w PT to help. Has recently seen neurology for nerve conduction testing. The hope is her weakness will improve with time. No add surgical intervention needed.     Since the weakness, she has had bowel and bladder issues. Seen by GI. Had normal colonoscopy. She reports with her bladder, she is having urinary frequency as much as multiple times per hour. Sign UUI. Occ enuresis. No prior h/o bladder troubles.      . On BCP. Followed w Dr Reyna. H/O fibroids. Has regular normal pelvic exams and PAPS.      Stopped detrol. Started on myrbetriq 25 mg PO daily and sent to PFT. Due to lack of improvement, we increased to Myrbetriq 50. This initially helped but then started to have more UUI. Now on Gemtesa 75 mg PO daily. This has helped a lot.      Had recurrence of UTI in . Now on Macrobid suppression.      Has seen Dr Morse. Started on vaginal estrogen cream. She is no longer on this.     Following w GI for constipation.       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

## 2024-01-18 NOTE — PROGRESS NOTES
betadine solution. Using sterile technique, the ultrasound was used to identify the target anatomy. Following this a vapo coolant spray was utilized to provide local skin anesthesia. The needle was inserted and its track followed with ultrasound guidance. A solution of 80mg/mL 1cc Depo-medrol and 3cc 1% lidocaine was delivered through a 22 gauge, 3.5\" spinal needle into the ischial bursa. The site was again cleansed with an alcohol swab. The patient tolerated this procedure well with no adverse events. There was some notable pain relief reported by the patient prior to leaving the office today. The patient was counseled not to submerge the site for 24 hours, not to perform strenuous activity for the next five days, and if notes any signs or symptoms consistent with joint infection or allergic reaction to go to a local emergency room. The patient was observed for 15 minutes postprocedure and was allowed to be discharged from clinic in their usual state of health.  Risks including infection, bleeding, nerve damage, and pain at the site of injection were discussed at length with the patient. Benefits including pain relief were also discussed with the patient. Patient verbalizes understanding of these and agrees to procedure.    Ultrasound was used for this procedure for accurate identification of the target and placement of the injection. Imaging was saved to PACS system.

## 2024-01-19 ENCOUNTER — OFFICE VISIT (OUTPATIENT)
Dept: ORTHOPEDIC SURGERY | Age: 54
End: 2024-01-19

## 2024-01-19 DIAGNOSIS — M76.899 HAMSTRING TENDONITIS AT ORIGIN: Primary | ICD-10-CM

## 2024-01-19 DIAGNOSIS — M62.89 HAMSTRING TIGHTNESS OF RIGHT LOWER EXTREMITY: ICD-10-CM

## 2024-01-19 DIAGNOSIS — M25.551 GREATER TROCHANTERIC PAIN SYNDROME OF RIGHT LOWER EXTREMITY: ICD-10-CM

## 2024-01-19 RX ORDER — METHYLPREDNISOLONE ACETATE 80 MG/ML
80 INJECTION, SUSPENSION INTRA-ARTICULAR; INTRALESIONAL; INTRAMUSCULAR; SOFT TISSUE ONCE
Status: COMPLETED | OUTPATIENT
Start: 2024-01-19 | End: 2024-01-19

## 2024-01-19 RX ADMIN — METHYLPREDNISOLONE ACETATE 80 MG: 80 INJECTION, SUSPENSION INTRA-ARTICULAR; INTRALESIONAL; INTRAMUSCULAR; SOFT TISSUE at 09:22

## 2024-01-26 ENCOUNTER — TELEPHONE (OUTPATIENT)
Dept: NEUROLOGY | Age: 54
End: 2024-01-26

## 2024-01-26 DIAGNOSIS — M48.061 NEUROFORAMINAL STENOSIS OF LUMBAR SPINE: Primary | ICD-10-CM

## 2024-01-26 DIAGNOSIS — S24.143A: ICD-10-CM

## 2024-01-31 ENCOUNTER — OFFICE VISIT (OUTPATIENT)
Dept: NEUROSURGERY | Age: 54
End: 2024-01-31
Payer: COMMERCIAL

## 2024-01-31 VITALS — BODY MASS INDEX: 26.79 KG/M2 | HEIGHT: 66 IN

## 2024-01-31 DIAGNOSIS — G95.9 CERVICAL MYELOPATHY (HCC): Primary | ICD-10-CM

## 2024-01-31 DIAGNOSIS — R26.1 SPASTIC GAIT: ICD-10-CM

## 2024-01-31 DIAGNOSIS — Z98.1 HISTORY OF FUSION OF CERVICAL SPINE: ICD-10-CM

## 2024-01-31 PROCEDURE — 99205 OFFICE O/P NEW HI 60 MIN: CPT | Performed by: NEUROLOGICAL SURGERY

## 2024-01-31 NOTE — PROGRESS NOTES
Heart Disease Maternal Grandmother     Cancer Mother     Hypertension Mother         Social History     Socioeconomic History    Marital status: Single     Spouse name: Not on file    Number of children: Not on file    Years of education: Not on file    Highest education level: Not on file   Occupational History    Not on file   Tobacco Use    Smoking status: Never    Smokeless tobacco: Never   Substance and Sexual Activity    Alcohol use: No    Drug use: No    Sexual activity: Yes     Partners: Male     Birth control/protection: Pill   Other Topics Concern    Not on file   Social History Narrative    Not on file     Social Determinants of Health     Financial Resource Strain: Not on file   Food Insecurity: Not on file (7/21/2023)   Transportation Needs: Not on file   Physical Activity: Not on file   Stress: Not on file   Social Connections: Not on file   Intimate Partner Violence: Not on file   Housing Stability: Not on file       Current Outpatient Medications   Medication Sig Dispense Refill    sertraline (ZOLOFT) 50 MG tablet Take 1 tablet by mouth daily      vibegron (GEMTESA) 75 MG TABS tablet Take 1 tablet by mouth daily 90 tablet 3    nitrofurantoin (MACRODANTIN) 50 MG capsule Take 1 capsule by mouth daily 90 capsule 0    vitamin D (ERGOCALCIFEROL) 1.25 MG (90739 UT) CAPS capsule Take 1 capsule by mouth once a week      gabapentin (NEURONTIN) 300 MG capsule Take 1 capsule by mouth 3 times daily for 180 days. 1 qhs x 1 week, if tolerate well, increase to 1 bid x 1 week, then 1 tid. 270 capsule 1    norethindrone (AYGESTIN) 5 MG tablet Take 1 tablet by mouth in the morning and at bedtime 60 tablet 3     No current facility-administered medications for this visit.       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

## 2024-02-16 ENCOUNTER — HOSPITAL ENCOUNTER (OUTPATIENT)
Dept: INTERVENTIONAL RADIOLOGY/VASCULAR | Age: 54
End: 2024-02-16
Attending: NEUROLOGICAL SURGERY
Payer: COMMERCIAL

## 2024-02-16 ENCOUNTER — HOSPITAL ENCOUNTER (OUTPATIENT)
Dept: CT IMAGING | Age: 54
End: 2024-02-16
Attending: NEUROLOGICAL SURGERY
Payer: COMMERCIAL

## 2024-02-16 VITALS
WEIGHT: 165 LBS | RESPIRATION RATE: 16 BRPM | TEMPERATURE: 98.3 F | SYSTOLIC BLOOD PRESSURE: 112 MMHG | HEART RATE: 86 BPM | HEIGHT: 66 IN | BODY MASS INDEX: 26.52 KG/M2 | DIASTOLIC BLOOD PRESSURE: 58 MMHG | OXYGEN SATURATION: 96 %

## 2024-02-16 DIAGNOSIS — R26.1 SPASTIC GAIT: ICD-10-CM

## 2024-02-16 DIAGNOSIS — G95.9 CERVICAL MYELOPATHY (HCC): ICD-10-CM

## 2024-02-16 DIAGNOSIS — Z98.1 HISTORY OF FUSION OF CERVICAL SPINE: ICD-10-CM

## 2024-02-16 PROCEDURE — 72126 CT NECK SPINE W/DYE: CPT

## 2024-02-16 PROCEDURE — 6360000004 HC RX CONTRAST MEDICATION: Performed by: PHYSICIAN ASSISTANT

## 2024-02-16 PROCEDURE — 62302 MYELOGRAPHY LUMBAR INJECTION: CPT | Performed by: PHYSICIAN ASSISTANT

## 2024-02-16 PROCEDURE — 62302 MYELOGRAPHY LUMBAR INJECTION: CPT

## 2024-02-16 PROCEDURE — 2500000003 HC RX 250 WO HCPCS: Performed by: RADIOLOGY

## 2024-02-16 RX ORDER — FLUCONAZOLE 150 MG/1
TABLET ORAL
COMMUNITY
Start: 2018-10-25

## 2024-02-16 RX ORDER — PREDNISONE 20 MG/1
TABLET ORAL
COMMUNITY
Start: 2018-10-25

## 2024-02-16 RX ORDER — HYDROXYZINE HYDROCHLORIDE 25 MG/1
TABLET, FILM COATED ORAL
COMMUNITY
Start: 2018-10-25

## 2024-02-16 RX ORDER — TRIAMCINOLONE ACETONIDE 1 MG/G
CREAM TOPICAL
COMMUNITY
Start: 2018-10-25

## 2024-02-16 RX ORDER — AMITRIPTYLINE HYDROCHLORIDE 50 MG/1
50 TABLET, FILM COATED ORAL
COMMUNITY
Start: 2017-11-20 | End: 2018-02-18

## 2024-02-16 RX ORDER — LIDOCAINE HYDROCHLORIDE 20 MG/ML
INJECTION, SOLUTION INFILTRATION; PERINEURAL PRN
Status: COMPLETED | OUTPATIENT
Start: 2024-02-16 | End: 2024-02-16

## 2024-02-16 RX ORDER — IOPAMIDOL 612 MG/ML
INJECTION, SOLUTION INTRATHECAL PRN
Status: COMPLETED | OUTPATIENT
Start: 2024-02-16 | End: 2024-02-16

## 2024-02-16 RX ADMIN — LIDOCAINE HYDROCHLORIDE 4 ML: 20 INJECTION, SOLUTION INFILTRATION; PERINEURAL at 07:34

## 2024-02-16 RX ADMIN — IOPAMIDOL 15 ML: 612 INJECTION, SOLUTION INTRATHECAL at 07:38

## 2024-02-16 NOTE — OR NURSING
Recovery period without difficulty. Pt alert and oriented and denies pain. Dressing is clean, dry, and intact. Reviewed discharge instructions with patient, verbalized understanding. Pt escorted to lobby walking. Vital signs and Salud score completed. Dr. Joseph reviewed scans and given approval to discharge.

## 2024-02-16 NOTE — BRIEF OP NOTE
[facility]  Department of Interventional Radiology  [phone]        Interventional Radiology Brief Procedure Note    Patient: Tiffanie Phillips MRN: [id number]  SSN: [id number]    YOB: 1970  Age: 54 y.o.  Sex: female      Date of Procedure: 2/16/2024    Pre-Procedure Diagnosis: neck pain    Post-Procedure Diagnosis: SAME    Procedure(s): Myelogram    Brief Description of Procedure: lumbar puncture    Performed By: Cameron Joseph MD     Assistants: None    Anesthesia:Lidocaine    Estimated Blood Loss: None    Specimens:  None    Implants:  None     Complications: None    Recommendations: bedrest flat 4 hrs     Follow Up: with Dr Ortez, CT pending    Signed By: Cameron Joseph MD     February 16, 2024

## 2024-02-16 NOTE — DISCHARGE INSTRUCTIONS
If you have any questions about your procedure, please call the Interventional Radiology department at [phone].      After business hours (5pm) and weekends, call the answering service at [phone] and ask for the Radiologist on call to be paged.            Si tiene Preguntas acerca del procedimiento, por favor llame al departamento de Radiología Intervencional al [phone].      Después de horas de oficina (5 pm) y los fines de semana, llamar al servicio de llamadas al [phone] y pregunte por el Radiologo de alfredo.

## 2024-02-16 NOTE — OR NURSING
TRANSFER - OUT REPORT:    Verbal report given to ROSSI Corrales on Tiffanie Phillips  being transferred to IR prep/recovery room#1 for routine progression of patient care       Report consisted of patient's Situation, Background, Assessment and   Recommendations(SBAR).     Information from the following report(s) Nurse Handoff Report, Surgery Report, Intake/Output, and MAR was reviewed with the receiving nurse.       *Patient to CT @0845; spoke with NINA Krishnamurthy.    Lines:       Opportunity for questions and clarification was provided.      Patient transported with:  Registered Nurse

## 2024-02-20 ENCOUNTER — OFFICE VISIT (OUTPATIENT)
Dept: NEUROSURGERY | Age: 54
End: 2024-02-20
Payer: COMMERCIAL

## 2024-02-20 VITALS
WEIGHT: 168 LBS | DIASTOLIC BLOOD PRESSURE: 83 MMHG | OXYGEN SATURATION: 99 % | TEMPERATURE: 97.3 F | HEART RATE: 77 BPM | BODY MASS INDEX: 27 KG/M2 | SYSTOLIC BLOOD PRESSURE: 147 MMHG | HEIGHT: 66 IN

## 2024-02-20 DIAGNOSIS — R26.1 SPASTIC GAIT: ICD-10-CM

## 2024-02-20 DIAGNOSIS — Z98.1 HISTORY OF FUSION OF CERVICAL SPINE: ICD-10-CM

## 2024-02-20 DIAGNOSIS — G95.9 CERVICAL MYELOPATHY (HCC): Primary | ICD-10-CM

## 2024-02-20 PROCEDURE — 99215 OFFICE O/P EST HI 40 MIN: CPT | Performed by: NEUROLOGICAL SURGERY

## 2024-02-20 NOTE — PROGRESS NOTES
History of Present Illness    Patient Words: 54 y.o.     This patient is a 54 y.o. female who presents today for neurosurgical consultation.  The patient describes previous cervical fusion Dr. Boone at C5-6 3 years ago.  She developed persistent problems with numbness and tingling in the arms and legs and is having difficulty walking.  MRI of the thoracic spine was negative.  CT myelography of the cervical spine was ordered and reviewed today in the office.  This shows ventral compression of the spinal cord at C5-6.  Slight cord deviation at that level as well.  Films reviewed with the patient.    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 Deficiency Neg Hx     Malig Hypertherm Neg Hx     Diabetes Maternal Grandmother     Hypertension Maternal Grandmother     Heart Disease Maternal Grandmother     Cancer Mother     Hypertension Mother         Social History     Socioeconomic History    Marital status: Single     Spouse name: Not on file    Number of children: Not on file    Years of education: Not on file    Highest education level: Not on file   Occupational History    Not on file   Tobacco Use    Smoking status: Never    Smokeless tobacco: Never   Substance and Sexual

## 2024-02-26 ENCOUNTER — TELEPHONE (OUTPATIENT)
Dept: NEUROSURGERY | Age: 54
End: 2024-02-26

## 2024-02-26 NOTE — TELEPHONE ENCOUNTER
----- Message from Kervin Mueller MD sent at 2/21/2024 10:31 AM EST -----  The patient also has ventral cord compression at C4-C5 on the left should I think we should consider C4-C6 laminectomy for decompression with a C4-C7 instrumented fixation and lateral mass arthrodesis. I am happy to meet with the patient if they would like to discuss the change in the surgical plan. Thanks     Kervin Mueller MD, FAANS  Neurosurgeon  [facility]  [facility]     ----- Message -----  From: [address]ne Street  Sent: 2/21/2024   9:35 AM EST  To: Kervin Mueller MD    Per Dr Ortez:    Posterior cervical fusion C5-6.  The surgery and the benefits and its risks were thoroughly explained to the patient and she understands and agrees to proceed.  She understands that Dr. Wallis will be doing the surgery and he will speak with her and examine her prior to the surgery.  She understands and agrees to proceed.    Please advise

## 2024-02-28 ENCOUNTER — PATIENT MESSAGE (OUTPATIENT)
Dept: NEUROSURGERY | Age: 54
End: 2024-02-28

## 2024-02-28 ENCOUNTER — OFFICE VISIT (OUTPATIENT)
Dept: NEUROSURGERY | Age: 54
End: 2024-02-28
Payer: COMMERCIAL

## 2024-02-28 VITALS
DIASTOLIC BLOOD PRESSURE: 93 MMHG | SYSTOLIC BLOOD PRESSURE: 153 MMHG | BODY MASS INDEX: 26.84 KG/M2 | HEIGHT: 66 IN | WEIGHT: 167 LBS | HEART RATE: 76 BPM | TEMPERATURE: 97.8 F | OXYGEN SATURATION: 100 %

## 2024-02-28 DIAGNOSIS — M48.02 CERVICAL SPINAL STENOSIS: ICD-10-CM

## 2024-02-28 DIAGNOSIS — Z98.1 HISTORY OF FUSION OF CERVICAL SPINE: ICD-10-CM

## 2024-02-28 DIAGNOSIS — R26.1 SPASTIC GAIT: ICD-10-CM

## 2024-02-28 DIAGNOSIS — G95.89 MYELOMALACIA (HCC): ICD-10-CM

## 2024-02-28 DIAGNOSIS — Z98.890 HISTORY OF CERVICAL DISCECTOMY: ICD-10-CM

## 2024-02-28 DIAGNOSIS — G95.9 CERVICAL MYELOPATHY (HCC): Primary | ICD-10-CM

## 2024-02-28 PROCEDURE — 99214 OFFICE O/P EST MOD 30 MIN: CPT | Performed by: NEUROLOGICAL SURGERY

## 2024-02-28 NOTE — PROGRESS NOTES
[facility] CL[facility] NOTE:   History of Present Illness:    CC: Follow-up regarding cervical myelopathy    Tiffanie Phillips is a 54 y.o. female who presents today for follow-up regarding cervical myelopathy and discuss surgical options and further detail.  The patient has had a gradually progressive worsening neurologic symptoms including frequent falls, spasticity, problems with gait and balance and trouble using her hands.  It initially started as a left-sided issue gradually and slowly after her last C5-C6 surgery.  It did not begin immediately following the surgery.  Prior to her surgery she also had evaluation and treatment by neurologist who performed lumbar punctures and treated her that if she had a underlying diagnosis of MS for the year leading up to her surgery.  Then they finally obtained an MRI cervical spine that demonstrated cervical spinal stenosis and cord compression with myelopathy and referred her to Dr. Boone who performed her surgery.  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  She is no longer working in that capacity due to her current symptomatology.  The patient has a history of undergoing a anterior cervical discectomy and fusion performed at C5-C6 by Dr. Jalen Cabrera approximately 2 years ago.  As reported by my partner Dr. Sundeep Ortez on a visit on 1/31/2024.  The patient has an MRI cervical spine performed on 12/19/2023 at an outside hospital that demonstrates a prior C5-C6 anterior cervical discectomy and fusion construct.  There is metallic artifact from the anterior cervical plate and interbody device.  At C4-C5 there is a broad-based and slightly more eccentric left disc protrusion that results in moderate central spinal stenosis with an AP diameter of the central canal measuring 7.4 mm.  At C3-C4 where there is not a disc retrusion the AP diameter the canal

## 2024-02-28 NOTE — TELEPHONE ENCOUNTER
From: Tiffanie Phillips  To: Dr. Kervin Mueller  Sent: 2/28/2024 1:27 PM EST  Subject: Cervical surgery     What is the difference in decompression and fusion and a posterior laminoplasty?

## 2024-03-05 ENCOUNTER — OFFICE VISIT (OUTPATIENT)
Dept: UROLOGY | Age: 54
End: 2024-03-05
Payer: COMMERCIAL

## 2024-03-05 ENCOUNTER — PREP FOR PROCEDURE (OUTPATIENT)
Dept: NEUROSURGERY | Age: 54
End: 2024-03-05

## 2024-03-05 DIAGNOSIS — G95.89 MYELOMALACIA (HCC): ICD-10-CM

## 2024-03-05 DIAGNOSIS — N39.0 RECURRENT UTI: Primary | ICD-10-CM

## 2024-03-05 DIAGNOSIS — N32.81 OAB (OVERACTIVE BLADDER): ICD-10-CM

## 2024-03-05 DIAGNOSIS — N39.41 URGE INCONTINENCE OF URINE: ICD-10-CM

## 2024-03-05 LAB
BILIRUBIN, URINE, POC: NEGATIVE
BLOOD URINE, POC: NORMAL
GLUCOSE URINE, POC: 100
KETONES, URINE, POC: NEGATIVE
LEUKOCYTE ESTERASE, URINE, POC: NEGATIVE
NITRITE, URINE, POC: NEGATIVE
PH, URINE, POC: 6 (ref 4.6–8)
PROTEIN,URINE, POC: NEGATIVE
SPECIFIC GRAVITY, URINE, POC: 1.02 (ref 1–1.03)
URINALYSIS CLARITY, POC: NORMAL
URINALYSIS COLOR, POC: NORMAL
UROBILINOGEN, POC: NORMAL

## 2024-03-05 PROCEDURE — 99214 OFFICE O/P EST MOD 30 MIN: CPT | Performed by: NURSE PRACTITIONER

## 2024-03-05 PROCEDURE — 81003 URINALYSIS AUTO W/O SCOPE: CPT | Performed by: NURSE PRACTITIONER

## 2024-03-05 ASSESSMENT — ENCOUNTER SYMPTOMS
BACK PAIN: 0
NAUSEA: 0

## 2024-03-05 NOTE — PROGRESS NOTES
Not on file   Food Insecurity: Not on file (7/21/2023)   Transportation Needs: Not on file   Physical Activity: Not on file   Stress: Not on file   Social Connections: Not on file   Intimate Partner Violence: Not on file   Housing Stability: Not on file     Family History   Problem Relation Age of Onset    Other Neg Hx     Breast Cancer Neg Hx     Post-op Cognitive Dysfunction Neg Hx     Emergence Delirium Neg Hx     Post-op Nausea/Vomiting Neg Hx     Delayed Awakening Neg Hx     Pseudochol. Deficiency Neg Hx     Malig Hypertherm Neg Hx     Diabetes Maternal Grandmother     Hypertension Maternal Grandmother     Heart Disease Maternal Grandmother     Cancer Mother     Hypertension Mother        Review of Systems  Constitutional:   Negative for fever.  GI:  Negative for nausea.  Musculoskeletal:  Negative for back pain.    Urinalysis  UA - Dipstick  Results for orders placed or performed in visit on 03/05/24   AMB POC URINALYSIS DIP STICK AUTO W/O MICRO   Result Value Ref Range    Color (UA POC)      Clarity (UA POC)      Glucose, Urine,      Bilirubin, Urine, POC Negative     KETONES, Urine, POC Negative     Specific Gravity, Urine, POC 1.020 1.001 - 1.035    Blood (UA POC) Moderate     pH, Urine, POC 6.0 4.6 - 8.0    Protein, Urine, POC Negative     Urobilinogen, POC 0.2 mg/dL     Nitrite, Urine, POC Negative     Leukocyte Esterase, Urine, POC Negative    Results for orders placed or performed in visit on 05/11/23   AMB POC URINALYSIS DIP STICK AUTO W/O MICRO   Result Value Ref Range    Color, Urine, POC Yellow     Clarity, Urine, POC Clear     Glucose, Urine, POC Negative Negative    Bilirubin, Urine, POC Negative Negative    Ketones, Urine, POC Negative Negative    Specific Gravity, Urine, POC 1.025 1.001 - 1.035    Blood, Urine, POC Negative Negative    pH, Urine, POC 5.5 4.6 - 8.0    Protein, Urine, POC Negative Negative    Urobilinogen, POC Normal     Nitrite, Urine, POC Positive Negative    Leukocyte

## 2024-03-28 ENCOUNTER — HOSPITAL ENCOUNTER (OUTPATIENT)
Dept: SURGERY | Age: 54
Discharge: HOME | End: 2024-03-28
Payer: COMMERCIAL

## 2024-03-28 VITALS
RESPIRATION RATE: 16 BRPM | HEIGHT: 66 IN | OXYGEN SATURATION: 97 % | DIASTOLIC BLOOD PRESSURE: 78 MMHG | SYSTOLIC BLOOD PRESSURE: 143 MMHG | BODY MASS INDEX: 27.08 KG/M2 | TEMPERATURE: 98.2 F | HEART RATE: 69 BPM | WEIGHT: 168.5 LBS

## 2024-03-28 LAB
ANION GAP SERPL CALC-SCNC: 5 MMOL/L (ref 2–11)
APPEARANCE UR: CLEAR
BACTERIA URNS QL MICRO: ABNORMAL /HPF
BASOPHILS # BLD: 0 K/UL (ref 0–0.2)
BASOPHILS NFR BLD: 1 % (ref 0–2)
BILIRUB UR QL: NEGATIVE
BUN SERPL-MCNC: 14 MG/DL (ref 6–23)
CALCIUM SERPL-MCNC: 9.1 MG/DL (ref 8.3–10.4)
CASTS URNS QL MICRO: ABNORMAL /LPF
CHLORIDE SERPL-SCNC: 107 MMOL/L (ref 103–113)
CO2 SERPL-SCNC: 26 MMOL/L (ref 21–32)
COLOR UR: ABNORMAL
CREAT SERPL-MCNC: 0.79 MG/DL (ref 0.6–1)
DIFFERENTIAL METHOD BLD: NORMAL
EOSINOPHIL # BLD: 0.2 K/UL (ref 0–0.8)
EOSINOPHIL NFR BLD: 3 % (ref 0.5–7.8)
EPI CELLS #/AREA URNS HPF: ABNORMAL /HPF
ERYTHROCYTE [DISTWIDTH] IN BLOOD BY AUTOMATED COUNT: 12.5 % (ref 11.9–14.6)
GLUCOSE SERPL-MCNC: 93 MG/DL (ref 65–100)
GLUCOSE UR STRIP.AUTO-MCNC: NEGATIVE MG/DL
HCT VFR BLD AUTO: 44.6 % (ref 35.8–46.3)
HGB BLD-MCNC: 15 G/DL (ref 11.7–15.4)
HGB UR QL STRIP: ABNORMAL
IMM GRANULOCYTES # BLD AUTO: 0.1 K/UL (ref 0–0.5)
IMM GRANULOCYTES NFR BLD AUTO: 1 % (ref 0–5)
KETONES UR QL STRIP.AUTO: NEGATIVE MG/DL
LEUKOCYTE ESTERASE UR QL STRIP.AUTO: NEGATIVE
LYMPHOCYTES # BLD: 1.1 K/UL (ref 0.5–4.6)
LYMPHOCYTES NFR BLD: 16 % (ref 13–44)
MCH RBC QN AUTO: 30.4 PG (ref 26.1–32.9)
MCHC RBC AUTO-ENTMCNC: 33.6 G/DL (ref 31.4–35)
MCV RBC AUTO: 90.3 FL (ref 82–102)
MONOCYTES # BLD: 0.5 K/UL (ref 0.1–1.3)
MONOCYTES NFR BLD: 8 % (ref 4–12)
MRSA DNA SPEC QL NAA+PROBE: NOT DETECTED
NEUTS SEG # BLD: 4.9 K/UL (ref 1.7–8.2)
NEUTS SEG NFR BLD: 72 % (ref 43–78)
NITRITE UR QL STRIP.AUTO: NEGATIVE
NRBC # BLD: 0 K/UL (ref 0–0.2)
PH UR STRIP: 6 (ref 5–9)
PLATELET # BLD AUTO: 285 K/UL (ref 150–450)
PMV BLD AUTO: 9.5 FL (ref 9.4–12.3)
POTASSIUM SERPL-SCNC: 4.1 MMOL/L (ref 3.5–5.1)
PROT UR STRIP-MCNC: NEGATIVE MG/DL
RBC # BLD AUTO: 4.94 M/UL (ref 4.05–5.2)
RBC #/AREA URNS HPF: ABNORMAL /HPF
S AUREUS CPE NOSE QL NAA+PROBE: NOT DETECTED
SODIUM SERPL-SCNC: 138 MMOL/L (ref 136–146)
SP GR UR REFRACTOMETRY: 1.03 (ref 1–1.02)
UROBILINOGEN UR QL STRIP.AUTO: 1 EU/DL (ref 0.2–1)
WBC # BLD AUTO: 6.8 K/UL (ref 4.3–11.1)
WBC URNS QL MICRO: ABNORMAL /HPF

## 2024-03-28 PROCEDURE — 80048 BASIC METABOLIC PNL TOTAL CA: CPT

## 2024-03-28 PROCEDURE — 81001 URINALYSIS AUTO W/SCOPE: CPT

## 2024-03-28 PROCEDURE — 87641 MR-STAPH DNA AMP PROBE: CPT

## 2024-03-28 PROCEDURE — 80307 DRUG TEST PRSMV CHEM ANLYZR: CPT

## 2024-03-28 PROCEDURE — 85025 COMPLETE CBC W/AUTO DIFF WBC: CPT

## 2024-03-28 RX ORDER — NITROFURANTOIN MACROCRYSTALS 50 MG/1
50 CAPSULE ORAL DAILY PRN
COMMUNITY

## 2024-03-28 ASSESSMENT — PAIN SCALES - GENERAL: PAINLEVEL_OUTOF10: 5

## 2024-03-28 ASSESSMENT — PAIN DESCRIPTION - LOCATION: LOCATION: NECK

## 2024-03-28 NOTE — PERIOP NOTE
Labs within anesthesia guidelines, no follow-up required. Labs automatically routed to ordering provider via Epic documentation. Abnormal UA reviewed by Dr. Mueller.

## 2024-03-28 NOTE — PERIOP NOTE
Patient verified name, , and surgery as listed in [facility]. Patient provided medical/health information and PTA medications to the best of their ability.    TYPE  CASE: II  Orders per surgeon: were received  Labs per surgeon: cbc, bmp, ua, urine nicotine, MRSA  Labs per anesthesia protocol: T&S  EKG:  not required     MRSA/MSSA swab collected; pharmacy to review and dose antibiotic as appropriate.     Patient provided with and instructed on education handouts including Guide to Surgery, blood transfusions, pain management, and hand hygiene for the family and community, and [facility] brochure.     Road to Recovery Spine surgery patient guide given. Instructed on incentive spirometry with return demonstration. Patient viewed spine prehab video.     Hibiclens and instructions given per hospital policy.    Original medication prescription bottles were visualized during patient appointment.     Patient teach back successful and patient demonstrates knowledge of instruction.

## 2024-03-28 NOTE — PERIOP NOTE
PLEASE CONTINUE TAKING ALL PRESCRIPTION MEDICATIONS UP TO THE DAY OF SURGERY UNLESS OTHERWISE DIRECTED BELOW. You may take Tylenol, allergy,  and/or indigestion medications.     TAKE ONLY THESE MEDICATIONS ON THE DAY OF SURGERY    gabapentin (Neurontin)  norethindrone   Sertraline (Zoloft)   Vibegron (Gemtesa)        DISCONTINUE all vitamins and supplements 7 days prior to surgery. DISCONTINUE Non-Steroidal Anti-Inflammatory (NSAIDS) such as Advil and Aleve 5 days prior to surgery.     Home Medications to Hold- please continue all other medications except these.    none        Comments      On the day before surgery please take 2 Tylenol in the morning and then again before bed. You may use either regular or extra strength.           Please do not bring home medications with you on the day of surgery unless otherwise directed by your nurse.  If you are instructed to bring home medications, please give them to your nurse as they will be administered by the nursing staff.    If you have any questions, please call [facility] [phone].    A copy of this note was provided to the patient for reference.

## 2024-03-29 LAB
COMMENT:: NORMAL
COTININE UR QL SCN: NEGATIVE NG/ML

## 2024-03-31 NOTE — PROGRESS NOTES
Vaginal atrophy    Neuropathic pain of both feet    Spastic gait    History of fusion of cervical spine    Myelomalacia (HCC)       Surgery Pre-Assessment Recommendations:           No interventions required at this time.     Signed By: Roshni Ruiz, APRN - CNP-C    March 31, 2024

## 2024-04-01 NOTE — PROGRESS NOTES
NICOTINE AND METABOLITES, URINE  Order: [id number]  Status: Final result       Visible to patient: Yes (seen)       Next appt: 04/08/2024 at 10:00 AM in Neurosurgery (Suzy Garcia, APRN - CNP)    0 Result Notes       Component  Ref Range & Units 3/28/24 0806 Resulting Agency   Cotinine Screen, Ur  Brjkzo=705 ng/mL Negative [facility] RT[facility]   Comment:    Comment [facility]   Comment: (NOTE)  This analysis is performed by immunoassay. Positive findings are  unconfirmed analytical test results; if results do not support  expected clinical finding, confirmation by an alternate methodology  is recommended. Patient metabolic variables, specific drug chemistry,  and specimen characteristics can affect test outcome.  Technical consultation is available at [email or web address], or  call toll free [phone].  Performed At: [facility]  [address], [place] [id number]  Javier Soto MD Ph:[phone]  Performed At:  [facility]  [address] [place], [place] [id number]  Petr Whaley PhD Ph:[phone]              Specimen Collected: 03/28/24 08:06 EDT Last Resulted: 03/29/24 18:35 EDT

## 2024-04-02 ENCOUNTER — TELEPHONE (OUTPATIENT)
Dept: NEUROSURGERY | Age: 54
End: 2024-04-02

## 2024-04-02 ENCOUNTER — OFFICE VISIT (OUTPATIENT)
Dept: UROLOGY | Age: 54
End: 2024-04-02
Payer: COMMERCIAL

## 2024-04-02 DIAGNOSIS — N39.0 RECURRENT UTI: Primary | ICD-10-CM

## 2024-04-02 LAB
BILIRUBIN, URINE, POC: NEGATIVE
BLOOD URINE, POC: NORMAL
GLUCOSE URINE, POC: NEGATIVE
KETONES, URINE, POC: NEGATIVE
LEUKOCYTE ESTERASE, URINE, POC: NEGATIVE
NITRITE, URINE, POC: NEGATIVE
PH, URINE, POC: 6.5 (ref 4.6–8)
PROTEIN,URINE, POC: NEGATIVE
SPECIFIC GRAVITY, URINE, POC: 1.01 (ref 1–1.03)
URINALYSIS CLARITY, POC: NORMAL
URINALYSIS COLOR, POC: NORMAL
UROBILINOGEN, POC: NORMAL

## 2024-04-02 PROCEDURE — 81003 URINALYSIS AUTO W/O SCOPE: CPT | Performed by: NURSE PRACTITIONER

## 2024-04-02 NOTE — TELEPHONE ENCOUNTER
Spoke to patient at 8:21- she is headed for a repeat UA today with urology with Clementine Price- advised patient of abnormal PAT results and possible treatment prior to surgery

## 2024-04-02 NOTE — TELEPHONE ENCOUNTER
----- Message from Kervin Mueller MD sent at 3/28/2024 10:13 AM EDT -----  Radha     UA looks concerning for infection recommend urine culture and contact PCP for appropriate pre-operative UTI treatment.     Kervin   ----- Message -----  From: Snehal Mancini Incoming Lab For Copath Pdfs  Sent: 3/28/2024   8:42 AM EDT  To: Kervin Mueller MD

## 2024-04-02 NOTE — PROGRESS NOTES
Reason for collection: Frequency; Burning  Patient #: [id number]  Pharmacy: [facility]        UA - Dipstick  Results for orders placed or performed in visit on 04/02/24   AMB POC URINALYSIS DIP STICK AUTO W/O MICRO   Result Value Ref Range    Color (UA POC)      Clarity (UA POC)      Glucose, Urine, POC Negative     Bilirubin, Urine, POC Negative     KETONES, Urine, POC Negative     Specific Gravity, Urine, POC 1.015 1.001 - 1.035    Blood (UA POC) Trace-lysed     pH, Urine, POC 6.5 4.6 - 8.0    Protein, Urine, POC Negative     Urobilinogen, POC 0.2 mg/dL     Nitrite, Urine, POC Negative     Leukocyte Esterase, Urine, POC Negative    Results for orders placed or performed in visit on 05/11/23   AMB POC URINALYSIS DIP STICK AUTO W/O MICRO   Result Value Ref Range    Color, Urine, POC Yellow     Clarity, Urine, POC Clear     Glucose, Urine, POC Negative Negative    Bilirubin, Urine, POC Negative Negative    Ketones, Urine, POC Negative Negative    Specific Gravity, Urine, POC 1.025 1.001 - 1.035    Blood, Urine, POC Negative Negative    pH, Urine, POC 5.5 4.6 - 8.0    Protein, Urine, POC Negative Negative    Urobilinogen, POC Normal     Nitrite, Urine, POC Positive Negative    Leukocyte Esterase, Urine, POC Trace Negative       UA - Micro  WBC - 0  RBC - 0  Bacteria - 0  Epith - 0      Requested Prescriptions      No prescriptions requested or ordered in this encounter     Plan    Diagnosis Orders   1. Recurrent UTI  Culture, Urine    AMB POC URINALYSIS DIP STICK AUTO W/O MICRO          Culture only. Urine normal today.    Clementine Price, APRN - CNP

## 2024-04-03 ENCOUNTER — PATIENT MESSAGE (OUTPATIENT)
Dept: NEUROSURGERY | Age: 54
End: 2024-04-03

## 2024-04-03 NOTE — TELEPHONE ENCOUNTER
From: Tiffanie Phillips  To: Dr. Kervin Mueller  Sent: 4/3/2024 11:38 AM EDT  Subject: URINE TEST    My new urine test came back ok but she's going to send off for culture to make sure.

## 2024-04-04 LAB
BACTERIA SPEC CULT: NORMAL
BACTERIA SPEC CULT: NORMAL
SERVICE CMNT-IMP: NORMAL

## 2024-04-08 ENCOUNTER — OFFICE VISIT (OUTPATIENT)
Dept: NEUROSURGERY | Age: 54
End: 2024-04-08
Payer: COMMERCIAL

## 2024-04-08 VITALS
BODY MASS INDEX: 27.58 KG/M2 | DIASTOLIC BLOOD PRESSURE: 78 MMHG | WEIGHT: 171.6 LBS | HEART RATE: 68 BPM | SYSTOLIC BLOOD PRESSURE: 154 MMHG | TEMPERATURE: 97.9 F | OXYGEN SATURATION: 98 % | HEIGHT: 66 IN

## 2024-04-08 DIAGNOSIS — M48.02 CERVICAL SPINAL STENOSIS: ICD-10-CM

## 2024-04-08 DIAGNOSIS — G95.9 CERVICAL MYELOPATHY (HCC): Primary | ICD-10-CM

## 2024-04-08 PROCEDURE — 99214 OFFICE O/P EST MOD 30 MIN: CPT | Performed by: NURSE PRACTITIONER

## 2024-04-08 RX ORDER — SODIUM CHLORIDE 0.9 % (FLUSH) 0.9 %
5-40 SYRINGE (ML) INJECTION EVERY 12 HOURS SCHEDULED
OUTPATIENT
Start: 2024-04-08

## 2024-04-08 RX ORDER — SODIUM CHLORIDE 0.9 % (FLUSH) 0.9 %
5-40 SYRINGE (ML) INJECTION PRN
OUTPATIENT
Start: 2024-04-08

## 2024-04-08 RX ORDER — SODIUM CHLORIDE 9 MG/ML
INJECTION, SOLUTION INTRAVENOUS PRN
OUTPATIENT
Start: 2024-04-08

## 2024-04-08 NOTE — PROGRESS NOTES
[facility] NOTE:   History of Present Illness:    CC: Presurgical evaluation    Tiffanie Phillips is a 54 y.o. female here for presurgical evaluation.  Patient is to undergo a C4-7 posterior cervical laminectomy and instrumented fixation and lateral mass arthrodesis on 4-.  Patient states that several years ago she had a C5-6 anterior cervical discectomy with Dr. Boone and since that time she has had left leg and foot numbness as well as a jumpy right leg and foot with some intermittent right foot drop.  Patient states she has a hard time ambulating and even driving a car because her leg is constantly jumping.  Patient states she has had no recent medical history change and is not a diabetic and does not take blood thinners.  Patient recently had a urinalysis done with a follow-up culture that revealed no microorganisms needing to be treated at this time.  [facility] get an Aspen c-collar that she will bring with her to surgery.  This collar will help provide stability and promote healing during the postoperative setting.  The cervical CT myelogram reveals a previous C5-6 anterior cervical discectomy and fusion; C5-6 disc osteophyte complex causing moderate thecal sac narrowing and some central canal stenosis.  There is also moderate right foraminal narrowing at this location.    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

## 2024-04-08 NOTE — H&P (VIEW-ONLY)
vomiting, constipation, or frequent diarrhea     Genitourinary: No frequent urination, burning or painful urination, or blood in urine     Musculoskeletal:   POS joint pain, stiffness/swelling, POS weakness of muscles, or POSmuscle pain/cramp     Integument:   No rash/itching     Neurological:   Right leg spacticity and left leg numbness       Physical Exam:    General: No acute distress  Head normocephalic and atraumatic  Mood and affect appropriate  CV: Regular rate   Resp: No increased work of breathing  Skin: warm and dry   Awake, alert, and oriented   Speech fluent  Eyes open spontaneously   Face symmetric and tongue midline on protrusion  Sternocleidomastoid and trapezius 5/5  No mid-line cervical, thoracic, or lumbar tenderness to palpation   Patient with strength exam as follows:        Lower Extremities:      Hip Flex 5 5    Quads  5 5    Hamstrings 5 5    Dorsiflex 5 +4    Plantarflex 5 5    EHL  5 5  Sensation intact to light touch and pin-prick   DTR 2+ in the bilateral upper extremities, 3+ patellar responses, 4 beats of ankle clonus on the left and 2 beats on the right   Gait spastic with a right foot drop    Assessment & Plan:  Tiffanie Phillips is a 54 y.o. female who presents for presurgical evaluation.  Patient is to undergo C4-7 posterior cervical laminectomy with instrumented fixation and lateral mass arthrodesis on 4-.  Patient is to go to [facility] to get fitted for an Aspen collar that she will bring with her to the date of surgery.  Patient's cervical CT myelogram correlates with the exam findings at this time.  Patient will keep her prescheduled follow-up appointments after surgery we will see her the day of surgery.  No diagnosis found.    Notes are transcribed with [employer], a medical voice recording dictation service, and may contain minor errors.    Suzy Garcia NP  [facility]  [facility]

## 2024-04-15 ENCOUNTER — ANESTHESIA EVENT (OUTPATIENT)
Dept: SURGERY | Age: 54
End: 2024-04-15
Payer: COMMERCIAL

## 2024-04-16 ENCOUNTER — APPOINTMENT (OUTPATIENT)
Dept: GENERAL RADIOLOGY | Age: 54
End: 2024-04-16
Attending: NEUROLOGICAL SURGERY
Payer: COMMERCIAL

## 2024-04-16 ENCOUNTER — TELEPHONE (OUTPATIENT)
Dept: NEUROSURGERY | Age: 54
End: 2024-04-16

## 2024-04-16 ENCOUNTER — HOSPITAL ENCOUNTER (INPATIENT)
Age: 54
LOS: 4 days | Discharge: HOME | End: 2024-04-20
Attending: NEUROLOGICAL SURGERY | Admitting: NEUROLOGICAL SURGERY
Payer: COMMERCIAL

## 2024-04-16 ENCOUNTER — ANESTHESIA (OUTPATIENT)
Dept: SURGERY | Age: 54
End: 2024-04-16
Payer: COMMERCIAL

## 2024-04-16 DIAGNOSIS — M48.02 CERVICAL SPINAL STENOSIS: Primary | ICD-10-CM

## 2024-04-16 DIAGNOSIS — Z98.1 S/P CERVICAL SPINAL FUSION: ICD-10-CM

## 2024-04-16 DIAGNOSIS — G89.18 POST-OPERATIVE PAIN: ICD-10-CM

## 2024-04-16 PROCEDURE — 6360000002 HC RX W HCPCS: Performed by: ANESTHESIOLOGY

## 2024-04-16 PROCEDURE — C1713 ANCHOR/SCREW BN/BN,TIS/BN: HCPCS | Performed by: NEUROLOGICAL SURGERY

## 2024-04-16 PROCEDURE — 2709999900 HC NON-CHARGEABLE SUPPLY: Performed by: NEUROLOGICAL SURGERY

## 2024-04-16 PROCEDURE — 6360000002 HC RX W HCPCS: Performed by: NURSE PRACTITIONER

## 2024-04-16 PROCEDURE — 64486 TAP BLOCK UNIL BY INJECTION: CPT | Performed by: ANESTHESIOLOGY

## 2024-04-16 PROCEDURE — 3600000004 HC SURGERY LEVEL 4 BASE: Performed by: NEUROLOGICAL SURGERY

## 2024-04-16 PROCEDURE — 7100000001 HC PACU RECOVERY - ADDTL 15 MIN: Performed by: NEUROLOGICAL SURGERY

## 2024-04-16 PROCEDURE — 2580000003 HC RX 258: Performed by: ANESTHESIOLOGY

## 2024-04-16 PROCEDURE — 2580000003 HC RX 258: Performed by: NEUROLOGICAL SURGERY

## 2024-04-16 PROCEDURE — 2580000003 HC RX 258: Performed by: REGISTERED NURSE

## 2024-04-16 PROCEDURE — 6370000000 HC RX 637 (ALT 250 FOR IP): Performed by: ANESTHESIOLOGY

## 2024-04-16 PROCEDURE — 7100000000 HC PACU RECOVERY - FIRST 15 MIN: Performed by: NEUROLOGICAL SURGERY

## 2024-04-16 PROCEDURE — 86850 RBC ANTIBODY SCREEN: CPT

## 2024-04-16 PROCEDURE — 86901 BLOOD TYPING SEROLOGIC RH(D): CPT

## 2024-04-16 PROCEDURE — 2580000003 HC RX 258: Performed by: NURSE PRACTITIONER

## 2024-04-16 PROCEDURE — 2500000003 HC RX 250 WO HCPCS: Performed by: ANESTHESIOLOGY

## 2024-04-16 PROCEDURE — 3700000001 HC ADD 15 MINUTES (ANESTHESIA): Performed by: NEUROLOGICAL SURGERY

## 2024-04-16 PROCEDURE — 6370000000 HC RX 637 (ALT 250 FOR IP): Performed by: NEUROLOGICAL SURGERY

## 2024-04-16 PROCEDURE — 6360000002 HC RX W HCPCS: Performed by: NEUROLOGICAL SURGERY

## 2024-04-16 PROCEDURE — 2500000003 HC RX 250 WO HCPCS: Performed by: REGISTERED NURSE

## 2024-04-16 PROCEDURE — 0RG2071 FUSION OF 2 OR MORE CERVICAL VERTEBRAL JOINTS WITH AUTOLOGOUS TISSUE SUBSTITUTE, POSTERIOR APPROACH, POSTERIOR COLUMN, OPEN APPROACH: ICD-10-PCS | Performed by: NEUROLOGICAL SURGERY

## 2024-04-16 PROCEDURE — 01N10ZZ RELEASE CERVICAL NERVE, OPEN APPROACH: ICD-10-PCS | Performed by: NEUROLOGICAL SURGERY

## 2024-04-16 PROCEDURE — 4A1104G MONITORING OF PERIPHERAL NERVOUS ELECTRICAL ACTIVITY, INTRAOPERATIVE, OPEN APPROACH: ICD-10-PCS | Performed by: NEUROLOGICAL SURGERY

## 2024-04-16 PROCEDURE — 2720000010 HC SURG SUPPLY STERILE: Performed by: NEUROLOGICAL SURGERY

## 2024-04-16 PROCEDURE — 81025 URINE PREGNANCY TEST: CPT

## 2024-04-16 PROCEDURE — 2500000003 HC RX 250 WO HCPCS: Performed by: NEUROLOGICAL SURGERY

## 2024-04-16 PROCEDURE — 72040 X-RAY EXAM NECK SPINE 2-3 VW: CPT

## 2024-04-16 PROCEDURE — 1100000000 HC RM PRIVATE

## 2024-04-16 PROCEDURE — 6360000002 HC RX W HCPCS: Performed by: REGISTERED NURSE

## 2024-04-16 PROCEDURE — 3600000014 HC SURGERY LEVEL 4 ADDTL 15MIN: Performed by: NEUROLOGICAL SURGERY

## 2024-04-16 PROCEDURE — 3700000000 HC ANESTHESIA ATTENDED CARE: Performed by: NEUROLOGICAL SURGERY

## 2024-04-16 PROCEDURE — 86900 BLOOD TYPING SEROLOGIC ABO: CPT

## 2024-04-16 DEVICE — GRAFT BNE MED: Type: IMPLANTABLE DEVICE | Site: POSTERIOR CERVICAL | Status: FUNCTIONAL

## 2024-04-16 DEVICE — ROD SPNL L65MM DIA3.5MM CNTOUR OCCIPITOCERVICOTHORACIC: Type: IMPLANTABLE DEVICE | Site: POSTERIOR CERVICAL | Status: FUNCTIONAL

## 2024-04-16 DEVICE — BIO DBM PLUS PUTTY (WITH CANCELLOUS)
Type: IMPLANTABLE DEVICE | Site: POSTERIOR CERVICAL | Status: FUNCTIONAL
Brand: BIO DBM

## 2024-04-16 DEVICE — SCREW SPINE SET LKING CAP: Type: IMPLANTABLE DEVICE | Site: POSTERIOR CERVICAL | Status: FUNCTIONAL

## 2024-04-16 RX ORDER — FENTANYL CITRATE 50 UG/ML
100 INJECTION, SOLUTION INTRAMUSCULAR; INTRAVENOUS
Status: DISCONTINUED | OUTPATIENT
Start: 2024-04-16 | End: 2024-04-16 | Stop reason: HOSPADM

## 2024-04-16 RX ORDER — ONDANSETRON 2 MG/ML
4 INJECTION INTRAMUSCULAR; INTRAVENOUS
Status: DISCONTINUED | OUTPATIENT
Start: 2024-04-16 | End: 2024-04-16 | Stop reason: HOSPADM

## 2024-04-16 RX ORDER — ENEMA 19; 7 G/133ML; G/133ML
1 ENEMA RECTAL DAILY PRN
Status: DISCONTINUED | OUTPATIENT
Start: 2024-04-16 | End: 2024-04-20 | Stop reason: HOSPADM

## 2024-04-16 RX ORDER — HYDROMORPHONE HYDROCHLORIDE 2 MG/ML
INJECTION, SOLUTION INTRAMUSCULAR; INTRAVENOUS; SUBCUTANEOUS PRN
Status: DISCONTINUED | OUTPATIENT
Start: 2024-04-16 | End: 2024-04-16 | Stop reason: SDUPTHER

## 2024-04-16 RX ORDER — SODIUM CHLORIDE 9 MG/ML
INJECTION, SOLUTION INTRAVENOUS PRN
Status: DISCONTINUED | OUTPATIENT
Start: 2024-04-16 | End: 2024-04-20 | Stop reason: HOSPADM

## 2024-04-16 RX ORDER — HYDROMORPHONE HYDROCHLORIDE 1 MG/ML
0.5 INJECTION, SOLUTION INTRAMUSCULAR; INTRAVENOUS; SUBCUTANEOUS
Status: DISCONTINUED | OUTPATIENT
Start: 2024-04-16 | End: 2024-04-20 | Stop reason: HOSPADM

## 2024-04-16 RX ORDER — ACETAMINOPHEN 325 MG/1
650 TABLET ORAL EVERY 4 HOURS PRN
Status: DISCONTINUED | OUTPATIENT
Start: 2024-04-16 | End: 2024-04-20 | Stop reason: HOSPADM

## 2024-04-16 RX ORDER — BUPIVACAINE HYDROCHLORIDE AND EPINEPHRINE 2.5; 5 MG/ML; UG/ML
INJECTION, SOLUTION EPIDURAL; INFILTRATION; INTRACAUDAL; PERINEURAL
Status: DISCONTINUED | OUTPATIENT
Start: 2024-04-16 | End: 2024-04-16

## 2024-04-16 RX ORDER — SODIUM CHLORIDE 0.9 % (FLUSH) 0.9 %
5-40 SYRINGE (ML) INJECTION PRN
Status: DISCONTINUED | OUTPATIENT
Start: 2024-04-16 | End: 2024-04-20 | Stop reason: HOSPADM

## 2024-04-16 RX ORDER — DEXAMETHASONE SODIUM PHOSPHATE 10 MG/ML
INJECTION INTRAMUSCULAR; INTRAVENOUS PRN
Status: DISCONTINUED | OUTPATIENT
Start: 2024-04-16 | End: 2024-04-16 | Stop reason: SDUPTHER

## 2024-04-16 RX ORDER — NALOXONE HYDROCHLORIDE 0.4 MG/ML
INJECTION, SOLUTION INTRAMUSCULAR; INTRAVENOUS; SUBCUTANEOUS PRN
Status: DISCONTINUED | OUTPATIENT
Start: 2024-04-16 | End: 2024-04-16 | Stop reason: HOSPADM

## 2024-04-16 RX ORDER — SODIUM CHLORIDE, SODIUM LACTATE, POTASSIUM CHLORIDE, CALCIUM CHLORIDE 600; 310; 30; 20 MG/100ML; MG/100ML; MG/100ML; MG/100ML
INJECTION, SOLUTION INTRAVENOUS CONTINUOUS PRN
Status: DISCONTINUED | OUTPATIENT
Start: 2024-04-16 | End: 2024-04-16 | Stop reason: SDUPTHER

## 2024-04-16 RX ORDER — GLYCOPYRROLATE 0.2 MG/ML
INJECTION INTRAMUSCULAR; INTRAVENOUS PRN
Status: DISCONTINUED | OUTPATIENT
Start: 2024-04-16 | End: 2024-04-16 | Stop reason: SDUPTHER

## 2024-04-16 RX ORDER — CYCLOBENZAPRINE HCL 10 MG
10 TABLET ORAL 3 TIMES DAILY PRN
Status: DISCONTINUED | OUTPATIENT
Start: 2024-04-16 | End: 2024-04-20 | Stop reason: HOSPADM

## 2024-04-16 RX ORDER — SODIUM CHLORIDE 0.9 % (FLUSH) 0.9 %
5-40 SYRINGE (ML) INJECTION EVERY 12 HOURS SCHEDULED
Status: DISCONTINUED | OUTPATIENT
Start: 2024-04-16 | End: 2024-04-16 | Stop reason: HOSPADM

## 2024-04-16 RX ORDER — BUPIVACAINE HYDROCHLORIDE AND EPINEPHRINE 2.5; 5 MG/ML; UG/ML
INJECTION, SOLUTION EPIDURAL; INFILTRATION; INTRACAUDAL; PERINEURAL
Status: COMPLETED | OUTPATIENT
Start: 2024-04-16 | End: 2024-04-16

## 2024-04-16 RX ORDER — SCOLOPAMINE TRANSDERMAL SYSTEM 1 MG/1
1 PATCH, EXTENDED RELEASE TRANSDERMAL
Status: DISCONTINUED | OUTPATIENT
Start: 2024-04-16 | End: 2024-04-16

## 2024-04-16 RX ORDER — SODIUM CHLORIDE 9 MG/ML
INJECTION, SOLUTION INTRAVENOUS PRN
Status: DISCONTINUED | OUTPATIENT
Start: 2024-04-16 | End: 2024-04-16 | Stop reason: HOSPADM

## 2024-04-16 RX ORDER — DIPHENHYDRAMINE HYDROCHLORIDE 50 MG/ML
25 INJECTION INTRAMUSCULAR; INTRAVENOUS EVERY 6 HOURS PRN
Status: DISCONTINUED | OUTPATIENT
Start: 2024-04-16 | End: 2024-04-20 | Stop reason: HOSPADM

## 2024-04-16 RX ORDER — LIDOCAINE HYDROCHLORIDE 20 MG/ML
INJECTION, SOLUTION EPIDURAL; INFILTRATION; INTRACAUDAL; PERINEURAL PRN
Status: DISCONTINUED | OUTPATIENT
Start: 2024-04-16 | End: 2024-04-16 | Stop reason: SDUPTHER

## 2024-04-16 RX ORDER — HYDROMORPHONE HYDROCHLORIDE 2 MG/ML
0.5 INJECTION, SOLUTION INTRAMUSCULAR; INTRAVENOUS; SUBCUTANEOUS EVERY 10 MIN PRN
Status: COMPLETED | OUTPATIENT
Start: 2024-04-16 | End: 2024-04-16

## 2024-04-16 RX ORDER — SENNA AND DOCUSATE SODIUM 50; 8.6 MG/1; MG/1
1 TABLET, FILM COATED ORAL 2 TIMES DAILY
Status: DISCONTINUED | OUTPATIENT
Start: 2024-04-16 | End: 2024-04-20 | Stop reason: HOSPADM

## 2024-04-16 RX ORDER — OXYCODONE HYDROCHLORIDE 5 MG/1
5 TABLET ORAL EVERY 4 HOURS PRN
Status: DISCONTINUED | OUTPATIENT
Start: 2024-04-16 | End: 2024-04-20 | Stop reason: HOSPADM

## 2024-04-16 RX ORDER — BISACODYL 10 MG
10 SUPPOSITORY, RECTAL RECTAL DAILY PRN
Status: DISCONTINUED | OUTPATIENT
Start: 2024-04-16 | End: 2024-04-20 | Stop reason: HOSPADM

## 2024-04-16 RX ORDER — EPHEDRINE SULFATE 5 MG/ML
INJECTION INTRAVENOUS PRN
Status: DISCONTINUED | OUTPATIENT
Start: 2024-04-16 | End: 2024-04-16 | Stop reason: SDUPTHER

## 2024-04-16 RX ORDER — METOCLOPRAMIDE HYDROCHLORIDE 5 MG/ML
10 INJECTION INTRAMUSCULAR; INTRAVENOUS
Status: DISCONTINUED | OUTPATIENT
Start: 2024-04-16 | End: 2024-04-16 | Stop reason: HOSPADM

## 2024-04-16 RX ORDER — PROPOFOL 10 MG/ML
INJECTION, EMULSION INTRAVENOUS PRN
Status: DISCONTINUED | OUTPATIENT
Start: 2024-04-16 | End: 2024-04-16 | Stop reason: SDUPTHER

## 2024-04-16 RX ORDER — GABAPENTIN 300 MG/1
300 CAPSULE ORAL 3 TIMES DAILY
Status: DISCONTINUED | OUTPATIENT
Start: 2024-04-16 | End: 2024-04-20 | Stop reason: HOSPADM

## 2024-04-16 RX ORDER — MIDAZOLAM HYDROCHLORIDE 1 MG/ML
INJECTION INTRAMUSCULAR; INTRAVENOUS PRN
Status: DISCONTINUED | OUTPATIENT
Start: 2024-04-16 | End: 2024-04-16 | Stop reason: SDUPTHER

## 2024-04-16 RX ORDER — VANCOMYCIN HYDROCHLORIDE 1 G/20ML
INJECTION, POWDER, LYOPHILIZED, FOR SOLUTION INTRAVENOUS PRN
Status: DISCONTINUED | OUTPATIENT
Start: 2024-04-16 | End: 2024-04-16 | Stop reason: ALTCHOICE

## 2024-04-16 RX ORDER — DEXMEDETOMIDINE HYDROCHLORIDE 4 UG/ML
INJECTION, SOLUTION INTRAVENOUS CONTINUOUS PRN
Status: DISCONTINUED | OUTPATIENT
Start: 2024-04-16 | End: 2024-04-16 | Stop reason: SDUPTHER

## 2024-04-16 RX ORDER — BUPIVACAINE HYDROCHLORIDE AND EPINEPHRINE 5; 5 MG/ML; UG/ML
INJECTION, SOLUTION EPIDURAL; INTRACAUDAL; PERINEURAL PRN
Status: DISCONTINUED | OUTPATIENT
Start: 2024-04-16 | End: 2024-04-16 | Stop reason: ALTCHOICE

## 2024-04-16 RX ORDER — SODIUM CHLORIDE 9 MG/ML
INJECTION, SOLUTION INTRAVENOUS CONTINUOUS
Status: DISCONTINUED | OUTPATIENT
Start: 2024-04-16 | End: 2024-04-20 | Stop reason: HOSPADM

## 2024-04-16 RX ORDER — DIPHENHYDRAMINE HYDROCHLORIDE 50 MG/ML
12.5 INJECTION INTRAMUSCULAR; INTRAVENOUS
Status: DISCONTINUED | OUTPATIENT
Start: 2024-04-16 | End: 2024-04-16 | Stop reason: HOSPADM

## 2024-04-16 RX ORDER — SODIUM CHLORIDE 0.9 % (FLUSH) 0.9 %
5-40 SYRINGE (ML) INJECTION PRN
Status: DISCONTINUED | OUTPATIENT
Start: 2024-04-16 | End: 2024-04-16 | Stop reason: HOSPADM

## 2024-04-16 RX ORDER — MIDAZOLAM HYDROCHLORIDE 2 MG/2ML
2 INJECTION, SOLUTION INTRAMUSCULAR; INTRAVENOUS
Status: DISCONTINUED | OUTPATIENT
Start: 2024-04-16 | End: 2024-04-16 | Stop reason: HOSPADM

## 2024-04-16 RX ORDER — SODIUM CHLORIDE, SODIUM LACTATE, POTASSIUM CHLORIDE, CALCIUM CHLORIDE 600; 310; 30; 20 MG/100ML; MG/100ML; MG/100ML; MG/100ML
INJECTION, SOLUTION INTRAVENOUS CONTINUOUS
Status: DISCONTINUED | OUTPATIENT
Start: 2024-04-16 | End: 2024-04-16 | Stop reason: HOSPADM

## 2024-04-16 RX ORDER — ONDANSETRON 4 MG/1
4 TABLET, ORALLY DISINTEGRATING ORAL EVERY 8 HOURS PRN
Status: DISCONTINUED | OUTPATIENT
Start: 2024-04-16 | End: 2024-04-20 | Stop reason: HOSPADM

## 2024-04-16 RX ORDER — FENTANYL CITRATE 50 UG/ML
INJECTION, SOLUTION INTRAMUSCULAR; INTRAVENOUS PRN
Status: DISCONTINUED | OUTPATIENT
Start: 2024-04-16 | End: 2024-04-16 | Stop reason: SDUPTHER

## 2024-04-16 RX ORDER — SODIUM CHLORIDE 0.9 % (FLUSH) 0.9 %
5-40 SYRINGE (ML) INJECTION EVERY 12 HOURS SCHEDULED
Status: DISCONTINUED | OUTPATIENT
Start: 2024-04-16 | End: 2024-04-20 | Stop reason: HOSPADM

## 2024-04-16 RX ORDER — DIPHENHYDRAMINE HCL 25 MG
25 CAPSULE ORAL EVERY 6 HOURS PRN
Status: DISCONTINUED | OUTPATIENT
Start: 2024-04-16 | End: 2024-04-20 | Stop reason: HOSPADM

## 2024-04-16 RX ORDER — ROCURONIUM BROMIDE 10 MG/ML
INJECTION, SOLUTION INTRAVENOUS PRN
Status: DISCONTINUED | OUTPATIENT
Start: 2024-04-16 | End: 2024-04-16 | Stop reason: SDUPTHER

## 2024-04-16 RX ORDER — OXYCODONE HYDROCHLORIDE 5 MG/1
5 TABLET ORAL
Status: COMPLETED | OUTPATIENT
Start: 2024-04-16 | End: 2024-04-16

## 2024-04-16 RX ORDER — KETAMINE HYDROCHLORIDE 50 MG/ML
INJECTION, SOLUTION INTRAMUSCULAR; INTRAVENOUS PRN
Status: DISCONTINUED | OUTPATIENT
Start: 2024-04-16 | End: 2024-04-16 | Stop reason: SDUPTHER

## 2024-04-16 RX ORDER — LIDOCAINE HYDROCHLORIDE ANHYDROUS AND DEXTROSE MONOHYDRATE 5; 400 G/100ML; MG/100ML
INJECTION, SOLUTION INTRAVENOUS CONTINUOUS PRN
Status: DISCONTINUED | OUTPATIENT
Start: 2024-04-16 | End: 2024-04-16 | Stop reason: SDUPTHER

## 2024-04-16 RX ORDER — OXYCODONE HYDROCHLORIDE 5 MG/1
10 TABLET ORAL EVERY 4 HOURS PRN
Status: DISCONTINUED | OUTPATIENT
Start: 2024-04-16 | End: 2024-04-20 | Stop reason: HOSPADM

## 2024-04-16 RX ORDER — SUCCINYLCHOLINE/SOD CL,ISO/PF 200MG/10ML
SYRINGE (ML) INTRAVENOUS PRN
Status: DISCONTINUED | OUTPATIENT
Start: 2024-04-16 | End: 2024-04-16 | Stop reason: SDUPTHER

## 2024-04-16 RX ORDER — ONDANSETRON 2 MG/ML
INJECTION INTRAMUSCULAR; INTRAVENOUS PRN
Status: DISCONTINUED | OUTPATIENT
Start: 2024-04-16 | End: 2024-04-16 | Stop reason: SDUPTHER

## 2024-04-16 RX ORDER — NEOSTIGMINE METHYLSULFATE 1 MG/ML
INJECTION, SOLUTION INTRAVENOUS PRN
Status: DISCONTINUED | OUTPATIENT
Start: 2024-04-16 | End: 2024-04-16 | Stop reason: SDUPTHER

## 2024-04-16 RX ORDER — FENTANYL CITRATE 50 UG/ML
25 INJECTION, SOLUTION INTRAMUSCULAR; INTRAVENOUS EVERY 5 MIN PRN
Status: DISCONTINUED | OUTPATIENT
Start: 2024-04-16 | End: 2024-04-16 | Stop reason: HOSPADM

## 2024-04-16 RX ORDER — POLYETHYLENE GLYCOL 3350 17 G/17G
17 POWDER, FOR SOLUTION ORAL DAILY
Status: DISCONTINUED | OUTPATIENT
Start: 2024-04-16 | End: 2024-04-20 | Stop reason: HOSPADM

## 2024-04-16 RX ORDER — HYDROMORPHONE HYDROCHLORIDE 1 MG/ML
0.25 INJECTION, SOLUTION INTRAMUSCULAR; INTRAVENOUS; SUBCUTANEOUS
Status: DISCONTINUED | OUTPATIENT
Start: 2024-04-16 | End: 2024-04-20 | Stop reason: HOSPADM

## 2024-04-16 RX ORDER — ONDANSETRON 2 MG/ML
4 INJECTION INTRAMUSCULAR; INTRAVENOUS EVERY 6 HOURS PRN
Status: DISCONTINUED | OUTPATIENT
Start: 2024-04-16 | End: 2024-04-20 | Stop reason: HOSPADM

## 2024-04-16 RX ADMIN — PHENYLEPHRINE HYDROCHLORIDE 100 MCG: 0.1 INJECTION, SOLUTION INTRAVENOUS at 10:53

## 2024-04-16 RX ADMIN — PHENYLEPHRINE HYDROCHLORIDE 100 MCG: 0.1 INJECTION, SOLUTION INTRAVENOUS at 08:18

## 2024-04-16 RX ADMIN — VANCOMYCIN HYDROCHLORIDE 1000 MG: 1 INJECTION, POWDER, LYOPHILIZED, FOR SOLUTION INTRAVENOUS at 07:40

## 2024-04-16 RX ADMIN — OXYCODONE 10 MG: 5 TABLET ORAL at 16:49

## 2024-04-16 RX ADMIN — PHENYLEPHRINE HYDROCHLORIDE 100 MCG: 0.1 INJECTION, SOLUTION INTRAVENOUS at 09:57

## 2024-04-16 RX ADMIN — CEFEPIME 2000 MG: 2 INJECTION, POWDER, FOR SOLUTION INTRAVENOUS at 08:18

## 2024-04-16 RX ADMIN — PHENYLEPHRINE HYDROCHLORIDE 50 MCG: 0.1 INJECTION, SOLUTION INTRAVENOUS at 10:37

## 2024-04-16 RX ADMIN — PHENYLEPHRINE HYDROCHLORIDE 50 MCG: 0.1 INJECTION, SOLUTION INTRAVENOUS at 10:06

## 2024-04-16 RX ADMIN — SODIUM CHLORIDE: 9 INJECTION, SOLUTION INTRAVENOUS at 15:02

## 2024-04-16 RX ADMIN — VANCOMYCIN HYDROCHLORIDE 1500 MG: 10 INJECTION, POWDER, LYOPHILIZED, FOR SOLUTION INTRAVENOUS at 21:21

## 2024-04-16 RX ADMIN — PROPOFOL 200 MG: 10 INJECTION, EMULSION INTRAVENOUS at 07:33

## 2024-04-16 RX ADMIN — GLYCOPYRROLATE 0.4 MG: 0.2 INJECTION INTRAMUSCULAR; INTRAVENOUS at 11:19

## 2024-04-16 RX ADMIN — KETAMINE HYDROCHLORIDE 10 MG: 50 INJECTION, SOLUTION INTRAMUSCULAR; INTRAVENOUS at 10:35

## 2024-04-16 RX ADMIN — PROPOFOL 40 MG: 10 INJECTION, EMULSION INTRAVENOUS at 09:19

## 2024-04-16 RX ADMIN — POLYETHYLENE GLYCOL 3350 17 G: 17 POWDER, FOR SOLUTION ORAL at 16:49

## 2024-04-16 RX ADMIN — EPHEDRINE SULFATE 7.5 MG: 5 INJECTION INTRAVENOUS at 09:42

## 2024-04-16 RX ADMIN — SODIUM CHLORIDE, SODIUM LACTATE, POTASSIUM CHLORIDE, AND CALCIUM CHLORIDE: 600; 310; 30; 20 INJECTION, SOLUTION INTRAVENOUS at 10:20

## 2024-04-16 RX ADMIN — HYDROMORPHONE HYDROCHLORIDE 0.2 MG: 2 INJECTION INTRAMUSCULAR; INTRAVENOUS; SUBCUTANEOUS at 09:49

## 2024-04-16 RX ADMIN — SODIUM CHLORIDE, SODIUM LACTATE, POTASSIUM CHLORIDE, AND CALCIUM CHLORIDE: 600; 310; 30; 20 INJECTION, SOLUTION INTRAVENOUS at 07:42

## 2024-04-16 RX ADMIN — DEXAMETHASONE SODIUM PHOSPHATE 10 MG: 10 INJECTION INTRAMUSCULAR; INTRAVENOUS at 08:20

## 2024-04-16 RX ADMIN — HYDROMORPHONE HYDROCHLORIDE 0.5 MG: 2 INJECTION INTRAMUSCULAR; INTRAVENOUS; SUBCUTANEOUS at 12:41

## 2024-04-16 RX ADMIN — PROPOFOL 200 MCG/KG/MIN: 10 INJECTION, EMULSION INTRAVENOUS at 07:42

## 2024-04-16 RX ADMIN — PHENYLEPHRINE HYDROCHLORIDE 100 MCG: 0.1 INJECTION, SOLUTION INTRAVENOUS at 09:59

## 2024-04-16 RX ADMIN — PHENYLEPHRINE HYDROCHLORIDE 100 MCG: 0.1 INJECTION, SOLUTION INTRAVENOUS at 08:29

## 2024-04-16 RX ADMIN — PROPOFOL 40 MG: 10 INJECTION, EMULSION INTRAVENOUS at 09:47

## 2024-04-16 RX ADMIN — Medication 3 MG: at 11:19

## 2024-04-16 RX ADMIN — PROPOFOL 175 MCG/KG/MIN: 10 INJECTION, EMULSION INTRAVENOUS at 08:40

## 2024-04-16 RX ADMIN — HYDROMORPHONE HYDROCHLORIDE 0.4 MG: 2 INJECTION INTRAMUSCULAR; INTRAVENOUS; SUBCUTANEOUS at 08:32

## 2024-04-16 RX ADMIN — HYDROMORPHONE HYDROCHLORIDE 0.2 MG: 2 INJECTION INTRAMUSCULAR; INTRAVENOUS; SUBCUTANEOUS at 09:38

## 2024-04-16 RX ADMIN — HYDROMORPHONE HYDROCHLORIDE 0.5 MG: 1 INJECTION, SOLUTION INTRAMUSCULAR; INTRAVENOUS; SUBCUTANEOUS at 18:17

## 2024-04-16 RX ADMIN — SODIUM CHLORIDE, SODIUM LACTATE, POTASSIUM CHLORIDE, AND CALCIUM CHLORIDE: 600; 310; 30; 20 INJECTION, SOLUTION INTRAVENOUS at 06:36

## 2024-04-16 RX ADMIN — HYDROMORPHONE HYDROCHLORIDE 0.5 MG: 1 INJECTION, SOLUTION INTRAMUSCULAR; INTRAVENOUS; SUBCUTANEOUS at 22:37

## 2024-04-16 RX ADMIN — LIDOCAINE HYDROCHLORIDE 1.5 MG/KG/HR: 4 INJECTION, SOLUTION INTRAVENOUS at 07:42

## 2024-04-16 RX ADMIN — DOCUSATE SODIUM 50 MG AND SENNOSIDES 8.6 MG 1 TABLET: 8.6; 5 TABLET, FILM COATED ORAL at 21:18

## 2024-04-16 RX ADMIN — BUPIVACAINE HYDROCHLORIDE AND EPINEPHRINE BITARTRATE 30 ML: 2.5; .005 INJECTION, SOLUTION EPIDURAL; INFILTRATION; INTRACAUDAL; PERINEURAL at 06:56

## 2024-04-16 RX ADMIN — KETAMINE HYDROCHLORIDE 20 MG: 50 INJECTION, SOLUTION INTRAMUSCULAR; INTRAVENOUS at 07:33

## 2024-04-16 RX ADMIN — HYDROMORPHONE HYDROCHLORIDE 0.5 MG: 2 INJECTION INTRAMUSCULAR; INTRAVENOUS; SUBCUTANEOUS at 12:26

## 2024-04-16 RX ADMIN — FENTANYL CITRATE 100 MCG: 50 INJECTION, SOLUTION INTRAMUSCULAR; INTRAVENOUS at 07:33

## 2024-04-16 RX ADMIN — PHENYLEPHRINE HYDROCHLORIDE 100 MCG: 0.1 INJECTION, SOLUTION INTRAVENOUS at 09:03

## 2024-04-16 RX ADMIN — HYDROMORPHONE HYDROCHLORIDE 0.5 MG: 2 INJECTION INTRAMUSCULAR; INTRAVENOUS; SUBCUTANEOUS at 12:36

## 2024-04-16 RX ADMIN — MIDAZOLAM 2 MG: 1 INJECTION INTRAMUSCULAR; INTRAVENOUS at 07:26

## 2024-04-16 RX ADMIN — LIDOCAINE HYDROCHLORIDE 100 MG: 20 INJECTION, SOLUTION EPIDURAL; INFILTRATION; INTRACAUDAL; PERINEURAL at 07:33

## 2024-04-16 RX ADMIN — KETAMINE HYDROCHLORIDE 10 MG: 50 INJECTION, SOLUTION INTRAMUSCULAR; INTRAVENOUS at 08:31

## 2024-04-16 RX ADMIN — PHENYLEPHRINE HYDROCHLORIDE 50 MCG: 0.1 INJECTION, SOLUTION INTRAVENOUS at 09:12

## 2024-04-16 RX ADMIN — DEXMEDETOMIDINE HYDROCHLORIDE 0.2 MCG/KG/HR: 4 INJECTION, SOLUTION INTRAVENOUS at 07:42

## 2024-04-16 RX ADMIN — PHENYLEPHRINE HYDROCHLORIDE 50 MCG: 0.1 INJECTION, SOLUTION INTRAVENOUS at 08:56

## 2024-04-16 RX ADMIN — Medication 140 MG: at 07:33

## 2024-04-16 RX ADMIN — GABAPENTIN 300 MG: 300 CAPSULE ORAL at 16:50

## 2024-04-16 RX ADMIN — SODIUM CHLORIDE, SODIUM LACTATE, POTASSIUM CHLORIDE, AND CALCIUM CHLORIDE: 600; 310; 30; 20 INJECTION, SOLUTION INTRAVENOUS at 11:48

## 2024-04-16 RX ADMIN — PHENYLEPHRINE HYDROCHLORIDE 50 MCG: 0.1 INJECTION, SOLUTION INTRAVENOUS at 08:47

## 2024-04-16 RX ADMIN — KETAMINE HYDROCHLORIDE 10 MG: 50 INJECTION, SOLUTION INTRAMUSCULAR; INTRAVENOUS at 09:38

## 2024-04-16 RX ADMIN — ONDANSETRON 4 MG: 2 INJECTION INTRAMUSCULAR; INTRAVENOUS at 08:20

## 2024-04-16 RX ADMIN — PHENYLEPHRINE HYDROCHLORIDE 50 MCG: 0.1 INJECTION, SOLUTION INTRAVENOUS at 08:39

## 2024-04-16 RX ADMIN — HYDROMORPHONE HYDROCHLORIDE 0.5 MG: 1 INJECTION, SOLUTION INTRAMUSCULAR; INTRAVENOUS; SUBCUTANEOUS at 14:57

## 2024-04-16 RX ADMIN — HYDROMORPHONE HYDROCHLORIDE 0.5 MG: 2 INJECTION INTRAMUSCULAR; INTRAVENOUS; SUBCUTANEOUS at 12:21

## 2024-04-16 RX ADMIN — PROPOFOL 60 MG: 10 INJECTION, EMULSION INTRAVENOUS at 09:48

## 2024-04-16 RX ADMIN — WATER 2000 MG: 1 INJECTION INTRAMUSCULAR; INTRAVENOUS; SUBCUTANEOUS at 21:15

## 2024-04-16 RX ADMIN — PHENYLEPHRINE HYDROCHLORIDE 50 MCG: 0.1 INJECTION, SOLUTION INTRAVENOUS at 10:48

## 2024-04-16 RX ADMIN — OXYCODONE 10 MG: 5 TABLET ORAL at 21:14

## 2024-04-16 RX ADMIN — OXYCODONE 5 MG: 5 TABLET ORAL at 12:29

## 2024-04-16 RX ADMIN — CYCLOBENZAPRINE 10 MG: 10 TABLET, FILM COATED ORAL at 23:28

## 2024-04-16 RX ADMIN — PHENYLEPHRINE HYDROCHLORIDE 100 MCG: 0.1 INJECTION, SOLUTION INTRAVENOUS at 11:07

## 2024-04-16 RX ADMIN — ROCURONIUM BROMIDE 30 MG: 50 INJECTION, SOLUTION INTRAVENOUS at 08:34

## 2024-04-16 RX ADMIN — DOCUSATE SODIUM 50 MG AND SENNOSIDES 8.6 MG 1 TABLET: 8.6; 5 TABLET, FILM COATED ORAL at 16:49

## 2024-04-16 RX ADMIN — GABAPENTIN 300 MG: 300 CAPSULE ORAL at 21:18

## 2024-04-16 RX ADMIN — PHENYLEPHRINE HYDROCHLORIDE 15 MCG/MIN: 10 INJECTION INTRAVENOUS at 09:08

## 2024-04-16 ASSESSMENT — PAIN DESCRIPTION - LOCATION
LOCATION: NECK
LOCATION: NECK;SHOULDER

## 2024-04-16 ASSESSMENT — PAIN SCALES - GENERAL
PAINLEVEL_OUTOF10: 9
PAINLEVEL_OUTOF10: 8
PAINLEVEL_OUTOF10: 8
PAINLEVEL_OUTOF10: 10
PAINLEVEL_OUTOF10: 10
PAINLEVEL_OUTOF10: 5
PAINLEVEL_OUTOF10: 2
PAINLEVEL_OUTOF10: 8
PAINLEVEL_OUTOF10: 10
PAINLEVEL_OUTOF10: 10
PAINLEVEL_OUTOF10: 2
PAINLEVEL_OUTOF10: 9
PAINLEVEL_OUTOF10: 10

## 2024-04-16 ASSESSMENT — PAIN DESCRIPTION - FREQUENCY
FREQUENCY: CONTINUOUS

## 2024-04-16 ASSESSMENT — PAIN DESCRIPTION - DESCRIPTORS
DESCRIPTORS: ACHING;SORE;TENDER
DESCRIPTORS: ACHING;THROBBING
DESCRIPTORS: SORE;THROBBING;TENDER
DESCRIPTORS: ACHING;SORE;THROBBING
DESCRIPTORS: ACHING

## 2024-04-16 ASSESSMENT — PAIN DESCRIPTION - ORIENTATION
ORIENTATION: RIGHT;LEFT
ORIENTATION: POSTERIOR
ORIENTATION: RIGHT;LEFT;POSTERIOR

## 2024-04-16 ASSESSMENT — PAIN DESCRIPTION - PAIN TYPE
TYPE: SURGICAL PAIN

## 2024-04-16 ASSESSMENT — PAIN - FUNCTIONAL ASSESSMENT
PAIN_FUNCTIONAL_ASSESSMENT: 0-10
PAIN_FUNCTIONAL_ASSESSMENT: 0-10
PAIN_FUNCTIONAL_ASSESSMENT: PREVENTS OR INTERFERES WITH MANY ACTIVE NOT PASSIVE ACTIVITIES
PAIN_FUNCTIONAL_ASSESSMENT: 0-10
PAIN_FUNCTIONAL_ASSESSMENT: 0-10
PAIN_FUNCTIONAL_ASSESSMENT: PREVENTS OR INTERFERES WITH MANY ACTIVE NOT PASSIVE ACTIVITIES
PAIN_FUNCTIONAL_ASSESSMENT: ADULT NONVERBAL PAIN SCALE (NPVS)
PAIN_FUNCTIONAL_ASSESSMENT: 0-10
PAIN_FUNCTIONAL_ASSESSMENT: PREVENTS OR INTERFERES WITH MANY ACTIVE NOT PASSIVE ACTIVITIES

## 2024-04-16 ASSESSMENT — PAIN DESCRIPTION - ONSET
ONSET: GRADUAL

## 2024-04-16 NOTE — OP NOTE
NEUROSURGERY OPERATIVE REPORT     Patient Name:Tiffanie Phillips     Date of Operation: 4/16/2024    Patient MRN: [id number]    Patient YOB: 1970     Pre-Op Diagnosis Codes:     * Cervical myelopathy (HCC) [G95.9]     * Cervical spinal stenosis [M48.02]     * Myelomalacia (HCC) [G95.89]    Post-Operative Diagnosis: SAME AS ABOVE      Procedure:   1. C4-C7 posterior cervical instrumented fixation and fusion (K2-Yukon and DBM)   2. C4-C7 Laminectomies for decompression   3. Bilateral C4-C7 arthrodesis and posteroloateral fusion with morselized autograft and supplemented with DBM  4. Continuous multimodality neurophysiological intraoperative neuromonitoring with onsite tech and remote physician oversite  5. Intraoperative fluoroscopy      Surgeon: Kervin Mueller MD    Anesthesiologist: Jovany Lubin MD     Surgical Assistant: Sacha Mueller, Surgical Technician [employer]     Surgical Staff:   See Operative Record     Anesthesia : General Endotracheal anesthesia     Estimated Blood Loss: 550 cc     Specimens: None     Indication for Procedure:   The patient had symptomatic cervical spinal stenosis with neuroforaminal stenosis and cord compression resulting in cervical myelopathy.     Procedure in Detail:  The patient was was transported to the OR and placed under general anesthesia. The patient was placed under continuous intraoperative neurophysiological monitoring. Pre-positioning neuromonitoring signals were obtained in the supine position with SSEPs, MEPs and EMG. the patient was placed in the Tara cranial 3 point fixation head pins then she was very carefully positioned in the prone position, I maintained controlled the head and Tara cranial three point fixation device throughout the positioning. A surgical pause time-out was performed at the beginning of the case prior to incision confirming procedure, sterility and functionality of instruments, surgical site, stability of patient, and  removed.  A medium hemovac drain was brought through a separate stab wound from below the deep fascia.  The deep fascia was closed over the hemovac drain with interrupted 0 Vicryl after hemostasis was obtained. The subcutaneous tissue was reapproximated with 0 and 2-0 interrupted Vicryl.  The skin was closed a running 3-0 Monocryl and LiquiBand secure skin closure system the wound was sterilely bandaged.  The patient was then removed from the Tara 3-point cranial fixationm turned carefully to supine position, allowed to recover from his general endotracheal anesthesia.  All surgical counts were correct at the end of the case.  The patient tolerated the procedure without any complications     CONTINUOUS INTRAOPERATIVE NEUROMONITORING:   Multi-modality intraoperative neuromonitoring was performed with an onsite technician and remote physician in an effort to safeguard the patient. All IOM studies were performed under real time physician supervision via internet communication allowing continuous or immediate contact between the interpreting physician and myself.     Intraoperative neuromonitoring included the following modalities: SSEPs, MEPs, and EMG    No significant changes; no sustained EMG; All data within normal limits.       Kervin Mueller MD FAANS   Neurosurgeon  [facility]  [facility]

## 2024-04-16 NOTE — ANESTHESIA PROCEDURE NOTES
Peripheral Block    Patient location during procedure: pre-op  Reason for block: post-op pain management and at surgeon's request  Start time: 4/16/2024 6:55 AM  End time: 4/16/2024 6:56 AM  Staffing  Anesthesiologist: Jovany Uriarte MD  Performed by: Jovany Uriarte MD  Authorized by: Jovany Uriarte MD    Preanesthetic Checklist  Completed: patient identified, IV checked, site marked, risks and benefits discussed, surgical/procedural consents, equipment checked, pre-op evaluation, timeout performed, anesthesia consent given, oxygen available and monitors applied/VS acknowledged  Peripheral Block   Patient position: prone  Prep: ChloraPrep  Provider prep: mask and sterile gloves  Patient monitoring: cardiac monitor, continuous pulse ox, frequent blood pressure checks, IV access, oxygen and responsive to questions  Block type: TAP  Laterality: left  Injection technique: single-shot  Guidance: ultrasound guided    Needle   Needle type: insulated echogenic nerve stimulator needle   Needle gauge: 22 G  Needle localization: anatomical landmarks and ultrasound guidance  Test dose: negative  Needle length: 4.  Assessment   Injection assessment: negative aspiration for heme, no paresthesia on injection and local visualized surrounding nerve on ultrasound  Slow fractionated injection: yes  Hemodynamics: stable  Outcomes: uncomplicated    Medications Administered  BUPivacaine 0.25%-EPINEPHrine injection [id number] - Perineural   30 mL - 4/16/2024 6:56:00 AM

## 2024-04-16 NOTE — ANESTHESIA PROCEDURE NOTES
Peripheral Block    Patient location during procedure: pre-op  Reason for block: post-op pain management and at surgeon's request  Start time: 4/16/2024 6:51 AM  End time: 4/16/2024 6:54 AM  Staffing  Anesthesiologist: Jovany Uriarte MD  Performed by: Jovany Uriarte MD  Authorized by: Jovany Uriarte MD    Preanesthetic Checklist  Completed: patient identified, IV checked, site marked, risks and benefits discussed, surgical/procedural consents, equipment checked, pre-op evaluation, timeout performed, anesthesia consent given, oxygen available and monitors applied/VS acknowledged  Peripheral Block   Patient position: prone  Prep: ChloraPrep  Provider prep: mask and sterile gloves  Patient monitoring: cardiac monitor, continuous pulse ox, frequent blood pressure checks, IV access, oxygen and responsive to questions  Block type: TAP  Laterality: right  Injection technique: single-shot  Guidance: ultrasound guided    Needle   Needle type: insulated echogenic nerve stimulator needle   Needle gauge: 22 G  Needle localization: anatomical landmarks and ultrasound guidance  Test dose: negative  Needle length: 4.  Assessment   Injection assessment: negative aspiration for heme, no paresthesia on injection and local visualized surrounding nerve on ultrasound  Slow fractionated injection: yes  Hemodynamics: stable  Outcomes: uncomplicated    Medications Administered  BUPivacaine 0.25%-EPINEPHrine injection 1:[id number] - Perineural   30 mL - 4/16/2024 6:54:00 AM

## 2024-04-16 NOTE — ADDENDUM NOTE
Addendum  created 04/16/24 1703 by Sun Jacques APRN - CRNA    Clinical Note Signed, Intraprocedure Blocks edited

## 2024-04-16 NOTE — ANESTHESIA PRE PROCEDURE
Department of Anesthesiology  Preprocedure Note       Name:  Tiffanie Phillips   Age:  54 y.o.  :  1970                                          MRN:  [id number]         Date:  2024      Surgeon: Surgeon(s):  Kervin Mueller MD    Procedure: Procedure(s):  C4-C7 posterior cervical laminectomy with instrumented fixation and lateral mass arthrodesis.    Medications prior to admission:   Prior to Admission medications    Medication Sig Start Date End Date Taking? Authorizing Provider   norethindrone (AYGESTIN) 5 MG tablet Take 1 tablet by mouth in the morning and at bedtime 24   Peggy Jackson MD   nitrofurantoin (MACRODANTIN) 50 MG capsule Take 1 capsule by mouth daily as needed    Zac Landin MD   amitriptyline (ELAVIL) 50 MG tablet Take 1 tablet by mouth 17  Zac Landin MD   sertraline (ZOLOFT) 50 MG tablet Take 1 tablet by mouth daily  Patient not taking: Reported on 2024    Zac Landin MD   vibegron (GEMTESA) 75 MG TABS tablet Take 1 tablet by mouth daily 24   Clementine Price, APRN - CNP   vitamin D (ERGOCALCIFEROL) 1.25 MG (41947 UT) CAPS capsule Take 1 capsule by mouth once a week    Zac Landin MD   gabapentin (NEURONTIN) 300 MG capsule Take 1 capsule by mouth 3 times daily for 180 days. 1 qhs x 1 week, if tolerate well, increase to 1 bid x 1 week, then 1 tid. 12/6/23 6/3/24  Hi Tyler MD       Current medications:    Current Facility-Administered Medications   Medication Dose Route Frequency Provider Last Rate Last Admin   • fentaNYL (SUBLIMAZE) injection 100 mcg  100 mcg IntraVENous Once PRN Jovany Uriarte MD       • scopolamine (TRANSDERM-SCOP) transdermal patch 1 patch  1 patch TransDERmal Q72H Jovany Uriarte MD   1 patch at 24 0636   • lactated ringers IV soln infusion   IntraVENous Continuous Jovany Uriarte  mL/hr at 24 0703 NoRateChange at 24 0703   • sodium chloride flush 0.9 %

## 2024-04-16 NOTE — ANESTHESIA PROCEDURE NOTES
Arterial Line:    An arterial line was placed using surface landmarks, in the OR for the following indication(s): continuous blood pressure monitoring and blood sampling needed.    A 20 gauge (size), 1 and 3/4 inch (length), Arrow (type) catheter was placed, Seldinger technique used, into the left radial artery, secured by tape and Tegaderm.  Anesthesia type: General    Events:  patient tolerated procedure well with no complications and EBL < 5mL.    Additional notes:  Placed by CONCETTA Gonzales4/16/2024 7:42 AM4/16/2024 7:45 AM  Resident/CRNA: Sun Jacques APRN - CRNA  Performed: Resident/CRNA   Preanesthetic Checklist  Completed: patient identified, IV checked, site marked, risks and benefits discussed, surgical/procedural consents, equipment checked, pre-op evaluation, timeout performed, anesthesia consent given, oxygen available, monitors applied/VS acknowledged, fire risk safety assessment completed and verbalized and blood product R/B/A discussed and consented

## 2024-04-16 NOTE — ANESTHESIA PROCEDURE NOTES
Peripheral Block    Patient location during procedure: pre-op  Reason for block: post-op pain management and at surgeon's request  Start time: 4/16/2024 6:51 AM  End time: 4/16/2024 6:54 AM  Staffing  Anesthesiologist: Jovany Uriarte MD  Performed by: Jovany Uriarte MD  Authorized by: Jovany Uriarte MD    Preanesthetic Checklist  Completed: patient identified, IV checked, site marked, risks and benefits discussed, surgical/procedural consents, equipment checked, pre-op evaluation, timeout performed, anesthesia consent given, oxygen available and monitors applied/VS acknowledged  Peripheral Block   Patient position: prone  Prep: ChloraPrep  Provider prep: mask and sterile gloves  Patient monitoring: cardiac monitor, continuous pulse ox, frequent blood pressure checks, IV access, oxygen and responsive to questions  Block type: TAP  Laterality: right  Injection technique: single-shot  Guidance: ultrasound guided    Needle   Needle type: insulated echogenic nerve stimulator needle   Needle gauge: 22 G  Needle localization: anatomical landmarks and ultrasound guidance  Test dose: negative  Needle length: 4.  Assessment   Injection assessment: negative aspiration for heme, no paresthesia on injection and local visualized surrounding nerve on ultrasound  Slow fractionated injection: yes  Hemodynamics: stable  Outcomes: uncomplicated    Medications Administered  BUPivacaine 0.25%-EPINEPHrine injection 1:[id number] - Perineural   30 mL - 4/16/2024 6:56:00 AM

## 2024-04-16 NOTE — ANESTHESIA POSTPROCEDURE EVALUATION
Department of Anesthesiology  Postprocedure Note    Patient: Tiffanie Phillips  MRN: [id number]  YOB: 1970  Date of evaluation: 4/16/2024    Procedure Summary       Date: 04/16/24 Room / Location:  MAIN OR [address] [place] [address] MAIN OR    Anesthesia Start: 0725 Anesthesia Stop: 1134    Procedure: C4-C7 posterior cervical laminectomy with instrumented fixation and lateral mass arthrodesis. (Neck) Diagnosis:       Cervical myelopathy (HCC)      Cervical spinal stenosis      Myelomalacia (HCC)      (Cervical myelopathy (HCC) [G95.9])      (Cervical spinal stenosis [M48.02])      (Myelomalacia (HCC) [G95.89])    Providers: Kervin Mueller MD Responsible Provider: Jovany Uriarte MD    Anesthesia Type: General ASA Status: 2            Anesthesia Type: General    Salud Phase I: Salud Score: 8    Salud Phase II:      Anesthesia Post Evaluation    Patient location during evaluation: PACU  Patient participation: complete - patient participated  Level of consciousness: awake and awake and alert  Airway patency: patent  Nausea & Vomiting: no nausea  Cardiovascular status: hemodynamically stable  Respiratory status: acceptable  Hydration status: euvolemic  Multimodal analgesia pain management approach  Pain management: adequate    No notable events documented.

## 2024-04-16 NOTE — INTERVAL H&P NOTE
Update History & Physical    The patient's History and Physical of April 8, 2024 was reviewed with the patient and I examined the patient. There was no change. The surgical site was confirmed by the patient and me.     Plan: The risks, benefits, expected outcome, and alternative to the recommended procedure have been discussed with the patient. Patient understands and wants to proceed with the procedure.     Electronically signed by Kervin Mueller MD on 4/16/2024 at 7:19 AM      
decreased strength

## 2024-04-17 LAB
ERYTHROCYTE [DISTWIDTH] IN BLOOD BY AUTOMATED COUNT: 11.9 % (ref 11.9–14.6)
HCT VFR BLD AUTO: 32.8 % (ref 35.8–46.3)
HGB BLD-MCNC: 11.3 G/DL (ref 11.7–15.4)
MCH RBC QN AUTO: 31.2 PG (ref 26.1–32.9)
MCHC RBC AUTO-ENTMCNC: 34.5 G/DL (ref 31.4–35)
MCV RBC AUTO: 90.6 FL (ref 82–102)
NRBC # BLD: 0 K/UL (ref 0–0.2)
PLATELET # BLD AUTO: 291 K/UL (ref 150–450)
PMV BLD AUTO: 10.2 FL (ref 9.4–12.3)
RBC # BLD AUTO: 3.62 M/UL (ref 4.05–5.2)
WBC # BLD AUTO: 10.9 K/UL (ref 4.3–11.1)

## 2024-04-17 PROCEDURE — 6360000002 HC RX W HCPCS: Performed by: NEUROLOGICAL SURGERY

## 2024-04-17 PROCEDURE — 85027 COMPLETE CBC AUTOMATED: CPT

## 2024-04-17 PROCEDURE — 2580000003 HC RX 258: Performed by: NEUROLOGICAL SURGERY

## 2024-04-17 PROCEDURE — 97530 THERAPEUTIC ACTIVITIES: CPT

## 2024-04-17 PROCEDURE — 97165 OT EVAL LOW COMPLEX 30 MIN: CPT

## 2024-04-17 PROCEDURE — 97161 PT EVAL LOW COMPLEX 20 MIN: CPT

## 2024-04-17 PROCEDURE — 36415 COLL VENOUS BLD VENIPUNCTURE: CPT

## 2024-04-17 PROCEDURE — 2500000003 HC RX 250 WO HCPCS: Performed by: NEUROLOGICAL SURGERY

## 2024-04-17 PROCEDURE — 6370000000 HC RX 637 (ALT 250 FOR IP): Performed by: NEUROLOGICAL SURGERY

## 2024-04-17 PROCEDURE — 1100000000 HC RM PRIVATE

## 2024-04-17 PROCEDURE — 99024 POSTOP FOLLOW-UP VISIT: CPT | Performed by: NURSE PRACTITIONER

## 2024-04-17 RX ADMIN — DOCUSATE SODIUM 50 MG AND SENNOSIDES 8.6 MG 1 TABLET: 8.6; 5 TABLET, FILM COATED ORAL at 20:51

## 2024-04-17 RX ADMIN — OXYCODONE 10 MG: 5 TABLET ORAL at 09:10

## 2024-04-17 RX ADMIN — OXYCODONE 10 MG: 5 TABLET ORAL at 01:32

## 2024-04-17 RX ADMIN — GABAPENTIN 300 MG: 300 CAPSULE ORAL at 20:51

## 2024-04-17 RX ADMIN — DOCUSATE SODIUM 50 MG AND SENNOSIDES 8.6 MG 1 TABLET: 8.6; 5 TABLET, FILM COATED ORAL at 09:11

## 2024-04-17 RX ADMIN — CYCLOBENZAPRINE 10 MG: 10 TABLET, FILM COATED ORAL at 11:57

## 2024-04-17 RX ADMIN — GABAPENTIN 300 MG: 300 CAPSULE ORAL at 14:43

## 2024-04-17 RX ADMIN — OXYCODONE 10 MG: 5 TABLET ORAL at 18:28

## 2024-04-17 RX ADMIN — WATER 2000 MG: 1 INJECTION INTRAMUSCULAR; INTRAVENOUS; SUBCUTANEOUS at 09:11

## 2024-04-17 RX ADMIN — HYDROMORPHONE HYDROCHLORIDE 0.5 MG: 1 INJECTION, SOLUTION INTRAMUSCULAR; INTRAVENOUS; SUBCUTANEOUS at 20:52

## 2024-04-17 RX ADMIN — POLYETHYLENE GLYCOL 3350 17 G: 17 POWDER, FOR SOLUTION ORAL at 09:11

## 2024-04-17 RX ADMIN — GABAPENTIN 300 MG: 300 CAPSULE ORAL at 09:11

## 2024-04-17 RX ADMIN — SODIUM CHLORIDE, PRESERVATIVE FREE 10 ML: 5 INJECTION INTRAVENOUS at 20:54

## 2024-04-17 RX ADMIN — HYDROMORPHONE HYDROCHLORIDE 0.5 MG: 1 INJECTION, SOLUTION INTRAMUSCULAR; INTRAVENOUS; SUBCUTANEOUS at 17:28

## 2024-04-17 RX ADMIN — SODIUM CHLORIDE: 9 INJECTION, SOLUTION INTRAVENOUS at 01:35

## 2024-04-17 RX ADMIN — HYDROMORPHONE HYDROCHLORIDE 0.5 MG: 1 INJECTION, SOLUTION INTRAMUSCULAR; INTRAVENOUS; SUBCUTANEOUS at 11:51

## 2024-04-17 ASSESSMENT — PAIN SCALES - GENERAL
PAINLEVEL_OUTOF10: 7
PAINLEVEL_OUTOF10: 10
PAINLEVEL_OUTOF10: 8
PAINLEVEL_OUTOF10: 8
PAINLEVEL_OUTOF10: 7
PAINLEVEL_OUTOF10: 7
PAINLEVEL_OUTOF10: 9
PAINLEVEL_OUTOF10: 6
PAINLEVEL_OUTOF10: 0

## 2024-04-17 ASSESSMENT — PAIN DESCRIPTION - ORIENTATION
ORIENTATION: POSTERIOR
ORIENTATION: POSTERIOR
ORIENTATION: LEFT;RIGHT
ORIENTATION: POSTERIOR

## 2024-04-17 ASSESSMENT — PAIN DESCRIPTION - LOCATION
LOCATION: NECK

## 2024-04-17 ASSESSMENT — PAIN - FUNCTIONAL ASSESSMENT
PAIN_FUNCTIONAL_ASSESSMENT: ACTIVITIES ARE NOT PREVENTED

## 2024-04-17 ASSESSMENT — PAIN DESCRIPTION - PAIN TYPE: TYPE: SURGICAL PAIN

## 2024-04-17 ASSESSMENT — PAIN DESCRIPTION - ONSET: ONSET: GRADUAL

## 2024-04-17 ASSESSMENT — PAIN DESCRIPTION - DESCRIPTORS
DESCRIPTORS: THROBBING
DESCRIPTORS: ACHING

## 2024-04-17 ASSESSMENT — PAIN DESCRIPTION - FREQUENCY: FREQUENCY: CONTINUOUS

## 2024-04-17 NOTE — PROGRESS NOTES
[facility]    Assessment: S/P C4-7 posterior cervical fusion    POD#1    Plan:  -aspen collar for comfort  -PT to mobilize  -monitor drain output    Subjective:    Objective:  Afebrile  VSS  GCS15  Moving all extremities  Incision CDI  Drain output yesterday 200cc    Patient Vitals for the past 12 hrs:   Temp Pulse Resp BP SpO2   04/17/24 0743 98.4 °F (36.9 °C) 80 16 (!) 146/75 96 %   04/17/24 0310 98.4 °F (36.9 °C) 79 19 (!) 152/83 97 %   04/17/24 0202 -- -- 18 -- --   04/17/24 0132 -- -- 18 -- --        Recent Results (from the past 24 hour(s))   CBC    Collection Time: 04/17/24  5:40 AM   Result Value Ref Range    WBC 10.9 4.3 - 11.1 K/uL    RBC 3.62 (L) 4.05 - 5.2 M/uL    Hemoglobin 11.3 (L) 11.7 - 15.4 g/dL    Hematocrit 32.8 (L) 35.8 - 46.3 %    MCV 90.6 82 - 102 FL    MCH 31.2 26.1 - 32.9 PG    MCHC 34.5 31.4 - 35.0 g/dL    RDW 11.9 11.9 - 14.6 %    Platelets 291 150 - 450 K/uL    MPV 10.2 9.4 - 12.3 FL    nRBC 0.00 0.0 - 0.2 K/uL        Suzy Garcia, APRN - CNP

## 2024-04-17 NOTE — CARE COORDINATION
CM screened chart for potential discharge needs; no CM consult received.  Patient admitted for elective surgery with Dr. Mueller and is post-op day 1.  Therapy recommending HH PT only and possibly a RW.  CM will continue to follow for final transitions of care planning.        04/17/24 7514   Service Assessment   Patient Orientation Alert and Oriented   Cognition Alert   History Provided By Medical Record   Primary Caregiver Self   Accompanied By/Relationship N/A   Support Systems Spouse/Significant Other;Parent   Patient's Healthcare Decision Maker is: Legal Next of Kin   PCP Verified by CM Yes   Last Visit to PCP Within last 3 months   Prior Functional Level Independent in ADLs/IADLs   Current Functional Level Assistance with the following:;Bathing;Dressing;Toileting;Cooking;Housework;Shopping;Mobility   Can patient return to prior living arrangement Yes   Ability to make needs known: Good   Family able to assist with home care needs: Yes   Would you like for me to discuss the discharge plan with any other family members/significant others, and if so, who? No   Financial Resources Other (Comment)  (Commercial)   Community Resources None   CM/SW Referral Other (see comment)  (No CM consult)   Social/Functional History   Lives With Significant other;Parent   Type of Home House   Home Layout Two level;Able to Live on Main level with bedroom/bathroom   Home Access Stairs to enter with rails   Entrance Stairs - Number of Steps 3   Home Equipment None   Discharge Planning   Living Arrangements Spouse/Significant Other;Parent   Current Services Prior To Admission None   Potential Assistance Needed Durable Medical Equipment;Home Care;Outpatient PT/OT   Potential DME Needed Walker   Potential Assistance Purchasing Medications No   Patient expects to be discharged to: House

## 2024-04-17 NOTE — PROGRESS NOTES
Transfers    Sit to Stand [] [] [] [x] [x] [] [] [] [] [] [] X2 with RW, required cues for correct hand placement, required increased time/ effort to complete   Bed to Chair [] [] [] [] [x] [] [] [] [] [] []    Stand to Sit [] [] [] [] [x] [] [] [] [] [] []     [] [] [] [] [] [] [] [] [] [] []    I=Independent, Mod I=Modified Independent, S=Supervision, SBA=Standby Assistance, CGA=Contact Guard Assistance,   Min=Minimal Assistance, Mod=Moderate Assistance, Max=Maximal Assistance, Total=Total Assistance, NT=Not Tested    GAIT: I Mod I S SBA CGA Min Mod Max Total  NT x2 Comments:   Level of Assistance [] [] [] [x] [x] [] [] [] [] [] [] Pt demonstrated R flat foot contact, occasional R foot drag.     Pt demonstrated heel first contact on the L.    Distance 200 feet    DME Rolling Walker    Gait Quality Decreased avis  and decreased foot clearance on the R     Weightbearing Status Restrictions/Precautions  Restrictions/Precautions: Fall Risk  Required Braces or Orthoses?: Yes    Stairs      I=Independent, Mod I=Modified Independent, S=Supervision, SBA=Standby Assistance, CGA=Contact Guard Assistance,   Min=Minimal Assistance, Mod=Moderate Assistance, Max=Maximal Assistance, Total=Total Assistance, NT=Not Tested    PLAN:   FREQUENCY AND DURATION: BID for duration of hospital stay or until stated goals are met, whichever comes first.    THERAPY PROGNOSIS: Good    PROBLEM LIST:   (Skilled intervention is medically necessary to address:)  Decreased ADL/Functional Activities  Decreased Activity Tolerance  Decreased AROM/PROM  Decreased Balance  Decreased Gait Ability  Decreased Safety Awareness  Decreased Strength  Decreased Transfer Abilities  Increased Pain INTERVENTIONS PLANNED:   (Benefits and precautions of physical therapy have been discussed with the patient.)  Self Care Training  Therapeutic Activity  Therapeutic Exercise/HEP  Neuromuscular Re-education  Gait Training  Education       TREATMENT:   EVALUATION:  LOW COMPLEXITY: (Untimed Charge)  The initial evaluation charge encompasses clinical chart review, objective assessment, interpretation of assessment, and skilled monitoring of the patient's response to treatment in order to develop a plan of care.     TREATMENT:   Therapeutic Activity (10 Minutes): Therapeutic activity included Supine to Sit, Scooting, Transfer Training, Ambulation on level ground, Sitting balance , and Standing balance to improve functional Activity tolerance, Balance, Mobility, and Strength.    TREATMENT GRID:  N/A    AFTER TREATMENT PRECAUTIONS: Alarm Activated, Bed/Chair Locked, Call light within reach, Chair, Needs within reach, RN at bedside, and Visitors at bedside    INTERDISCIPLINARY COLLABORATION:  RN/ PCT    EDUCATION: Education Given To: Patient;Family  Education Provided: Role of Therapy;Plan of Care;Precautions;Transfer Training;Fall Prevention Strategies  Education Method: Verbal  Barriers to Learning: None  Education Outcome: Verbalized understanding;Continued education needed    TIME IN/OUT:  Time In: 0849  Time Out: 0910  Minutes: 21    DANIEL MOTA, PT

## 2024-04-17 NOTE — DISCHARGE INSTRUCTIONS
Discharge Instructions - Cervical Surgery    Pain Medicine  Take pain medicine every 6hrs as needed.  You are encouraged to space out your pain pill doses until you are able wean off.  Pain medicine can cause constipation and upset stomach so it is advised to take with food.  It is encouraged to take a daily over the counter stool softer and drink plenty of water to help prevent constipation.  If you need a medication refill be aware it may take up to 3 days for a prescription to be called in.    Smoking/nicotine  Avoid the use of cigarettes or nicotine products during recovery as this slows wound healing causing an increased risk for infection.    Diabetes  If you are a diabetic maintaining stable healthy blood sugar levels is important to proper wound healing.    Blood Thinners (may not apply)  Be sure to discuss with the doctor when to restart your regular blood thinners.  Showering  You may shower as normal once the large, bulky bandages are removed from your incisions. If they are not removed before your discharge from the hospital, you may remove them 36-48 hours after your surgery. Hair washing is permissible while in the shower. No tub baths, hot tubs or whirlpools until seen in the office.    Exercise  You have unlimited walking and stir climbing privileges. Walking outside (in nice weather only) or walking on a treadmill without an incline is also allowed.    Do NOT lift anything weighing greater than 10-15 lbs. Especially try to avoid lifting or reaching above your head.    Incision  Please have someone check your incisions daily. If any of the following should occur, please call the office:   Drainage from incision site   Opening of incisions   Fevers greater than 101 degrees   Flu-like symptoms   Increased redness and/or tenderness  You have blue sutures on your incision. Do not remove or cut the blue stitch that is taped down with steri-strips. This will be done at your first follow up visit at our

## 2024-04-17 NOTE — PROGRESS NOTES
PCT    EDUCATION:  Education Given To: Patient  Education Provided: Role of Therapy;Plan of Care;Precautions    TOTAL TREATMENT DURATION AND TIME:  Time In: 1120  Time Out: 1130  Minutes: 10    Imelda Negro OT

## 2024-04-17 NOTE — PROGRESS NOTES
ACUTE PHYSICAL THERAPY GOALS:   (Developed with and agreed upon by patient and/or caregiver.)    (1.) Tiffanie Phillips  will move from supine to sit and sit to supine  and roll side to side with INDEPENDENT within 7 treatment day(s).    (2.) Tiffanie Phillips will transfer from bed to chair and chair to bed with MODIFIED INDEPENDENCE using the least restrictive device within 7 treatment day(s).    (3.) Tiffanie Phillips will ambulate with MODIFIED INDEPENDENCE for 500 feet with the least restrictive device within 7 treatment day(s).   (4.) Tiffanie Phillips will perform standing static and dynamic balance activities x 10 minutes with MODIFIED INDEPENDENCE to improve safety within 7 treatment day(s).  (5.) Tiffanie Phillips will ascend and descend 3 stairs using 1 hand rail(s) with MODIFIED INDEPENDENCE to improve functional mobility and safety within 7 treatment day(s).  (6.) Tiffanie Phillips will perform therapeutic exercises x 10 min for HEP with MODIFIED INDEPENDENCE to improve strength, endurance, and functional mobility within 7 treatment day(s).          PHYSICAL THERAPY: Daily Note PM   (Link to Caseload Tracking: PT Visit Days : 1  Time In/Out PT Charge Capture  Rehab Caseload Tracker  Orders    Tiffanie Phillips is a 54 y.o. female   PRIMARY DIAGNOSIS: Myelomalacia (HCC)  Cervical myelopathy (HCC) [G95.9]  Cervical spinal stenosis [M48.02]  Myelomalacia (HCC) [G95.89]  S/P cervical spinal fusion [Z98.1]  Procedure(s) (LRB):  C4-C7 posterior cervical laminectomy with instrumented fixation and lateral mass arthrodesis. (N/A)  1 Day Post-Op  Inpatient: Payor: [employer] / Plan: [employer] NAP CHOICE POS II / Product Type: *No Product type* /     ASSESSMENT:     REHAB RECOMMENDATIONS:   Recommendation to date pending progress:  Setting:  Outpatient Therapy    Equipment:    Rolling Walker     ASSESSMENT:  Ms. Phillips is a 54 year old female admitted to the hospital on 4/16/24 POD #1 C4-C7  Braces or Orthoses  Cervical:  (Aspen collar)     MOBILITY: I Mod I S SBA CGA Min Mod Max Total  NT x2 Comments:   Bed Mobility    Rolling [] [] [] [x] [] [] [] [] [] [] []    Supine to Sit [] [] [] [] [] [] [] [] [] [x] []    Scooting [] [] [] [] [] [] [] [] [] [x] []    Sit to Supine [] [] [] [x] [] [] [] [] [] [] [] Log rolling technique, use of bed rail   Transfers    Sit to Stand [] [] [] [x] [] [] [] [] [] [] []    Bed to Chair [] [] [] [x] [] [] [] [] [] [] []    Stand to Sit [] [] [] [x] [] [] [] [] [] [] []     [] [] [] [] [] [] [] [] [] [] []    I=Independent, Mod I=Modified Independent, S=Supervision, SBA=Standby Assistance, CGA=Contact Guard Assistance,   Min=Minimal Assistance, Mod=Moderate Assistance, Max=Maximal Assistance, Total=Total Assistance, NT=Not Tested    BALANCE: Good Fair+ Fair Fair- Poor NT Comments   Sitting Static [x] [] [] [] [] []    Sitting Dynamic [] [x] [] [] [] []              Standing Static [] [x] [] [] [] []    Standing Dynamic [] [x] [] [] [] []      GAIT: I Mod I S SBA CGA Min Mod Max Total  NT x2 Comments:   Level of Assistance [] [] [] [x] [] [] [] [] [] [] [] R foot drag, overall steady, no LOB noted   Distance 250 feet    DME Rolling Walker    Gait Quality Decreased avis , Decreased step clearance, and Decreased step length    Weightbearing Status      Stairs      I=Independent, Mod I=Modified Independent, S=Supervision, SBA=Standby Assistance, CGA=Contact Guard Assistance,   Min=Minimal Assistance, Mod=Moderate Assistance, Max=Maximal Assistance, Total=Total Assistance, NT=Not Tested    PLAN:   FREQUENCY AND DURATION: BID for duration of hospital stay or until stated goals are met, whichever comes first.    TREATMENT:   TREATMENT:   Therapeutic Activity (16 Minutes): Therapeutic activity included Sit to Supine, Transfer Training, Ambulation on level ground, Sitting balance , and Standing balance to improve functional Activity tolerance, Balance, Mobility, and Strength.

## 2024-04-17 NOTE — PLAN OF CARE
Problem: Pain  Goal: Verbalizes/displays adequate comfort level or baseline comfort level  4/17/2024 0308 by Marianela Olvera RN  Outcome: Progressing  4/16/2024 1708 by Marina Vasquez  Outcome: Progressing     Problem: Safety - Adult  Goal: Free from fall injury  4/17/2024 0308 by Marianela Olvera RN  Outcome: Progressing  4/16/2024 1708 by Marina Vasquez  Outcome: Progressing     Problem: Discharge Planning  Goal: Discharge to home or other facility with appropriate resources  4/17/2024 0308 by Marianela Olvera RN  Outcome: Progressing  4/16/2024 1708 by Marina Vasquez  Outcome: Progressing     Problem: ABCDS Injury Assessment  Goal: Absence of physical injury  Outcome: Progressing

## 2024-04-18 PROCEDURE — 1100000000 HC RM PRIVATE

## 2024-04-18 PROCEDURE — 6370000000 HC RX 637 (ALT 250 FOR IP): Performed by: NEUROLOGICAL SURGERY

## 2024-04-18 PROCEDURE — 97530 THERAPEUTIC ACTIVITIES: CPT

## 2024-04-18 PROCEDURE — 2500000003 HC RX 250 WO HCPCS: Performed by: NEUROLOGICAL SURGERY

## 2024-04-18 PROCEDURE — 2580000003 HC RX 258: Performed by: NEUROLOGICAL SURGERY

## 2024-04-18 RX ADMIN — POLYETHYLENE GLYCOL 3350 17 G: 17 POWDER, FOR SOLUTION ORAL at 08:33

## 2024-04-18 RX ADMIN — DOCUSATE SODIUM 50 MG AND SENNOSIDES 8.6 MG 1 TABLET: 8.6; 5 TABLET, FILM COATED ORAL at 08:33

## 2024-04-18 RX ADMIN — HYDROMORPHONE HYDROCHLORIDE 0.5 MG: 1 INJECTION, SOLUTION INTRAMUSCULAR; INTRAVENOUS; SUBCUTANEOUS at 12:44

## 2024-04-18 RX ADMIN — SODIUM CHLORIDE, PRESERVATIVE FREE 10 ML: 5 INJECTION INTRAVENOUS at 20:10

## 2024-04-18 RX ADMIN — GABAPENTIN 300 MG: 300 CAPSULE ORAL at 08:33

## 2024-04-18 RX ADMIN — OXYCODONE 10 MG: 5 TABLET ORAL at 05:08

## 2024-04-18 RX ADMIN — CYCLOBENZAPRINE 10 MG: 10 TABLET, FILM COATED ORAL at 08:35

## 2024-04-18 RX ADMIN — HYDROMORPHONE HYDROCHLORIDE 0.5 MG: 1 INJECTION, SOLUTION INTRAMUSCULAR; INTRAVENOUS; SUBCUTANEOUS at 16:45

## 2024-04-18 RX ADMIN — OXYCODONE 5 MG: 5 TABLET ORAL at 22:20

## 2024-04-18 RX ADMIN — GABAPENTIN 300 MG: 300 CAPSULE ORAL at 20:11

## 2024-04-18 RX ADMIN — GABAPENTIN 300 MG: 300 CAPSULE ORAL at 15:39

## 2024-04-18 RX ADMIN — DOCUSATE SODIUM 50 MG AND SENNOSIDES 8.6 MG 1 TABLET: 8.6; 5 TABLET, FILM COATED ORAL at 20:11

## 2024-04-18 RX ADMIN — OXYCODONE 10 MG: 5 TABLET ORAL at 18:05

## 2024-04-18 RX ADMIN — HYDROMORPHONE HYDROCHLORIDE 0.5 MG: 1 INJECTION, SOLUTION INTRAMUSCULAR; INTRAVENOUS; SUBCUTANEOUS at 20:08

## 2024-04-18 RX ADMIN — HYDROMORPHONE HYDROCHLORIDE 0.5 MG: 1 INJECTION, SOLUTION INTRAMUSCULAR; INTRAVENOUS; SUBCUTANEOUS at 07:43

## 2024-04-18 RX ADMIN — CYCLOBENZAPRINE 10 MG: 10 TABLET, FILM COATED ORAL at 16:45

## 2024-04-18 ASSESSMENT — PAIN DESCRIPTION - DESCRIPTORS
DESCRIPTORS: ACHING
DESCRIPTORS: ACHING
DESCRIPTORS: BURNING;ACHING
DESCRIPTORS: BURNING;ACHING
DESCRIPTORS: ACHING
DESCRIPTORS: ACHING
DESCRIPTORS: ACHING;THROBBING

## 2024-04-18 ASSESSMENT — PAIN - FUNCTIONAL ASSESSMENT
PAIN_FUNCTIONAL_ASSESSMENT: ACTIVITIES ARE NOT PREVENTED

## 2024-04-18 ASSESSMENT — PAIN DESCRIPTION - FREQUENCY
FREQUENCY: INTERMITTENT
FREQUENCY: CONTINUOUS

## 2024-04-18 ASSESSMENT — PAIN SCALES - GENERAL
PAINLEVEL_OUTOF10: 8
PAINLEVEL_OUTOF10: 8
PAINLEVEL_OUTOF10: 5
PAINLEVEL_OUTOF10: 10
PAINLEVEL_OUTOF10: 5
PAINLEVEL_OUTOF10: 4
PAINLEVEL_OUTOF10: 0
PAINLEVEL_OUTOF10: 8
PAINLEVEL_OUTOF10: 10

## 2024-04-18 ASSESSMENT — PAIN DESCRIPTION - ORIENTATION
ORIENTATION: POSTERIOR

## 2024-04-18 ASSESSMENT — PAIN DESCRIPTION - LOCATION
LOCATION: NECK

## 2024-04-18 ASSESSMENT — PAIN DESCRIPTION - ONSET
ONSET: GRADUAL
ONSET: ON-GOING

## 2024-04-18 ASSESSMENT — PAIN DESCRIPTION - PAIN TYPE
TYPE: SURGICAL PAIN
TYPE: SURGICAL PAIN

## 2024-04-18 NOTE — PROGRESS NOTES
ACUTE PHYSICAL THERAPY GOALS:   (Developed with and agreed upon by patient and/or caregiver.)    (1.) Tiffanie Phillips  will move from supine to sit and sit to supine  and roll side to side with INDEPENDENT within 7 treatment day(s).    (2.) Tiffanie Phillips will transfer from bed to chair and chair to bed with MODIFIED INDEPENDENCE using the least restrictive device within 7 treatment day(s).    (3.) Tiffanie Phillips will ambulate with MODIFIED INDEPENDENCE for 500 feet with the least restrictive device within 7 treatment day(s).   (4.) Tiffanie Phillips will perform standing static and dynamic balance activities x 10 minutes with MODIFIED INDEPENDENCE to improve safety within 7 treatment day(s).  (5.) Tiffanie Phillips will ascend and descend 3 stairs using 1 hand rail(s) with MODIFIED INDEPENDENCE to improve functional mobility and safety within 7 treatment day(s).  (6.) Tiffanie Phillips will perform therapeutic exercises x 10 min for HEP with MODIFIED INDEPENDENCE to improve strength, endurance, and functional mobility within 7 treatment day(s).          PHYSICAL THERAPY: Daily Note PM   (Link to Caseload Tracking: PT Visit Days : 2  Time In/Out PT Charge Capture  Rehab Caseload Tracker  Orders    Tiffanie Phillips is a 54 y.o. female   PRIMARY DIAGNOSIS: Myelomalacia (HCC)  Cervical myelopathy (HCC) [G95.9]  Cervical spinal stenosis [M48.02]  Myelomalacia (HCC) [G95.89]  S/P cervical spinal fusion [Z98.1]  Procedure(s) (LRB):  C4-C7 posterior cervical laminectomy with instrumented fixation and lateral mass arthrodesis. (N/A)  2 Days Post-Op  Inpatient: Payor: [employer]ET[employer] / Plan: [employer]ET[employer] NAP CHOICE POS II / Product Type: *No Product type* /     ASSESSMENT:     REHAB RECOMMENDATIONS:   Recommendation to date pending progress:  Setting:  Outpatient Therapy    Equipment:    Rolling Walker     ASSESSMENT:  Ms. Phillips is a 54 year old female admitted to the hospital on 4/16/24 POD #1 C4-C7

## 2024-04-18 NOTE — PROGRESS NOTES
[facility]    Assessment: S/P C4-7 posterior cervical fusion    POD#2    Plan:  -aspen collar for comfort  -PT to mobilize  -monitor drain output    Subjective:    Objective:  Afebrile  VSS  GCS15  Moving all extremities  Incision CDI  Drain output yesterday 330cc    Patient Vitals for the past 12 hrs:   Temp Pulse Resp BP SpO2   04/18/24 0837 98.2 °F (36.8 °C) (!) 106 18 (!) 143/77 95 %          No results found for this or any previous visit (from the past 24 hour(s)).       Suzy Garcia, APRN - CNP

## 2024-04-18 NOTE — PLAN OF CARE
Problem: Pain  Goal: Verbalizes/displays adequate comfort level or baseline comfort level  4/17/2024 2239 by Simin Negro RN  Outcome: Progressing  4/17/2024 1136 by Margaux Jimenez RN  Outcome: Progressing     Problem: Safety - Adult  Goal: Free from fall injury  4/17/2024 2239 by Simin Negro RN  Outcome: Progressing  4/17/2024 1136 by Margaux Jimenez RN  Outcome: Progressing     Problem: Discharge Planning  Goal: Discharge to home or other facility with appropriate resources  4/17/2024 2239 by Simin Negro RN  Outcome: Progressing  Flowsheets (Taken 4/17/2024 1937)  Discharge to home or other facility with appropriate resources: Identify barriers to discharge with patient and caregiver  4/17/2024 1136 by Margaux Jimenez RN  Outcome: Progressing     Problem: ABCDS Injury Assessment  Goal: Absence of physical injury  4/17/2024 2239 by Simin Negro RN  Outcome: Progressing  4/17/2024 1136 by Margaux Jimenez RN  Outcome: Progressing     Problem: Neurosensory - Adult  Goal: Achieves stable or improved neurological status  Outcome: Progressing  Flowsheets (Taken 4/17/2024 1937)  Achieves stable or improved neurological status: Assess for and report changes in neurological status     Problem: Respiratory - Adult  Goal: Achieves optimal ventilation and oxygenation  Outcome: Progressing  Flowsheets (Taken 4/17/2024 1937)  Achieves optimal ventilation and oxygenation: Assess for changes in respiratory status     Problem: Cardiovascular - Adult  Goal: Maintains optimal cardiac output and hemodynamic stability  Outcome: Progressing  Flowsheets (Taken 4/17/2024 1937)  Maintains optimal cardiac output and hemodynamic stability: Monitor blood pressure and heart rate     Problem: Musculoskeletal - Adult  Goal: Return mobility to safest level of function  Outcome: Progressing  Flowsheets (Taken 4/17/2024 1937)  Return Mobility to Safest Level of Function: Assess patient stability and activity tolerance for standing,

## 2024-04-18 NOTE — PROGRESS NOTES
ACUTE PHYSICAL THERAPY GOALS:   (Developed with and agreed upon by patient and/or caregiver.)    (1.) Tiffanie Phillips  will move from supine to sit and sit to supine  and roll side to side with INDEPENDENT within 7 treatment day(s).    (2.) Tiffanie Phillips will transfer from bed to chair and chair to bed with MODIFIED INDEPENDENCE using the least restrictive device within 7 treatment day(s).    (3.) Tiffanie Phillips will ambulate with MODIFIED INDEPENDENCE for 500 feet with the least restrictive device within 7 treatment day(s).   (4.) Tiffanie Phillips will perform standing static and dynamic balance activities x 10 minutes with MODIFIED INDEPENDENCE to improve safety within 7 treatment day(s).  (5.) Tiffanie Phillips will ascend and descend 3 stairs using 1 hand rail(s) with MODIFIED INDEPENDENCE to improve functional mobility and safety within 7 treatment day(s).  (6.) Tiffanie Phillips will perform therapeutic exercises x 10 min for HEP with MODIFIED INDEPENDENCE to improve strength, endurance, and functional mobility within 7 treatment day(s).          PHYSICAL THERAPY: Daily Note AM   (Link to Caseload Tracking: PT Visit Days : 2  Time In/Out PT Charge Capture  Rehab Caseload Tracker  Orders    Tiffanie Phillips is a 54 y.o. female   PRIMARY DIAGNOSIS: Myelomalacia (HCC)  Cervical myelopathy (HCC) [G95.9]  Cervical spinal stenosis [M48.02]  Myelomalacia (HCC) [G95.89]  S/P cervical spinal fusion [Z98.1]  Procedure(s) (LRB):  C4-C7 posterior cervical laminectomy with instrumented fixation and lateral mass arthrodesis. (N/A)  2 Days Post-Op  Inpatient: Payor: AETNA / Plan: AET[employer] NAP CHOICE POS II / Product Type: *No Product type* /     ASSESSMENT:     REHAB RECOMMENDATIONS:   Recommendation to date pending progress:  Setting:  Outpatient Therapy    Equipment:    Rolling Walker     ASSESSMENT:  Ms. Phillips is a 54 year old female admitted to the hospital on 4/16/24 POD #1 C4-C7

## 2024-04-18 NOTE — CARE COORDINATION
Therapy has updated their recommendation from HH to OP therapy.  Referral was sent to [facility] OP services and information was added to follow-up section of chart for review at discharge.

## 2024-04-19 PROCEDURE — 2500000003 HC RX 250 WO HCPCS: Performed by: NEUROLOGICAL SURGERY

## 2024-04-19 PROCEDURE — 6370000000 HC RX 637 (ALT 250 FOR IP): Performed by: NEUROLOGICAL SURGERY

## 2024-04-19 PROCEDURE — 97530 THERAPEUTIC ACTIVITIES: CPT

## 2024-04-19 PROCEDURE — 97535 SELF CARE MNGMENT TRAINING: CPT

## 2024-04-19 PROCEDURE — 97110 THERAPEUTIC EXERCISES: CPT

## 2024-04-19 PROCEDURE — 2580000003 HC RX 258: Performed by: NEUROLOGICAL SURGERY

## 2024-04-19 PROCEDURE — 1100000000 HC RM PRIVATE

## 2024-04-19 RX ORDER — OXYCODONE HYDROCHLORIDE 10 MG/1
10 TABLET ORAL EVERY 6 HOURS PRN
Qty: 20 TABLET | Refills: 0 | Status: SHIPPED | OUTPATIENT
Start: 2024-04-19 | End: 2024-04-24

## 2024-04-19 RX ORDER — CYCLOBENZAPRINE HCL 10 MG
10 TABLET ORAL 3 TIMES DAILY PRN
Qty: 30 TABLET | Refills: 0 | Status: SHIPPED | OUTPATIENT
Start: 2024-04-19 | End: 2024-04-29

## 2024-04-19 RX ORDER — NALOXONE HYDROCHLORIDE 4 MG/.1ML
1 SPRAY NASAL PRN
Qty: 1 EACH | Refills: 0 | Status: SHIPPED | OUTPATIENT
Start: 2024-04-19

## 2024-04-19 RX ADMIN — OXYCODONE 10 MG: 5 TABLET ORAL at 10:44

## 2024-04-19 RX ADMIN — ACETAMINOPHEN 650 MG: 325 TABLET ORAL at 13:33

## 2024-04-19 RX ADMIN — POLYETHYLENE GLYCOL 3350 17 G: 17 POWDER, FOR SOLUTION ORAL at 08:30

## 2024-04-19 RX ADMIN — OXYCODONE 10 MG: 5 TABLET ORAL at 21:16

## 2024-04-19 RX ADMIN — OXYCODONE 10 MG: 5 TABLET ORAL at 02:26

## 2024-04-19 RX ADMIN — CYCLOBENZAPRINE 10 MG: 10 TABLET, FILM COATED ORAL at 17:05

## 2024-04-19 RX ADMIN — SODIUM CHLORIDE, PRESERVATIVE FREE 10 ML: 5 INJECTION INTRAVENOUS at 21:17

## 2024-04-19 RX ADMIN — GABAPENTIN 300 MG: 300 CAPSULE ORAL at 13:32

## 2024-04-19 RX ADMIN — GABAPENTIN 300 MG: 300 CAPSULE ORAL at 21:17

## 2024-04-19 RX ADMIN — ACETAMINOPHEN 650 MG: 325 TABLET ORAL at 08:29

## 2024-04-19 RX ADMIN — ACETAMINOPHEN 650 MG: 325 TABLET ORAL at 17:05

## 2024-04-19 RX ADMIN — OXYCODONE 10 MG: 5 TABLET ORAL at 14:39

## 2024-04-19 RX ADMIN — GABAPENTIN 300 MG: 300 CAPSULE ORAL at 08:30

## 2024-04-19 RX ADMIN — DOCUSATE SODIUM 50 MG AND SENNOSIDES 8.6 MG 1 TABLET: 8.6; 5 TABLET, FILM COATED ORAL at 21:17

## 2024-04-19 RX ADMIN — CYCLOBENZAPRINE 10 MG: 10 TABLET, FILM COATED ORAL at 08:30

## 2024-04-19 RX ADMIN — HYDROMORPHONE HYDROCHLORIDE 0.5 MG: 1 INJECTION, SOLUTION INTRAMUSCULAR; INTRAVENOUS; SUBCUTANEOUS at 06:46

## 2024-04-19 RX ADMIN — HYDROMORPHONE HYDROCHLORIDE 0.5 MG: 1 INJECTION, SOLUTION INTRAMUSCULAR; INTRAVENOUS; SUBCUTANEOUS at 00:59

## 2024-04-19 RX ADMIN — DOCUSATE SODIUM 50 MG AND SENNOSIDES 8.6 MG 1 TABLET: 8.6; 5 TABLET, FILM COATED ORAL at 08:30

## 2024-04-19 RX ADMIN — SODIUM CHLORIDE, PRESERVATIVE FREE 10 ML: 5 INJECTION INTRAVENOUS at 08:30

## 2024-04-19 ASSESSMENT — PAIN DESCRIPTION - FREQUENCY
FREQUENCY: INTERMITTENT

## 2024-04-19 ASSESSMENT — PAIN DESCRIPTION - ORIENTATION
ORIENTATION: POSTERIOR
ORIENTATION: ANTERIOR;POSTERIOR
ORIENTATION: POSTERIOR
ORIENTATION: POSTERIOR

## 2024-04-19 ASSESSMENT — PAIN SCALES - GENERAL
PAINLEVEL_OUTOF10: 10
PAINLEVEL_OUTOF10: 0
PAINLEVEL_OUTOF10: 6
PAINLEVEL_OUTOF10: 7
PAINLEVEL_OUTOF10: 0
PAINLEVEL_OUTOF10: 3
PAINLEVEL_OUTOF10: 9
PAINLEVEL_OUTOF10: 8
PAINLEVEL_OUTOF10: 3
PAINLEVEL_OUTOF10: 3
PAINLEVEL_OUTOF10: 0
PAINLEVEL_OUTOF10: 5
PAINLEVEL_OUTOF10: 8
PAINLEVEL_OUTOF10: 0
PAINLEVEL_OUTOF10: 8

## 2024-04-19 ASSESSMENT — PAIN DESCRIPTION - LOCATION
LOCATION: NECK

## 2024-04-19 ASSESSMENT — PAIN - FUNCTIONAL ASSESSMENT
PAIN_FUNCTIONAL_ASSESSMENT: ACTIVITIES ARE NOT PREVENTED

## 2024-04-19 ASSESSMENT — PAIN DESCRIPTION - PAIN TYPE
TYPE: SURGICAL PAIN

## 2024-04-19 ASSESSMENT — PAIN DESCRIPTION - ONSET
ONSET: ON-GOING

## 2024-04-19 ASSESSMENT — PAIN DESCRIPTION - DESCRIPTORS
DESCRIPTORS: ACHING
DESCRIPTORS: ACHING
DESCRIPTORS: BURNING
DESCRIPTORS: ACHING
DESCRIPTORS: BURNING;ACHING
DESCRIPTORS: ACHING
DESCRIPTORS: ACHING
DESCRIPTORS: BURNING;ACHING
DESCRIPTORS: BURNING;ACHING

## 2024-04-19 NOTE — DISCHARGE SUMMARY
NEUROSURGERY DISCHARGE SUMMARY     Patient Name: Tiffanie Phillips  Patient MRN: [id number]  Date of Admission: 4/16/2024  Date of Discharge: 4/19/2024   Length of Hospital Stay: 3    Pre-Procedure Diagnosis:    * Cervical myelopathy (HCC) [G95.9]     * Cervical spinal stenosis [M48.02]     * Myelomalacia (HCC) [G95.89]  Post-Procedure Diagnosis: SAME AS ABOVE     Admitting Attending: Kervin Mueller MD   Discharge Attending: KIMBERLEE Ruvalcaba - CNP    Past Medical History:   Diagnosis Date    Adverse effect of anesthesia     BP dropped during a colonoscopy x 1 [facility], no problem with other colonoscopy    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 performed by Kervin Mueller MD at [facility] MAIN OR    COLONOSCOPY      COLONOSCOPY N/A 10/20/2022    COLONOSCOPY POLYPECTOMY SNARE/COLD BIOPSY performed by Rula Hurley MD at [facility] ENDOSCOPY    GYN  1990s    laparoscopy -endometroisis removal    HEENT      right ear, stapes replaced    ORTHOPEDIC SURGERY      carpal tunnel release, right    ORTHOPEDIC SURGERY Right     foot surgery     No Known Allergies    Procedures Performed During Hospitalization:   1. C4-C7 posterior cervical instrumented fixation and fusion (K2-[place] and DBM)   2. C4-C7 Laminectomies for decompression   3. Bilateral C4-C7 arthrodesis and posteroloateral fusion with morselized autograft and supplemented with DBM  4. Continuous multimodality

## 2024-04-19 NOTE — PROGRESS NOTES
ACUTE PHYSICAL THERAPY GOALS:   (Developed with and agreed upon by patient and/or caregiver.)    (1.) Tiffanie Phillips  will move from supine to sit and sit to supine  and roll side to side with INDEPENDENT within 7 treatment day(s).    (2.) Tiffanie Phillips will transfer from bed to chair and chair to bed with MODIFIED INDEPENDENCE using the least restrictive device within 7 treatment day(s).    (3.) Tiffanie Phillips will ambulate with MODIFIED INDEPENDENCE for 500 feet with the least restrictive device within 7 treatment day(s).   (4.) Tiffanie Phillips will perform standing static and dynamic balance activities x 10 minutes with MODIFIED INDEPENDENCE to improve safety within 7 treatment day(s).  (5.) Tiffanie Phillips will ascend and descend 3 stairs using 1 hand rail(s) with MODIFIED INDEPENDENCE to improve functional mobility and safety within 7 treatment day(s).  (6.) Tiffanie Phillips will perform therapeutic exercises x 10 min for HEP with MODIFIED INDEPENDENCE to improve strength, endurance, and functional mobility within 7 treatment day(s).          PHYSICAL THERAPY: Daily Note PM   (Link to Caseload Tracking: PT Visit Days : 3  Time In/Out PT Charge Capture  Rehab Caseload Tracker  Orders    Tiffanie Phillips is a 54 y.o. female   PRIMARY DIAGNOSIS: Myelomalacia (HCC)  Cervical myelopathy (HCC) [G95.9]  Cervical spinal stenosis [M48.02]  Myelomalacia (HCC) [G95.89]  S/P cervical spinal fusion [Z98.1]  Procedure(s) (LRB):  C4-C7 posterior cervical laminectomy with instrumented fixation and lateral mass arthrodesis. (N/A)  3 Days Post-Op  Inpatient: Payor: [employer] / Plan: [employer] NAP CHOICE POS II / Product Type: *No Product type* /     ASSESSMENT:     REHAB RECOMMENDATIONS:   Recommendation to date pending progress:  Setting:  Outpatient Therapy    Equipment:    Rolling Walker     ASSESSMENT:  Ms. Phillips is a 54 year old female admitted to the hospital on 4/16/24 POD #1 C4-C7  posterior cervical laminectomy and instrumented fixation and lateral mass arthrodesis.     This afternoon, pt is moving better and sat to edge of bed with SBA.  She has her Aspen collar on, mother at bedside.  She ambulated 500' with rolling walker and SBA.  She returned to chair in room and was left up with needs in reach.  She is making progress towards goals. Will continue with POC.     SUBJECTIVE:   Ms. Phillips states, \"I'm good\"     Social/Functional Lives With: Significant other, Parent  Type of Home: House  Home Layout: Two level, Able to Live on Main level with bedroom/bathroom  Home Access: Stairs to enter with rails  Entrance Stairs - Number of Steps: 3  Home Equipment: None  Has the patient had two or more falls in the past year or any fall with injury in the past year?: Yes  Receives Help From: Family  Ambulation Assistance: Independent  Transfer Assistance: Independent  OBJECTIVE:     PAIN: VITALS / O2: PRECAUTION / LINES / DRAINS:   Pre Treatment:   Pain Assessment: 0-10  Pain Level: 6  Patient's Stated Pain Goal: 0 - No pain  Pain Location: Neck        Post Treatment: Same as above  Vitals        Oxygen    Huntley Catheter    RESTRICTIONS/PRECAUTIONS:  Restrictions/Precautions  Restrictions/Precautions: Fall Risk  Required Braces or Orthoses?: Yes  Position Activity Restriction  Spinal Precautions: No Bending, No Lifting, No Twisting  Restrictions/Precautions: Fall Risk  Required Braces or Orthoses?: Yes  Required Braces or Orthoses  Cervical:  (Aspen collar)     MOBILITY: I Mod I S SBA CGA Min Mod Max Total  NT x2 Comments:   Bed Mobility    Rolling [] [] [] [x] [] [] [] [] [] [] []    Supine to Sit [] [] [] [x] [] [] [] [] [] [] [] Log rolling technique   Scooting [] [] [] [x] [] [] [] [] [] [] []    Sit to Supine [] [] [] [] [] [] [] [] [] [] []    Transfers    Sit to Stand [] [] [] [x] [] [] [] [] [] [] []    Bed to Chair [] [] [] [x] [] [] [] [] [] [] []    Stand to Sit [] [] [] [x] [] [] [] [] [] []

## 2024-04-19 NOTE — PROGRESS NOTES
[facility]    Assessment: S/P C4-7 posterior cervical fusion    POD#3    Plan:  -aspen collar for comfort  -PT to mobilize  -monitor drain output    Subjective:    Objective:  Afebrile  VSS  GCS15  Moving all extremities  Incision CDI  Drain output yesterday 55cc    Patient Vitals for the past 12 hrs:   Temp Pulse Resp BP SpO2   04/19/24 0832 97.7 °F (36.5 °C) (!) 110 18 115/70 95 %   04/19/24 0320 97.9 °F (36.6 °C) (!) 102 18 102/63 97 %   04/19/24 0256 -- -- 16 -- --   04/19/24 0129 -- -- 18 -- --   04/18/24 2250 -- -- 17 -- --          No results found for this or any previous visit (from the past 24 hour(s)).       Suzy Garcia, APRN - CNP

## 2024-04-19 NOTE — PROGRESS NOTES
ACUTE OCCUPATIONAL THERAPY GOALS:   (Developed with and agreed upon by patient and/or caregiver.)    1. Patient will perform lower body dressing with stand by assist with adaptive equipment as needed.  2. Patient will perform upper and lower body bathing with stand by assist with adaptive equipment as needed.  3. Patient will perform toilet transfers with stand by assist.  4. Patient will participate in 30 + minutes of ADL/ therapeutic exercise/therapeutic activity with min rest breaks to increase activity tolerance for self care.  5. Patient will perform standing grooming tasks x5 mins with stand by assist.  6. Patient will perform ADL functional mobility in room with stand by assist.  OCCUPATIONAL THERAPY: Daily Note AM   OT Visit Days: 2   Time In/Out  OT Charge Capture  Rehab Caseload Tracker  OT Orders    Tiffanie Phillips is a 54 y.o. female   PRIMARY DIAGNOSIS: Myelomalacia (HCC)  Cervical myelopathy (HCC) [G95.9]  Cervical spinal stenosis [M48.02]  Myelomalacia (HCC) [G95.89]  S/P cervical spinal fusion [Z98.1]  Procedure(s) (LRB):  C4-C7 posterior cervical laminectomy with instrumented fixation and lateral mass arthrodesis. (N/A)  3 Days Post-Op  Inpatient: Payor: AETNA / Plan: AETNA NAP CHOICE POS II / Product Type: *No Product type* /     ASSESSMENT:     REHAB RECOMMENDATIONS:   Recommendation to date pending progress:  Setting:  No further skilled occupational therapy after discharge from hospital    Equipment:    None     ASSESSMENT:  Ms. Phillips presents in supine upon arrival and transferred to sitting with contact guard assistance. Pt has limitations with self feeding d/t jaw pain and d/t pain increasing in her neck with shoulder flexion. Pt stated that her Mother has been assisted with feeding d/t above. Pt completed functional mobility in the room with standby assist and brushed her teeth at the sink with mod I. Pt's lunch came and this writer assisted pt with an adaptive technique including

## 2024-04-19 NOTE — PROGRESS NOTES
ACUTE PHYSICAL THERAPY GOALS:   (Developed with and agreed upon by patient and/or caregiver.)    (1.) Tiffanie Phillips  will move from supine to sit and sit to supine  and roll side to side with INDEPENDENT within 7 treatment day(s).    (2.) Tiffanie Phillips will transfer from bed to chair and chair to bed with MODIFIED INDEPENDENCE using the least restrictive device within 7 treatment day(s).    (3.) Tiffanie Phillips will ambulate with MODIFIED INDEPENDENCE for 500 feet with the least restrictive device within 7 treatment day(s).   (4.) Tiffanie Phillips will perform standing static and dynamic balance activities x 10 minutes with MODIFIED INDEPENDENCE to improve safety within 7 treatment day(s).  (5.) Tiffanie Phillips will ascend and descend 3 stairs using 1 hand rail(s) with MODIFIED INDEPENDENCE to improve functional mobility and safety within 7 treatment day(s).  (6.) Tiffanie Phillips will perform therapeutic exercises x 10 min for HEP with MODIFIED INDEPENDENCE to improve strength, endurance, and functional mobility within 7 treatment day(s).          PHYSICAL THERAPY: Daily Note AM   (Link to Caseload Tracking: PT Visit Days : 3  Time In/Out PT Charge Capture  Rehab Caseload Tracker  Orders    Tiffanie Phillips is a 54 y.o. female   PRIMARY DIAGNOSIS: Myelomalacia (HCC)  Cervical myelopathy (HCC) [G95.9]  Cervical spinal stenosis [M48.02]  Myelomalacia (HCC) [G95.89]  S/P cervical spinal fusion [Z98.1]  Procedure(s) (LRB):  C4-C7 posterior cervical laminectomy with instrumented fixation and lateral mass arthrodesis. (N/A)  3 Days Post-Op  Inpatient: Payor: AETNA / Plan: AETNA NAP CHOICE POS II / Product Type: *No Product type* /     ASSESSMENT:     REHAB RECOMMENDATIONS:   Recommendation to date pending progress:  Setting:  Outpatient Therapy    Equipment:    Rolling Walker     ASSESSMENT:  Ms. Phillips is a 54 year old female admitted to the hospital on 4/16/24 POD #1 C4-C7    LAQ X 10 B 2 X 10 B    Hip flexion X 10 B X 10 B    Hip abd X 10 B X 10 B                          AFTER TREATMENT PRECAUTIONS: Bed/Chair Locked, Call light within reach, Chair, Needs within reach, RN notified, and Visitors at bedside    INTERDISCIPLINARY COLLABORATION:  RN/ PCT and PT/ PTA      TIME IN/OUT:  Time In: 0940  Time Out: 1012  Minutes: 32    [address] PTA

## 2024-04-19 NOTE — CARE COORDINATION
Patient is medically ready to discharge per attending.  CM updated patient's insurance company regarding discharge plan for OP PT with [facility].  Referral has been sent and received.

## 2024-04-19 NOTE — PLAN OF CARE
Problem: Pain  Goal: Verbalizes/displays adequate comfort level or baseline comfort level  Outcome: Progressing     Problem: Safety - Adult  Goal: Free from fall injury  Outcome: Progressing     Problem: Discharge Planning  Goal: Discharge to home or other facility with appropriate resources  Outcome: Progressing     Problem: ABCDS Injury Assessment  Goal: Absence of physical injury  Outcome: Progressing     Problem: Neurosensory - Adult  Goal: Achieves stable or improved neurological status  Outcome: Progressing     Problem: Respiratory - Adult  Goal: Achieves optimal ventilation and oxygenation  Outcome: Progressing     Problem: Cardiovascular - Adult  Goal: Maintains optimal cardiac output and hemodynamic stability  Outcome: Progressing     Problem: Musculoskeletal - Adult  Goal: Return mobility to safest level of function  Outcome: Progressing     Problem: Gastrointestinal - Adult  Goal: Minimal or absence of nausea and vomiting  Outcome: Progressing     Problem: Infection - Adult  Goal: Absence of infection at discharge  Outcome: Progressing     Problem: Metabolic/Fluid and Electrolytes - Adult  Goal: Electrolytes maintained within normal limits  Outcome: Progressing  Goal: Hemodynamic stability and optimal renal function maintained  Outcome: Progressing  Goal: Glucose maintained within prescribed range  Outcome: Progressing     Problem: Hematologic - Adult  Goal: Maintains hematologic stability  Outcome: Progressing

## 2024-04-20 VITALS
DIASTOLIC BLOOD PRESSURE: 66 MMHG | OXYGEN SATURATION: 100 % | RESPIRATION RATE: 20 BRPM | HEART RATE: 115 BPM | WEIGHT: 178 LBS | HEIGHT: 66 IN | TEMPERATURE: 99.3 F | SYSTOLIC BLOOD PRESSURE: 107 MMHG | BODY MASS INDEX: 28.61 KG/M2

## 2024-04-20 PROCEDURE — 97530 THERAPEUTIC ACTIVITIES: CPT

## 2024-04-20 PROCEDURE — 6370000000 HC RX 637 (ALT 250 FOR IP): Performed by: NEUROLOGICAL SURGERY

## 2024-04-20 PROCEDURE — 2580000003 HC RX 258: Performed by: NEUROLOGICAL SURGERY

## 2024-04-20 RX ADMIN — ACETAMINOPHEN 650 MG: 325 TABLET ORAL at 08:45

## 2024-04-20 RX ADMIN — POLYETHYLENE GLYCOL 3350 17 G: 17 POWDER, FOR SOLUTION ORAL at 08:44

## 2024-04-20 RX ADMIN — OXYCODONE 10 MG: 5 TABLET ORAL at 06:22

## 2024-04-20 RX ADMIN — CYCLOBENZAPRINE 10 MG: 10 TABLET, FILM COATED ORAL at 10:11

## 2024-04-20 RX ADMIN — ACETAMINOPHEN 650 MG: 325 TABLET ORAL at 02:15

## 2024-04-20 RX ADMIN — DOCUSATE SODIUM 50 MG AND SENNOSIDES 8.6 MG 1 TABLET: 8.6; 5 TABLET, FILM COATED ORAL at 08:45

## 2024-04-20 RX ADMIN — SODIUM CHLORIDE, PRESERVATIVE FREE 5 ML: 5 INJECTION INTRAVENOUS at 08:45

## 2024-04-20 RX ADMIN — GABAPENTIN 300 MG: 300 CAPSULE ORAL at 08:45

## 2024-04-20 RX ADMIN — CYCLOBENZAPRINE 10 MG: 10 TABLET, FILM COATED ORAL at 02:15

## 2024-04-20 ASSESSMENT — PAIN SCALES - GENERAL
PAINLEVEL_OUTOF10: 9
PAINLEVEL_OUTOF10: 7
PAINLEVEL_OUTOF10: 6

## 2024-04-20 ASSESSMENT — PAIN DESCRIPTION - DESCRIPTORS: DESCRIPTORS: ACHING

## 2024-04-20 ASSESSMENT — PAIN DESCRIPTION - LOCATION: LOCATION: NECK

## 2024-04-20 ASSESSMENT — PAIN DESCRIPTION - ORIENTATION: ORIENTATION: POSTERIOR

## 2024-04-20 NOTE — PROGRESS NOTES
ACUTE PHYSICAL THERAPY GOALS:   (Developed with and agreed upon by patient and/or caregiver.)    (1.) Tiffanie Phillips  will move from supine to sit and sit to supine  and roll side to side with INDEPENDENT within 7 treatment day(s).    (2.) Tiffanie Phillips will transfer from bed to chair and chair to bed with MODIFIED INDEPENDENCE using the least restrictive device within 7 treatment day(s).    (3.) Tiffanie Phillips will ambulate with MODIFIED INDEPENDENCE for 500 feet with the least restrictive device within 7 treatment day(s).   (4.) Tiffanie Phillips will perform standing static and dynamic balance activities x 10 minutes with MODIFIED INDEPENDENCE to improve safety within 7 treatment day(s).  (5.) Tiffanie Phillips will ascend and descend 3 stairs using 1 hand rail(s) with MODIFIED INDEPENDENCE to improve functional mobility and safety within 7 treatment day(s).  (6.) Tiffanie Phillips will perform therapeutic exercises x 10 min for HEP with MODIFIED INDEPENDENCE to improve strength, endurance, and functional mobility within 7 treatment day(s).          PHYSICAL THERAPY: Daily Note AM   (Link to Caseload Tracking: PT Visit Days : 4  Time In/Out PT Charge Capture  Rehab Caseload Tracker  Orders    Tiffanie Phillips is a 54 y.o. female   PRIMARY DIAGNOSIS: Myelomalacia (HCC)  Cervical myelopathy (HCC) [G95.9]  Cervical spinal stenosis [M48.02]  Myelomalacia (HCC) [G95.89]  S/P cervical spinal fusion [Z98.1]  Procedure(s) (LRB):  C4-C7 posterior cervical laminectomy with instrumented fixation and lateral mass arthrodesis. (N/A)  4 Days Post-Op  Inpatient: Payor: AETNA / Plan: AETNA NAP CHOICE POS II / Product Type: *No Product type* /     ASSESSMENT:     REHAB RECOMMENDATIONS:   Recommendation to date pending progress:  Setting:  Outpatient Therapy    Equipment:    Rolling Walker     ASSESSMENT:  Ms. Phillips is a 54 year old female admitted to the hospital on 4/16/24 POD #1 C4-C7

## 2024-04-20 NOTE — PROGRESS NOTES
Discharge instructions provided to patient. All questions and concerns addressed. All lines and drains removed. Patient d/c home with family.

## 2024-04-20 NOTE — PROGRESS NOTES
Neurosurgery Progress Note      Subjective    Ms. Phillips is hospital day 4, and is post op day: 4 Days Post-Op status post Procedure(s):  C4-C7 posterior cervical laminectomy with instrumented fixation and lateral mass arthrodesis. for Myelomalacia (HCC)  She is feeling much better.  Her neck pain is manageable with her p.o. regimen.  She states that her upper extremity symptomatology has essentially resolved but is still having some pain and numbness in her back and lower extremities.      Objective    Neurologic Exam   Physical Exam   On exam she is awake alert in no acute distress.  Her collar is fitting very well.  She neurologically is at baseline.    Her drain has put out nothing the last 2 checks.    Assessment & Plan    Postop day 4 status post posterior cervical decompression and fusion.  She is doing well and is wanting to go home her drain is putting out nothing so we will get the drain out and let her go home.    On 04/20/24 I have spent 20 minutes reviewing previous notes, test results and face to face with the patient ( and any present family or support) discussing the diagnosis and plan. I have all answered questions to their satisfaction.     This note was created using voice recognition software.  All attempts were made to recognize and correct voice recognition errors. Unfortunately some errors may persist.    CLEMENTINE Newton MD FAANS

## 2024-04-20 NOTE — CARE COORDINATION
Pt is for discharge home today with family. Referral sent to  out pt rehab for PT. No other needs/supportive care orders recieved for CM at this time.    04/20/24 1226   Service Assessment   Patient's Healthcare Decision Maker is: Legal Next of Kin   Social/Functional History   Lives With Significant other;Parent   Type of Home House   Home Layout Two level;Able to Live on Main level with bedroom/bathroom   Home Access Stairs to enter with rails   Entrance Stairs - Number of Steps 3   Home Equipment None   Services At/After Discharge   Transition of Care Consult (CM Consult) Discharge Planning   Services At/After Discharge Outpatient;PT  ( OP PT)   [place] Resource Information Provided? No   Mode of Transport at Discharge Other (see comment)  (family)   Confirm Follow Up Transport Family   Condition of Participation: Discharge Planning   The Plan for Transition of Care is related to the following treatment goals: Pt will return home with out pt therapy referral.   The Patient and/or Patient Representative was provided with a Choice of Provider? Patient   The Patient and/Or Patient Representative agree with the Discharge Plan? Yes   Freedom of Choice list was provided with basic dialogue that supports the patient's individualized plan of care/goals, treatment preferences, and shares the quality data associated with the providers?  Yes

## 2024-04-22 ENCOUNTER — PATIENT MESSAGE (OUTPATIENT)
Dept: NEUROSURGERY | Age: 54
End: 2024-04-22

## 2024-04-22 NOTE — TELEPHONE ENCOUNTER
From: Tiffanie Phillips  To: Dr. Kervin Mueller  Sent: 4/22/2024 2:59 PM EDT  Subject: Pt    Should I go to pt or wait until I see Dr. Mueller

## 2024-04-22 NOTE — TELEPHONE ENCOUNTER
From: Tiffanie Phillips  To: Dr. Kervin Mueller  Sent: 4/22/2024 12:34 PM EDT  Subject: Compression stockings    Good afternoon, do I need to keep wearing compression stockings from hospital?

## 2024-04-25 ENCOUNTER — PATIENT MESSAGE (OUTPATIENT)
Dept: NEUROSURGERY | Age: 54
End: 2024-04-25

## 2024-04-25 NOTE — TELEPHONE ENCOUNTER
From: Tiffanie Phillips  To: Dr. Kervin Mueller  Sent: 4/25/2024 3:55 PM EDT  Subject: Hair washing    I hate to ask this dumb question but how am I supposed to wash my hair

## 2024-04-26 ENCOUNTER — HOSPITAL ENCOUNTER (OUTPATIENT)
Dept: PHYSICAL THERAPY | Age: 54
Setting detail: RECURRING SERIES
Discharge: HOME | End: 2024-04-29
Payer: COMMERCIAL

## 2024-04-26 DIAGNOSIS — M48.02 SPINAL STENOSIS, CERVICAL REGION: Primary | ICD-10-CM

## 2024-04-26 DIAGNOSIS — M43.22 FUSION OF SPINE, CERVICAL REGION: ICD-10-CM

## 2024-04-26 PROCEDURE — 97110 THERAPEUTIC EXERCISES: CPT

## 2024-04-26 PROCEDURE — 97161 PT EVAL LOW COMPLEX 20 MIN: CPT

## 2024-04-26 NOTE — PROGRESS NOTES
Tiffanie Mendoza Alan  : 1970  Primary: [employer])  Secondary:  [facility]  [address] [place] [address]  Phone: [phone]  Fax: [phone] Plan Frequency: 2x/week for 90 days    Plan of Care/Certification Expiration Date: 24        Plan of Care/Certification Expiration Date:  Plan of Care/Certification Expiration Date: 24    Frequency/Duration:   Plan Frequency: 2x/week for 90 days      Time In/Out:   Time In: 1240  Time Out: 1320      PT Visit Info:         Visit Count:  1    OUTPATIENT PHYSICAL THERAPY:   Treatment Note 2024       Episode  (s/p Cervical Fusion)               Treatment Diagnosis:     Spinal stenosis, cervical region  Fusion of spine, cervical region  Medical/Referring Diagnosis:    Cervical spinal stenosis  S/P cervical spinal fusion      Referring Physician:  Kervin Mueller MD MD Orders:  PT Eval and Treat   Return MD Appt:     Date of Onset:  24  Allergies:   Patient has no known allergies.  Restrictions/Precautions:   Post Surgical Precautions: stay in C-collar at all times in therapy initially  Cervical collar, focus on LE strengthening/balance/gait      Interventions Planned (Treatment may consist of any combination of the following):     See Assessment Note    Subjective Comments:   See Evaluation Note from today  Initial Pain Level::      /10  Post Session Pain Level:        /10  Medications Last Reviewed:  2024  Updated Objective Findings:  See Evaluation Note from today  Treatment   THERAPEUTIC EXERCISE: (25 minutes):    Exercises per grid below to improve mobility, strength, and coordination.  Required minimal visual, verbal, manual, and tactile cues to promote proper body posture and promote proper body mechanics.  Progressed resistance, range, repetitions, and complexity of movement as indicated.     Date:       Activity/Exercise Parameters     Education Plan of car,

## 2024-04-26 NOTE — THERAPY EVALUATION
Tiffanie Phillips  : 1970  Primary: [employer]  Secondary:  [facility]  [address] [place] [address]  Phone: [phone]  Fax: [phone] Plan Frequency: 2x/week for 90 days    Plan of Care/Certification Expiration Date: 24        Plan of Care/Certification Expiration Date:  Plan of Care/Certification Expiration Date: 24    Frequency/Duration: Plan Frequency: 2x/week for 90 days      Time In/Out:          PT Visit Info:         Visit Count:  1                OUTPATIENT PHYSICAL THERAPY:             Initial Assessment 2024               Episode (s/p Cervical Fusion)         Treatment Diagnosis:     Spinal stenosis, cervical region  Fusion of spine, cervical region    Medical/Referring Diagnosis:    Cervical spinal stenosis  S/P cervical spinal fusion      Referring Physician:  Kervin Mueller MD MD Orders:  PT Eval and Treat   Return MD Appt:    Date of Onset:    2024  Allergies:  Patient has no known allergies.  Restrictions/Precautions:    Cervical collar      Medications Last Reviewed:  2024     SUBJECTIVE   History of Injury/Illness (Reason for Referral):  Pt states she had a C4-7 fusion on 24. Pt states she started having weakness in her in B Les, has no feeling on the L side from midway down her torso down to her feet, right leg jumps. Legs were giving out, primarily the R. Had a C4-5 fusion in  for a bulging disk. Was having a lot of difficulty walking due to weakness and imbalance.   Patient Stated Goal(s):  \"Get some strength back\"  Initial Pain Level:       /10   Post Session Pain Level:      /10  Past Medical History/Comorbidities:   Ms. 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

## 2024-04-30 ENCOUNTER — HOSPITAL ENCOUNTER (OUTPATIENT)
Dept: PHYSICAL THERAPY | Age: 54
Setting detail: RECURRING SERIES
Discharge: HOME | End: 2024-05-03
Payer: COMMERCIAL

## 2024-04-30 ENCOUNTER — TELEPHONE (OUTPATIENT)
Dept: NEUROSURGERY | Age: 54
End: 2024-04-30

## 2024-04-30 DIAGNOSIS — G95.9 CERVICAL MYELOPATHY (HCC): Primary | ICD-10-CM

## 2024-04-30 DIAGNOSIS — G95.89 MYELOMALACIA (HCC): ICD-10-CM

## 2024-04-30 DIAGNOSIS — Z98.890 HISTORY OF CERVICAL DISCECTOMY: ICD-10-CM

## 2024-04-30 DIAGNOSIS — R26.1 SPASTIC GAIT: ICD-10-CM

## 2024-04-30 DIAGNOSIS — M48.02 CERVICAL SPINAL STENOSIS: ICD-10-CM

## 2024-04-30 DIAGNOSIS — Z98.1 HISTORY OF FUSION OF CERVICAL SPINE: ICD-10-CM

## 2024-04-30 PROCEDURE — 97110 THERAPEUTIC EXERCISES: CPT

## 2024-04-30 NOTE — TELEPHONE ENCOUNTER
----- Message from Kervin Mueller MD sent at 4/30/2024  1:30 PM EDT -----  Regarding: FW: Travel time - lives in [place] and would like to not drive 1 hour to therapy  Radha,     Not sure what we need to do with this? Thanks    Kervin   ----- Message -----  From: Catrina Marks, PT  Sent: 4/30/2024   8:11 AM EDT  To: Kervin Mueller MD  Subject: Travel time - lives in [place] and would#    Hi there!  Tiffanie Phillips would like to transfer to a clinic that's a little closer to her home.  She has to get on  85 and it takes her an hour to get to clinic here.  She would prefer [facility] if we can get referral there, please.  (10 min from her house).     Thank you and appreciate your help

## 2024-04-30 NOTE — PROGRESS NOTES
Tiffanie Phillips  : 1970  Primary: [employer])  Secondary:  [facility]  [address] [place] [address]  Phone: [phone]  Fax: [phone] Plan Frequency: 2x/week for 90 days    Plan of Care/Certification Expiration Date: 24        Plan of Care/Certification Expiration Date:  Plan of Care/Certification Expiration Date: 24    Frequency/Duration:   Plan Frequency: 2x/week for 90 days      Time In/Out:   Time In: 0800  Time Out: 0840      PT Visit Info:         Visit Count:  2    OUTPATIENT PHYSICAL THERAPY:   Treatment Note 2024       Episode  (s/p Cervical Fusion)               Treatment Diagnosis:     No data found  Medical/Referring Diagnosis:    Cervical spinal stenosis  S/P cervical spinal fusion      Referring Physician:  Kervin Mueller MD MD Orders:  PT Eval and Treat   Return MD Appt:     Date of Onset:  24  Allergies:   Patient has no known allergies.  Restrictions/Precautions:   Post Surgical Precautions: stay in C-collar at all times in therapy initially  Cervical collar, focus on LE strengthening/balance/gait      Interventions Planned (Treatment may consist of any combination of the following):     See Assessment Note    Subjective Comments:   Tiffanie Phillips spent 1 hour in traffic this morning and understandably would like to stay in [place] for therapy.   Initial Pain Level::      0/10  Post Session Pain Level:        /10  Medications Last Reviewed:  2024  Updated Objective Findings:  See Evaluation Note from today  Treatment   THERAPEUTIC EXERCISE: (40 minutes):    Exercises per grid below to improve mobility, strength, and coordination.  Required minimal visual, verbal, manual, and tactile cues to promote proper body posture and promote proper body mechanics.  Progressed resistance, range, repetitions, and complexity of movement as indicated.     Date:   Date:  24

## 2024-05-01 ENCOUNTER — NURSE ONLY (OUTPATIENT)
Dept: NEUROSURGERY | Age: 54
End: 2024-05-01

## 2024-05-01 VITALS
SYSTOLIC BLOOD PRESSURE: 143 MMHG | WEIGHT: 168 LBS | HEART RATE: 84 BPM | OXYGEN SATURATION: 100 % | TEMPERATURE: 97.1 F | BODY MASS INDEX: 27 KG/M2 | DIASTOLIC BLOOD PRESSURE: 89 MMHG | HEIGHT: 66 IN

## 2024-05-01 DIAGNOSIS — G95.89 MYELOMALACIA (HCC): ICD-10-CM

## 2024-05-01 DIAGNOSIS — G95.9 CERVICAL MYELOPATHY (HCC): Primary | ICD-10-CM

## 2024-05-01 DIAGNOSIS — M48.02 CERVICAL SPINAL STENOSIS: ICD-10-CM

## 2024-05-02 NOTE — PROGRESS NOTES
Subjective: Patient states her right foot weakness is improved but persists with right shoulder and neck pain. No changes in her balance. She states during hospitalization she did have difficulties with the staff and her post op care. During transport to her floor bed she was dropped onto the bed with no collar in place    Consent: verbal consent obtained and given by the patient. Risks of removal include bleeding,infection,reopening and excessive scarring.  Objective:  BP (!) 143/89 (Site: Left Upper Arm)   Pulse 84   Temp 97.1 °F (36.2 °C) (Temporal)   Ht 1.676 m (5' 6\")   Wt 76.2 kg (168 lb)   SpO2 100%   BMI 27.12 kg/m²   Pain Scale: 0 - No pain/10    General: patient is cooperative; mood and affect are appropriate; denies any fevers,chills or headache; denies any fatigue or weakness  HEENT:trachea is midline; no voice, visual or swallowing difficulty; neck ROM of motion not checked due to cervical collar; mucous membranes are normal  Neuro:alert and oriented; denies any dizziness; gait and coordination observed are tentative stepping up; ambulates without assistance but hold on to her  while walking  Other findings: daily bone growth stimulator  Wound: Posterior cervical dressings removed for a healing incisional line with Exofen mesh and surgical glue intact  Number of sutures/staples removed: none ,mesh intact    Assessment:  Post op check: Patient is here for initial post-op check. ALYSSA Garcia   is the supervising provider in clinic today and approves of today's treatment plan      Plan:  Post-op instructions: strict 2-5 pound weight limit; frequent positional changes; no bending ,lifting or twisting;instructed on proper body mechanics; instructed on no driving; aspen collar on at all times when above 30 degrees  Incisional care: not to saturate and pat dry;do not itch scratch or submerge; daily betadine swab given to patient to cleanse incision  Management and expected pain:advised patient of

## 2024-05-09 ENCOUNTER — PATIENT MESSAGE (OUTPATIENT)
Dept: NEUROSURGERY | Age: 54
End: 2024-05-09

## 2024-05-09 NOTE — TELEPHONE ENCOUNTER
From: Tiffanie Phillips  To: Dr. Kervin Mueller  Sent: 5/9/2024 7:31 AM EDT  Subject: Neck    I'm getting ready for therapy and I was reaching up to put hair up and I felt the most horrible pain. [place] like something split. Do you think it's that bandage breaking loose? My mother didn't see any blood. I just don't want to mess anything up

## 2024-05-13 ENCOUNTER — OFFICE VISIT (OUTPATIENT)
Dept: OBGYN CLINIC | Age: 54
End: 2024-05-13
Payer: COMMERCIAL

## 2024-05-13 VITALS — DIASTOLIC BLOOD PRESSURE: 78 MMHG | SYSTOLIC BLOOD PRESSURE: 140 MMHG | BODY MASS INDEX: 27.44 KG/M2 | WEIGHT: 170 LBS

## 2024-05-13 DIAGNOSIS — N95.1 VASOMOTOR SYMPTOMS DUE TO MENOPAUSE: ICD-10-CM

## 2024-05-13 DIAGNOSIS — D21.9 FIBROIDS: ICD-10-CM

## 2024-05-13 DIAGNOSIS — N80.9 ENDOMETRIOSIS: ICD-10-CM

## 2024-05-13 DIAGNOSIS — N92.1 MENOMETRORRHAGIA: Primary | ICD-10-CM

## 2024-05-13 DIAGNOSIS — Z71.89 COUNSELING FOR HORMONE REPLACEMENT THERAPY: ICD-10-CM

## 2024-05-13 PROCEDURE — 99214 OFFICE O/P EST MOD 30 MIN: CPT | Performed by: OBSTETRICS & GYNECOLOGY

## 2024-05-13 NOTE — PROGRESS NOTES
CC:  FU HMB/IMB       HPI:  54 y.o.  G0 presents today for FU for hx HMB/IMB-L, known endometriosis.   No LMP recorded. (Menstrual status: Chemically Induced).  See last note from 8/24/23 for complete details.     PCP: Dr Akash Degroot      Also followed by Ortho and neuro for spastic cervical myelopathy, now s/p anterior cervical discectomy and fusion surgery w/ significant difficulty with ambulation.  Loss of feeling in pelvis secondary to this.          Contraception:  previously Micronor OCPs  (Dc'd at [facility] on 7/25/23 and instructed to start Aygestin 5mg BID w/ backup condoms)    hx endometriosis (dx via laparoscopy x2) and hx HMB/IMB.  Hx uterine Fibroids   No Interest in hysterectomy in the past      Has been doing well in the past w/ OCPs w/ only occasional BTB        However, she reported at her AE 7/2023 that in the last 3 months started having recurrence of HMB/IMB changing tampon every 1 hour.    Secondary to the spastic cervical myelopathy she states she doesn't really feel \"anything\" or pain in her pelvis.  No VB     No vb w/ sex  States has to wear depends due to not being able to tell when bladder is full, etc  S/p pelvic floor PT  Seeing Urogyn, too        BMI 25         Micronor OCPs DC'd 7/2023 -> Rx Aygestin 5mg BID w/ backup condoms.    Pt reports compliance with this and has not had any VB since starting the Aygestin.          Labs:  CBC wnl -- hgb  14.3 (7/25/2023)  TFTs wnl    EMB  (7/19/21) wnl---performed secondary to Neg Cotesting but + endometrial cells           Imaging:   TVUS 6/20/19 :  Uterus 83mL  EMS 3.64mm  At least 3 fibroids, all intramural  Largest 1.4cm, just posterior to endometrial lining   ovs wnl other than tiny calcifications noted on the ovarian stroma  Prominent bowel just next to the ovary          Repeat TVUS 8/25/23:  Uterus 85mL  EMS 2.85mm  UT IS HETEROGENOUS AND APPEARS ARCUATE.  THE LINING MEASURES APPROX. 2.5 - 3.2MM.  RT OV-WNL.  LT OV- SMALL, SIMPLE CYST.

## 2024-05-22 ENCOUNTER — HOSPITAL ENCOUNTER (OUTPATIENT)
Dept: GENERAL RADIOLOGY | Age: 54
Discharge: HOME | End: 2024-05-25
Payer: COMMERCIAL

## 2024-05-22 ENCOUNTER — OFFICE VISIT (OUTPATIENT)
Dept: NEUROSURGERY | Age: 54
End: 2024-05-22

## 2024-05-22 VITALS
OXYGEN SATURATION: 100 % | HEIGHT: 66 IN | DIASTOLIC BLOOD PRESSURE: 86 MMHG | SYSTOLIC BLOOD PRESSURE: 143 MMHG | HEART RATE: 78 BPM | WEIGHT: 168.38 LBS | BODY MASS INDEX: 27.06 KG/M2 | TEMPERATURE: 97.2 F

## 2024-05-22 DIAGNOSIS — Z98.1 S/P CERVICAL SPINAL FUSION: ICD-10-CM

## 2024-05-22 DIAGNOSIS — Z98.890 STATUS POST LAMINECTOMY: ICD-10-CM

## 2024-05-22 DIAGNOSIS — G95.9 CERVICAL MYELOPATHY (HCC): ICD-10-CM

## 2024-05-22 DIAGNOSIS — G95.9 CERVICAL MYELOPATHY (HCC): Primary | ICD-10-CM

## 2024-05-22 DIAGNOSIS — M48.02 CERVICAL SPINAL STENOSIS: ICD-10-CM

## 2024-05-22 DIAGNOSIS — G95.89 MYELOMALACIA (HCC): ICD-10-CM

## 2024-05-22 PROCEDURE — 72040 X-RAY EXAM NECK SPINE 2-3 VW: CPT

## 2024-05-22 PROCEDURE — 99024 POSTOP FOLLOW-UP VISIT: CPT | Performed by: NEUROLOGICAL SURGERY

## 2024-05-22 NOTE — PROGRESS NOTES
dry  Awake, alert, and oriented   Speech Fluent  Eyes open spontaneously   Face symmetric   No mid-line cervical, thoracic, or lumbar tenderness to palpation   Patient with strength exam as follows:   Upper Extremities: Right Left      Deltoid  5 5    Biceps  5 5    Triceps  5 5      5 5     Lower Extremities:      Hip Flex 5 5    Quads  5 5    Hamstrings 5 5    Dorsiflex 5 5    Plantarflex 5 5    EHL  5 5  Well-healed midline posterior cervical incision after the LiquiBand secure skin mesh was removed  Gait: normal nonantalgic gait, stands from a seated position without difficulty and ambulates independently    Assessment & Plan:  Tiffanie Phillips is a 54 y.o. female who presents today postoperative follow-up.  The patient most recently underwent a C4-C7 posterior cervical laminectomies for decompression with instrumented fixation and bilateral C4-C7 arthrodesis performed on 4/16/2024. I have independently reviewed and interpreted the patient's x-ray imaging AP and lateral views of the cervical spine performed on 5/22/2023 that demonstrates a posterior cervical laminectomy with instrumented fixation as well as a prior C5-C6 anterior cervical discectomy and fusion construct.  This into a stable with proper positioning of the anterior posterior cervical construct and anterior cervical construct.  At this point the patient is doing very well in the postoperative setting she may lift up to 10 pounds.  Recommend continued use of the external orthosis i.e. cervical collar when up and ambulatory for another 2 months.  Will see her back in 2 months time for her 3-month postoperative follow-up with repeat AP and lateral x-ray imaging of the cervical spine to be obtained before her visit.      ICD-10-CM    1. Cervical myelopathy (HCC)  G95.9       2. Status post laminectomy  Z98.890       3. S/P cervical spinal fusion  Z98.1         Kervin Mueller MD, FAANS  Neurosurgeon  [facility]  [place]

## 2024-05-30 ENCOUNTER — PATIENT MESSAGE (OUTPATIENT)
Dept: NEUROSURGERY | Age: 54
End: 2024-05-30

## 2024-05-30 NOTE — TELEPHONE ENCOUNTER
From: Tiffanie Phillips  To: Dr. Kervin Mueller  Sent: 5/30/2024 2:58 PM EDT  Subject: Pain in neck    Hate to bother you but is it normal to have neck pain after therapy?

## 2024-06-02 ENCOUNTER — PATIENT MESSAGE (OUTPATIENT)
Dept: NEUROSURGERY | Age: 54
End: 2024-06-02

## 2024-06-02 DIAGNOSIS — Z98.890 STATUS POST LAMINECTOMY: Primary | ICD-10-CM

## 2024-06-02 DIAGNOSIS — Z98.1 S/P CERVICAL SPINAL FUSION: ICD-10-CM

## 2024-06-02 DIAGNOSIS — G95.9 CERVICAL MYELOPATHY (HCC): ICD-10-CM

## 2024-06-03 RX ORDER — CYCLOBENZAPRINE HCL 10 MG
10 TABLET ORAL 3 TIMES DAILY PRN
Qty: 30 TABLET | Refills: 1 | Status: SHIPPED | OUTPATIENT
Start: 2024-06-03 | End: 2024-06-13

## 2024-06-03 NOTE — TELEPHONE ENCOUNTER
From: Tiffanie Phillips  To: Dr. Kervin Mueller  Sent: 6/2/2024 4:23 PM EDT  Subject: Neck pain    My neck still hurts pretty bad on right side am I able to get refill on muscle relaxers?

## 2024-06-17 ENCOUNTER — TELEPHONE (OUTPATIENT)
Dept: NEUROSURGERY | Age: 54
End: 2024-06-17

## 2024-06-19 ENCOUNTER — OFFICE VISIT (OUTPATIENT)
Dept: UROLOGY | Age: 54
End: 2024-06-19
Payer: COMMERCIAL

## 2024-06-19 DIAGNOSIS — N32.81 OAB (OVERACTIVE BLADDER): ICD-10-CM

## 2024-06-19 DIAGNOSIS — N39.0 RECURRENT UTI: ICD-10-CM

## 2024-06-19 DIAGNOSIS — N39.0 RECURRENT UTI: Primary | ICD-10-CM

## 2024-06-19 PROCEDURE — 99214 OFFICE O/P EST MOD 30 MIN: CPT | Performed by: NURSE PRACTITIONER

## 2024-06-19 PROCEDURE — 81003 URINALYSIS AUTO W/O SCOPE: CPT | Performed by: NURSE PRACTITIONER

## 2024-06-19 ASSESSMENT — ENCOUNTER SYMPTOMS
BACK PAIN: 0
NAUSEA: 0

## 2024-06-19 NOTE — PROGRESS NOTES
05/11/23   AMB POC URINALYSIS DIP STICK AUTO W/O MICRO   Result Value Ref Range    Color, Urine, POC Yellow     Clarity, Urine, POC Clear     Glucose, Urine, POC Negative Negative    Bilirubin, Urine, POC Negative Negative    Ketones, Urine, POC Negative Negative    Specific Gravity, Urine, POC 1.025 1.001 - 1.035    Blood, Urine, POC Negative Negative    pH, Urine, POC 5.5 4.6 - 8.0    Protein, Urine, POC Negative Negative    Urobilinogen, POC Normal     Nitrite, Urine, POC Positive Negative    Leukocyte Esterase, Urine, POC Trace Negative       UA - Micro  WBC - 0  RBC - 0  Bacteria - 0  Epith - 0    PHYSICAL EXAM    General appearance - well appearing and in no distress  Mental status - alert, oriented to person, place, and time  Neck - supple, no significant adenopathy  Chest/Lung-  Quiet, even and easy respiratory effort without use of accessory muscles  Skin - normal coloration and turgor, no rashes      Assessment and Plan    ICD-10-CM    1. Recurrent UTI  N39.0 AMB POC URINALYSIS DIP STICK AUTO W/O MICRO     Culture, Urine      2. OAB (overactive bladder)  N32.81           Recurrent UTI- urine w possible infection. Culture pending. Use Macrobid 100 mg PO BID x 7 days IF sx start. Otherwise wait for cultures results.      Advised to call this office if develops sx of UTI for specimen check. If unable to come to this office, encouraged to have urine culture performed and records sent to this office.     UUI/OAB- Cont Gemtesa 75 mg PO daily.      RTO in 6 months for follow up. Advised to call sooner if sx worsen.        Clementine Price, APRN - CNP  Dr. Eason is supervising physician today and he approves plan of care.

## 2024-06-21 LAB
BACTERIA SPEC CULT: ABNORMAL
BACTERIA SPEC CULT: ABNORMAL
SERVICE CMNT-IMP: ABNORMAL

## 2024-06-21 RX ORDER — SULFAMETHOXAZOLE AND TRIMETHOPRIM 800; 160 MG/1; MG/1
1 TABLET ORAL 2 TIMES DAILY
Qty: 14 TABLET | Refills: 0 | Status: SHIPPED | OUTPATIENT
Start: 2024-06-21 | End: 2024-06-28

## 2024-06-26 ENCOUNTER — PATIENT MESSAGE (OUTPATIENT)
Dept: NEUROSURGERY | Age: 54
End: 2024-06-26

## 2024-06-26 NOTE — TELEPHONE ENCOUNTER
From: Tiffanie Phillips  To: Dr. Kervin Mueller  Sent: 6/26/2024 10:40 AM EDT  Subject: Neck    I hate to bother you but is it normal for a big low spot in neck?

## 2024-07-01 ENCOUNTER — PATIENT MESSAGE (OUTPATIENT)
Dept: NEUROSURGERY | Age: 54
End: 2024-07-01

## 2024-07-01 NOTE — TELEPHONE ENCOUNTER
From: Tiffanie Phillips  To: Dr. Kervin Mueller  Sent: 7/1/2024 8:19 AM EDT  Subject: Vertigo    I had to cancel this week's therapy due to vertigo. Any suggestions to help?

## 2024-07-10 ENCOUNTER — HOSPITAL ENCOUNTER (OUTPATIENT)
Dept: GENERAL RADIOLOGY | Age: 54
Discharge: HOME | End: 2024-07-13
Payer: COMMERCIAL

## 2024-07-10 ENCOUNTER — OFFICE VISIT (OUTPATIENT)
Dept: NEUROSURGERY | Age: 54
End: 2024-07-10

## 2024-07-10 VITALS
DIASTOLIC BLOOD PRESSURE: 88 MMHG | HEART RATE: 69 BPM | SYSTOLIC BLOOD PRESSURE: 166 MMHG | BODY MASS INDEX: 27.32 KG/M2 | HEIGHT: 66 IN | WEIGHT: 170 LBS | OXYGEN SATURATION: 100 % | TEMPERATURE: 97.5 F

## 2024-07-10 DIAGNOSIS — Z98.1 S/P CERVICAL SPINAL FUSION: Primary | ICD-10-CM

## 2024-07-10 DIAGNOSIS — G95.9 CERVICAL MYELOPATHY (HCC): ICD-10-CM

## 2024-07-10 DIAGNOSIS — Z98.890 STATUS POST LAMINECTOMY: ICD-10-CM

## 2024-07-10 DIAGNOSIS — Z98.1 S/P CERVICAL SPINAL FUSION: ICD-10-CM

## 2024-07-10 PROCEDURE — 72040 X-RAY EXAM NECK SPINE 2-3 VW: CPT

## 2024-07-10 PROCEDURE — 99024 POSTOP FOLLOW-UP VISIT: CPT | Performed by: NURSE PRACTITIONER

## 2024-07-10 RX ORDER — MECLIZINE HYDROCHLORIDE 25 MG/1
TABLET ORAL
COMMUNITY
Start: 2024-06-30

## 2024-07-10 RX ORDER — CYCLOBENZAPRINE HCL 10 MG
10 TABLET ORAL NIGHTLY PRN
Qty: 30 TABLET | Refills: 0 | Status: SHIPPED | OUTPATIENT
Start: 2024-07-10 | End: 2024-07-20

## 2024-07-10 NOTE — PROGRESS NOTES
[facility] CL[facility]IC NOTE:   History of Present Illness:    CC: 3-month postoperative follow-up appointment    Tiffanie Phillips is a 54 y.o. here to follow-up approximately 3 months out from a C4-7 posterior cervical fusion on 4-.  Patient states that overall she feels like she is doing really well until she started having an episode of vertigo on Sunday, June 30.  Patient states that since that time she has noticed some right leg jumping.  Patient notes this jumping is mostly noticeable after she has worked really hard with physical therapy.  Patient states that if she sitting or walking for long period she does feel a burning sensation in the back of her neck.  Patient currently takes gabapentin 300 mg 3 times daily.  Patient is encouraged to increase her gabapentin dose to 600 mg at bedtime to see if this does not help with some of the jumping sensation.  Patient is cleared to begin to wean out of her cervical collar over the next 2 to 3 weeks.  Patient is also cleared to lift up to 30 pounds at this time.  The new cervical x-rays reveal stable C4-7 posterior cervical fusion.    Past Medical History:   Diagnosis Date    Adverse effect of anesthesia     BP dropped during a colonoscopy x 1 LT[facility], [facility] within 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 performed by Kervin Mueller MD at [facility]

## 2024-07-16 ENCOUNTER — PATIENT MESSAGE (OUTPATIENT)
Dept: NEUROSURGERY | Age: 54
End: 2024-07-16

## 2024-07-16 NOTE — TELEPHONE ENCOUNTER
From: Tiffanie Phillips  To: Dr. Kervin Mueller  Sent: 7/16/2024 4:09 PM EDT  Subject: Weakness in legs    Good afternoon, I have concerns on my legs being real weak ever since the vertigo. They tingle and just don't want to move. I can't even go grocery shopping because they won't hold me. Any suggestions?

## 2024-07-17 ENCOUNTER — OFFICE VISIT (OUTPATIENT)
Dept: NEUROLOGY | Age: 54
End: 2024-07-17
Payer: COMMERCIAL

## 2024-07-17 VITALS
BODY MASS INDEX: 30.67 KG/M2 | HEART RATE: 83 BPM | OXYGEN SATURATION: 98 % | SYSTOLIC BLOOD PRESSURE: 139 MMHG | WEIGHT: 190 LBS | DIASTOLIC BLOOD PRESSURE: 80 MMHG

## 2024-07-17 DIAGNOSIS — R25.2 SPASTICITY: Primary | ICD-10-CM

## 2024-07-17 DIAGNOSIS — G57.93 NEUROPATHIC PAIN OF BOTH FEET: ICD-10-CM

## 2024-07-17 DIAGNOSIS — S24.143A: ICD-10-CM

## 2024-07-17 DIAGNOSIS — M48.061 NEUROFORAMINAL STENOSIS OF LUMBAR SPINE: ICD-10-CM

## 2024-07-17 DIAGNOSIS — M48.02 CERVICAL SPINAL STENOSIS: ICD-10-CM

## 2024-07-17 DIAGNOSIS — R20.0 NUMBNESS IN LEFT LEG: ICD-10-CM

## 2024-07-17 PROCEDURE — 99215 OFFICE O/P EST HI 40 MIN: CPT | Performed by: PSYCHIATRY & NEUROLOGY

## 2024-07-17 RX ORDER — GABAPENTIN 300 MG/1
CAPSULE ORAL
Qty: 360 CAPSULE | Refills: 2 | Status: SHIPPED | OUTPATIENT
Start: 2024-07-17 | End: 2024-12-27

## 2024-07-17 ASSESSMENT — ENCOUNTER SYMPTOMS
SHORTNESS OF BREATH: 0
COUGH: 0
ABDOMINAL PAIN: 0
BACK PAIN: 1
SORE THROAT: 0
TROUBLE SWALLOWING: 0
EYE PAIN: 0

## 2024-07-17 NOTE — PROGRESS NOTES
visit:    Spasticity    Numbness in left leg  -     gabapentin (NEURONTIN) 300 MG capsule; Take 300mg in AM, then 600mg in afternoon, then 600mg in PM.    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  I agree with continued follow-ups with neurosurgery and also I am glad that she is following with a neurologist at [facility].    I support establishing a pain management plan.       Return if symptoms worsen or fail to improve.    Hi Tyler MD  Epilepsy & Consultation Neurology  [address], [facility] [address]  [place] [address]  Phone: [phone]    All medications and relevant precautions were fully reviewed with the patient. More than 50% of this visit was used to evaluate, educate and coordinate care for the patient for the above concerns. Total visit time: 42 minutes. Time includes pre- and post- face-to-face time, including record review of available pertinent images and reports. I have written all aspects of this note, parts of which were produced using speech recognition software, which may contain inadvertent errors in the produced words.

## 2024-07-23 ENCOUNTER — TRANSCRIBE ORDERS (OUTPATIENT)
Dept: SCHEDULING | Age: 54
End: 2024-07-23

## 2024-07-23 DIAGNOSIS — Z12.31 ENCOUNTER FOR SCREENING MAMMOGRAM FOR MALIGNANT NEOPLASM OF BREAST: Primary | ICD-10-CM

## 2024-07-28 SDOH — ECONOMIC STABILITY: INCOME INSECURITY: HOW HARD IS IT FOR YOU TO PAY FOR THE VERY BASICS LIKE FOOD, HOUSING, MEDICAL CARE, AND HEATING?: NOT HARD AT ALL

## 2024-07-28 SDOH — ECONOMIC STABILITY: FOOD INSECURITY: WITHIN THE PAST 12 MONTHS, YOU WORRIED THAT YOUR FOOD WOULD RUN OUT BEFORE YOU GOT MONEY TO BUY MORE.: NEVER TRUE

## 2024-07-28 SDOH — ECONOMIC STABILITY: FOOD INSECURITY: WITHIN THE PAST 12 MONTHS, THE FOOD YOU BOUGHT JUST DIDN'T LAST AND YOU DIDN'T HAVE MONEY TO GET MORE.: NEVER TRUE

## 2024-07-29 ENCOUNTER — PATIENT MESSAGE (OUTPATIENT)
Dept: NEUROSURGERY | Age: 54
End: 2024-07-29

## 2024-07-29 DIAGNOSIS — M48.02 CERVICAL SPINAL STENOSIS: ICD-10-CM

## 2024-07-29 DIAGNOSIS — R29.898 WEAKNESS OF BOTH LEGS: ICD-10-CM

## 2024-07-29 DIAGNOSIS — R25.2 SPASTICITY: ICD-10-CM

## 2024-07-29 DIAGNOSIS — R20.0 NUMBNESS IN LEFT LEG: ICD-10-CM

## 2024-07-29 DIAGNOSIS — M48.061 NEUROFORAMINAL STENOSIS OF LUMBAR SPINE: ICD-10-CM

## 2024-07-29 DIAGNOSIS — Z98.1 S/P CERVICAL SPINAL FUSION: Primary | ICD-10-CM

## 2024-07-29 DIAGNOSIS — Z98.1 HISTORY OF FUSION OF CERVICAL SPINE: ICD-10-CM

## 2024-07-29 DIAGNOSIS — Z98.890 HISTORY OF CERVICAL DISCECTOMY: ICD-10-CM

## 2024-07-30 ENCOUNTER — OFFICE VISIT (OUTPATIENT)
Dept: OBGYN CLINIC | Age: 54
End: 2024-07-30
Payer: COMMERCIAL

## 2024-07-30 VITALS
WEIGHT: 174 LBS | SYSTOLIC BLOOD PRESSURE: 160 MMHG | BODY MASS INDEX: 27.97 KG/M2 | HEIGHT: 66 IN | DIASTOLIC BLOOD PRESSURE: 90 MMHG

## 2024-07-30 DIAGNOSIS — D21.9 FIBROIDS: ICD-10-CM

## 2024-07-30 DIAGNOSIS — Z01.419 WELL WOMAN EXAM WITH ROUTINE GYNECOLOGICAL EXAM: Primary | ICD-10-CM

## 2024-07-30 DIAGNOSIS — Z87.42 HISTORY OF MENORRHAGIA: ICD-10-CM

## 2024-07-30 DIAGNOSIS — R03.0 ELEVATED BLOOD PRESSURE READING: ICD-10-CM

## 2024-07-30 DIAGNOSIS — N80.9 ENDOMETRIOSIS: ICD-10-CM

## 2024-07-30 DIAGNOSIS — Z12.4 CERVICAL CANCER SCREENING: ICD-10-CM

## 2024-07-30 DIAGNOSIS — I10 CHRONIC HYPERTENSION: ICD-10-CM

## 2024-07-30 DIAGNOSIS — Z11.51 SCREENING FOR HUMAN PAPILLOMAVIRUS (HPV): ICD-10-CM

## 2024-07-30 DIAGNOSIS — Z87.440 HISTORY OF RECURRENT UTIS: ICD-10-CM

## 2024-07-30 DIAGNOSIS — Z12.31 ENCOUNTER FOR SCREENING MAMMOGRAM FOR MALIGNANT NEOPLASM OF BREAST: ICD-10-CM

## 2024-07-30 PROCEDURE — 3077F SYST BP >= 140 MM HG: CPT | Performed by: OBSTETRICS & GYNECOLOGY

## 2024-07-30 PROCEDURE — 99396 PREV VISIT EST AGE 40-64: CPT | Performed by: OBSTETRICS & GYNECOLOGY

## 2024-07-30 PROCEDURE — 99459 PELVIC EXAMINATION: CPT | Performed by: OBSTETRICS & GYNECOLOGY

## 2024-07-30 PROCEDURE — 3080F DIAST BP >= 90 MM HG: CPT | Performed by: OBSTETRICS & GYNECOLOGY

## 2024-07-30 RX ORDER — ESTRADIOL 0.1 MG/G
CREAM VAGINAL
Qty: 42.5 G | Refills: 4 | Status: SHIPPED | OUTPATIENT
Start: 2024-07-30

## 2024-07-30 ASSESSMENT — PATIENT HEALTH QUESTIONNAIRE - PHQ9
3. TROUBLE FALLING OR STAYING ASLEEP: NEARLY EVERY DAY
SUM OF ALL RESPONSES TO PHQ QUESTIONS 1-9: 15
SUM OF ALL RESPONSES TO PHQ QUESTIONS 1-9: 15
10. IF YOU CHECKED OFF ANY PROBLEMS, HOW DIFFICULT HAVE THESE PROBLEMS MADE IT FOR YOU TO DO YOUR WORK, TAKE CARE OF THINGS AT HOME, OR GET ALONG WITH OTHER PEOPLE: SOMEWHAT DIFFICULT
9. THOUGHTS THAT YOU WOULD BE BETTER OFF DEAD, OR OF HURTING YOURSELF: NOT AT ALL
7. TROUBLE CONCENTRATING ON THINGS, SUCH AS READING THE NEWSPAPER OR WATCHING TELEVISION: NOT AT ALL
SUM OF ALL RESPONSES TO PHQ QUESTIONS 1-9: 15
4. FEELING TIRED OR HAVING LITTLE ENERGY: NEARLY EVERY DAY
SUM OF ALL RESPONSES TO PHQ QUESTIONS 1-9: 15
8. MOVING OR SPEAKING SO SLOWLY THAT OTHER PEOPLE COULD HAVE NOTICED. OR THE OPPOSITE, BEING SO FIGETY OR RESTLESS THAT YOU HAVE BEEN MOVING AROUND A LOT MORE THAN USUAL: NOT AT ALL
2. FEELING DOWN, DEPRESSED OR HOPELESS: MORE THAN HALF THE DAYS
1. LITTLE INTEREST OR PLEASURE IN DOING THINGS: MORE THAN HALF THE DAYS
SUM OF ALL RESPONSES TO PHQ9 QUESTIONS 1 & 2: 4
5. POOR APPETITE OR OVEREATING: MORE THAN HALF THE DAYS
6. FEELING BAD ABOUT YOURSELF - OR THAT YOU ARE A FAILURE OR HAVE LET YOURSELF OR YOUR FAMILY DOWN: NEARLY EVERY DAY

## 2024-07-30 NOTE — PROGRESS NOTES
CC:  Annual     HPI:  54 y.o.  G0  presents today for a routine gynecological examination. No LMP recorded (lmp unknown). (Menstrual status: Chemically Induced).   Pt w/ known hx HMB/IMB-L, known endometriosis--- see last note form 5/13/24 for complete details.         Also followed by Ortho and neuro for spastic cervical myelopathy, now s/p anterior cervical discectomy and fusion surgery w/ significant difficulty with ambulation.  Loss of feeling in pelvis secondary to this.          Contraception:  previously Micronor OCPs  (Dc'd at [facility] on 7/25/23 and instructed to start Aygestin 5mg BID w/ backup condoms) due to recurrence of HMB/IMB at that time.  Has been very well controlled w/ this  Was able to decrease to 5mg QD and has remained successfully amenorrheic w/ this regimen.  No SEs w/ the Aygestin other than some mild weight gain     No postcoital vb; no pain w/ sex (no feeling at all given above)       Hx Endometriosis (dx via laparoscopy x2) and hx HMB/IMB.  Hx uterine Fibroids   No Interest in hysterectomy    Secondary to the spastic cervical myelopathy she states she doesn't really feel \"anything\"; no pain in her pelvis.        States has to wear depends due to not being able to tell when bladder is full, etc  S/p pelvic floor PT  Seeing Urogyn, too              Prior Labs:  CBC wnl -- hgb  14.3 (7/25/2023)  TFTs wnl     EMB  (7/19/21) wnl---performed secondary to Neg Cotesting but + endometrial cells            Imaging:   Most recent TVUS 8/25/23:  Uterus 85mL  EMS 2.85mm  UT IS HETEROGENOUS AND APPEARS ARCUATE.  THE LINING MEASURES APPROX. 2.5 - 3.2MM.  RT OV-WNL.  LT OV- SMALL, SIMPLE CYST. <1.0CM.      Given very thin EMS -- no repeat EMB performed                Vasomotor sxs:  mild, infrequent--much IMPROVED since getting neck brace off  on 4/16/24 (See note from 5/13/24); no meds.       Vaginal dryness:  No; was placed on Estrace vaginal cream to assist w/ her hx of frequent UTIs.  Admits has not been

## 2024-07-31 ENCOUNTER — OFFICE VISIT (OUTPATIENT)
Dept: UROLOGY | Age: 54
End: 2024-07-31
Payer: COMMERCIAL

## 2024-07-31 DIAGNOSIS — N39.0 RECURRENT UTI: Primary | ICD-10-CM

## 2024-07-31 LAB
BILIRUBIN, URINE, POC: NEGATIVE
BLOOD URINE, POC: NORMAL
GLUCOSE URINE, POC: NEGATIVE
KETONES, URINE, POC: NEGATIVE
LEUKOCYTE ESTERASE, URINE, POC: NORMAL
NITRITE, URINE, POC: NEGATIVE
PH, URINE, POC: 6.5 (ref 4.6–8)
PROTEIN,URINE, POC: NEGATIVE
SPECIFIC GRAVITY, URINE, POC: 1.01 (ref 1–1.03)
URINALYSIS CLARITY, POC: NORMAL
URINALYSIS COLOR, POC: NORMAL
UROBILINOGEN, POC: NORMAL

## 2024-07-31 PROCEDURE — 81003 URINALYSIS AUTO W/O SCOPE: CPT | Performed by: NURSE PRACTITIONER

## 2024-07-31 NOTE — PROGRESS NOTES
UA - Dipstick  Results for orders placed or performed in visit on 07/31/24   AMB POC URINALYSIS DIP STICK AUTO W/O MICRO   Result Value Ref Range    Color (UA POC)      Clarity (UA POC)      Glucose, Urine, POC Negative     Bilirubin, Urine, POC Negative     KETONES, Urine, POC Negative     Specific Gravity, Urine, POC 1.015 1.001 - 1.035    Blood (UA POC) Trace-intact     pH, Urine, POC 6.5 4.6 - 8.0    Protein, Urine, POC Negative     Urobilinogen, POC 0.2 mg/dL     Nitrite, Urine, POC Negative     Leukocyte Esterase, Urine, POC Trace    Results for orders placed or performed in visit on 05/11/23   AMB POC URINALYSIS DIP STICK AUTO W/O MICRO   Result Value Ref Range    Color, Urine, POC Yellow     Clarity, Urine, POC Clear     Glucose, Urine, POC Negative Negative    Bilirubin, Urine, POC Negative Negative    Ketones, Urine, POC Negative Negative    Specific Gravity, Urine, POC 1.025 1.001 - 1.035    Blood, Urine, POC Negative Negative    pH, Urine, POC 5.5 4.6 - 8.0    Protein, Urine, POC Negative Negative    Urobilinogen, POC Normal     Nitrite, Urine, POC Positive Negative    Leukocyte Esterase, Urine, POC Trace Negative       UA - Micro  WBC - 0  RBC - 0  Bacteria - 0  Epith - 0      Requested Prescriptions      No prescriptions requested or ordered in this encounter     Plan    Diagnosis Orders   1. Recurrent UTI  AMB POC URINALYSIS DIP STICK AUTO W/O MICRO          Urine normal. Culture only.    Clementine Price, APRN - CNP

## 2024-08-01 DIAGNOSIS — N39.0 RECURRENT UTI: ICD-10-CM

## 2024-08-03 LAB
BACTERIA SPEC CULT: ABNORMAL
BACTERIA SPEC CULT: ABNORMAL
SERVICE CMNT-IMP: ABNORMAL

## 2024-08-05 LAB
COLLECTION METHOD: NORMAL
CYTOLOGIST CVX/VAG CYTO: NORMAL
CYTOLOGY CVX/VAG DOC THIN PREP: NORMAL
HPV APTIMA: NEGATIVE
HPV GENOTYPE REFLEX: NORMAL
Lab: NORMAL
Lab: NORMAL
OTHER PT INFO: NORMAL
PAP SOURCE: NORMAL
PATH REPORT.FINAL DX SPEC: NORMAL
PREV CYTO INFO: NORMAL
PREV TREATMENT RESULTS: NORMAL
PREV TREATMENT: NORMAL
STAT OF ADQ CVX/VAG CYTO-IMP: NORMAL

## 2024-08-05 RX ORDER — SULFAMETHOXAZOLE AND TRIMETHOPRIM 800; 160 MG/1; MG/1
1 TABLET ORAL 2 TIMES DAILY
Qty: 14 TABLET | Refills: 0 | Status: SHIPPED | OUTPATIENT
Start: 2024-08-05 | End: 2024-08-12

## 2024-08-06 ENCOUNTER — PATIENT MESSAGE (OUTPATIENT)
Dept: NEUROSURGERY | Age: 54
End: 2024-08-06

## 2024-08-06 NOTE — TELEPHONE ENCOUNTER
From: Tiffanie Phillips  To: Dr. Kervin Mueller  Sent: 8/6/2024 2:52 PM EDT  Subject: Ent    Can dr at ent test for vertigo?

## 2024-08-09 ENCOUNTER — HOSPITAL ENCOUNTER (OUTPATIENT)
Dept: MRI IMAGING | Age: 54
Discharge: HOME | End: 2024-08-09
Payer: COMMERCIAL

## 2024-08-09 ENCOUNTER — HOSPITAL ENCOUNTER (OUTPATIENT)
Dept: MRI IMAGING | Age: 54
End: 2024-08-09
Payer: COMMERCIAL

## 2024-08-09 DIAGNOSIS — R29.898 WEAKNESS OF BOTH LEGS: ICD-10-CM

## 2024-08-09 DIAGNOSIS — R20.0 NUMBNESS IN LEFT LEG: ICD-10-CM

## 2024-08-09 DIAGNOSIS — Z98.1 S/P CERVICAL SPINAL FUSION: ICD-10-CM

## 2024-08-09 DIAGNOSIS — M48.02 CERVICAL SPINAL STENOSIS: ICD-10-CM

## 2024-08-09 DIAGNOSIS — Z98.1 HISTORY OF FUSION OF CERVICAL SPINE: ICD-10-CM

## 2024-08-09 DIAGNOSIS — Z98.890 HISTORY OF CERVICAL DISCECTOMY: ICD-10-CM

## 2024-08-09 DIAGNOSIS — R25.2 SPASTICITY: ICD-10-CM

## 2024-08-09 DIAGNOSIS — M48.061 NEUROFORAMINAL STENOSIS OF LUMBAR SPINE: ICD-10-CM

## 2024-08-09 PROCEDURE — 72156 MRI NECK SPINE W/O & W/DYE: CPT

## 2024-08-09 PROCEDURE — 72148 MRI LUMBAR SPINE W/O DYE: CPT

## 2024-08-09 PROCEDURE — A9579 GAD-BASE MR CONTRAST NOS,1ML: HCPCS | Performed by: NURSE PRACTITIONER

## 2024-08-09 PROCEDURE — 6360000004 HC RX CONTRAST MEDICATION: Performed by: NURSE PRACTITIONER

## 2024-08-09 PROCEDURE — 2580000003 HC RX 258: Performed by: NURSE PRACTITIONER

## 2024-08-09 RX ORDER — SODIUM CHLORIDE 0.9 % (FLUSH) 0.9 %
10 SYRINGE (ML) INJECTION AS NEEDED
Status: DISCONTINUED | OUTPATIENT
Start: 2024-08-09 | End: 2024-08-13 | Stop reason: HOSPADM

## 2024-08-09 RX ADMIN — GADOTERIDOL 16 ML: 279.3 INJECTION, SOLUTION INTRAVENOUS at 20:40

## 2024-08-09 RX ADMIN — SODIUM CHLORIDE, PRESERVATIVE FREE 10 ML: 5 INJECTION INTRAVENOUS at 20:41

## 2024-08-14 ENCOUNTER — OFFICE VISIT (OUTPATIENT)
Dept: UROLOGY | Age: 54
End: 2024-08-14
Payer: COMMERCIAL

## 2024-08-14 DIAGNOSIS — N39.0 RECURRENT UTI: Primary | ICD-10-CM

## 2024-08-14 LAB
BILIRUBIN, URINE, POC: NEGATIVE
BLOOD URINE, POC: NORMAL
GLUCOSE URINE, POC: NEGATIVE
KETONES, URINE, POC: NEGATIVE
LEUKOCYTE ESTERASE, URINE, POC: NEGATIVE
NITRITE, URINE, POC: NEGATIVE
PH, URINE, POC: 5.5 (ref 4.6–8)
PROTEIN,URINE, POC: NEGATIVE
SPECIFIC GRAVITY, URINE, POC: 1.03 (ref 1–1.03)
URINALYSIS CLARITY, POC: NORMAL
URINALYSIS COLOR, POC: NORMAL
UROBILINOGEN, POC: NORMAL

## 2024-08-14 PROCEDURE — 81003 URINALYSIS AUTO W/O SCOPE: CPT | Performed by: NURSE PRACTITIONER

## 2024-08-14 NOTE — PROGRESS NOTES
Reason for collection: Frequency and left back pain  Patient #: [phone]  Pharmacy: [facility]        UA - Dipstick  Results for orders placed or performed in visit on 08/14/24   AMB POC URINALYSIS DIP STICK AUTO W/O MICRO   Result Value Ref Range    Color (UA POC)      Clarity (UA POC)      Glucose, Urine, POC Negative     Bilirubin, Urine, POC Negative     KETONES, Urine, POC Negative     Specific Gravity, Urine, POC 1.030 1.001 - 1.035    Blood (UA POC) Trace-lysed     pH, Urine, POC 5.5 4.6 - 8.0    Protein, Urine, POC Negative     Urobilinogen, POC 0.2 mg/dL     Nitrite, Urine, POC Negative     Leukocyte Esterase, Urine, POC Negative    Results for orders placed or performed in visit on 05/11/23   AMB POC URINALYSIS DIP STICK AUTO W/O MICRO   Result Value Ref Range    Color, Urine, POC Yellow     Clarity, Urine, POC Clear     Glucose, Urine, POC Negative Negative    Bilirubin, Urine, POC Negative Negative    Ketones, Urine, POC Negative Negative    Specific Gravity, Urine, POC 1.025 1.001 - 1.035    Blood, Urine, POC Negative Negative    pH, Urine, POC 5.5 4.6 - 8.0    Protein, Urine, POC Negative Negative    Urobilinogen, POC Normal     Nitrite, Urine, POC Positive Negative    Leukocyte Esterase, Urine, POC Trace Negative       UA - Micro  WBC - 0  RBC - 0  Bacteria - 0  Epith - 0      Requested Prescriptions      No prescriptions requested or ordered in this encounter     Plan    Diagnosis Orders   1. Recurrent UTI  AMB POC URINALYSIS DIP STICK AUTO W/O MICRO    Culture, Urine          Culture only.    Clementine Price, APRN - CNP

## 2024-08-16 LAB
BACTERIA SPEC CULT: NORMAL
BACTERIA SPEC CULT: NORMAL
SERVICE CMNT-IMP: NORMAL

## 2024-08-20 ENCOUNTER — HOSPITAL ENCOUNTER (OUTPATIENT)
Dept: MAMMOGRAPHY | Age: 54
Discharge: HOME | End: 2024-08-23
Attending: OBSTETRICS & GYNECOLOGY
Payer: COMMERCIAL

## 2024-08-20 VITALS — HEIGHT: 66 IN | WEIGHT: 170 LBS | BODY MASS INDEX: 27.32 KG/M2

## 2024-08-20 DIAGNOSIS — Z12.31 ENCOUNTER FOR SCREENING MAMMOGRAM FOR MALIGNANT NEOPLASM OF BREAST: ICD-10-CM

## 2024-08-20 PROCEDURE — 77067 SCR MAMMO BI INCL CAD: CPT

## 2024-08-22 NOTE — RESULT ENCOUNTER NOTE
BREAST DENSITY: The breasts are heterogeneously dense, which may obscure small  masses. (#BDC)    FINDINGS: Developing asymmetry in the left breast middle depth medially.   No  added significant suspicious looking masses, calcifications or areas of  architectural distortion.      IMPRESSION:  Left breast developing asymmetry for which diagnostic left  mammograms including tomosynthesis and possibly breast ultrasound recommended.    BI-RADS Assessment Category 0: Incomplete: Needs additional imaging evaluation.   We will attempt to contact the patient and arrange this additional imaging.  (#BRad0)  Annual mammograms are recommended for all women over the age of 40.    A reminder letter will be sent to the patient.      Please make sure pt has FU for additional imaging _

## 2024-08-27 ENCOUNTER — OFFICE VISIT (OUTPATIENT)
Dept: NEUROSURGERY | Age: 54
End: 2024-08-27
Payer: COMMERCIAL

## 2024-08-27 VITALS
HEART RATE: 65 BPM | DIASTOLIC BLOOD PRESSURE: 92 MMHG | TEMPERATURE: 98.1 F | WEIGHT: 172.4 LBS | HEIGHT: 66 IN | BODY MASS INDEX: 27.71 KG/M2 | SYSTOLIC BLOOD PRESSURE: 174 MMHG | OXYGEN SATURATION: 99 %

## 2024-08-27 DIAGNOSIS — R26.2 DIFFICULTY IN WALKING: ICD-10-CM

## 2024-08-27 DIAGNOSIS — G95.89 MYELOMALACIA OF CERVICAL CORD (HCC): Primary | ICD-10-CM

## 2024-08-27 DIAGNOSIS — R32 URINARY INCONTINENCE, UNSPECIFIED TYPE: ICD-10-CM

## 2024-08-27 PROCEDURE — 3077F SYST BP >= 140 MM HG: CPT | Performed by: NURSE PRACTITIONER

## 2024-08-27 PROCEDURE — 3080F DIAST BP >= 90 MM HG: CPT | Performed by: NURSE PRACTITIONER

## 2024-08-27 PROCEDURE — 99215 OFFICE O/P EST HI 40 MIN: CPT | Performed by: NURSE PRACTITIONER

## 2024-08-27 NOTE — PROGRESS NOTES
imaging.  Did discuss with Dr. Newton that the patient may benefit from starting some baclofen if this is in fact related to spinal cord spasticity.    Total of 45 minutes was spent in chart review, patient consultation, and documentation.  No diagnosis found.    Notes are transcribed with Stat, a medical voice recording dictation service, and may contain minor errors.    Suzy Garcia NP  [facility]  [facility]

## 2024-08-28 DIAGNOSIS — Z98.890 HISTORY OF CERVICAL DISCECTOMY: Primary | ICD-10-CM

## 2024-08-28 DIAGNOSIS — Z98.1 S/P CERVICAL SPINAL FUSION: ICD-10-CM

## 2024-08-28 DIAGNOSIS — R32 URINARY INCONTINENCE, UNSPECIFIED TYPE: ICD-10-CM

## 2024-08-28 DIAGNOSIS — R26.2 DIFFICULTY IN WALKING: ICD-10-CM

## 2024-09-05 ENCOUNTER — HOSPITAL ENCOUNTER (OUTPATIENT)
Dept: MAMMOGRAPHY | Age: 54
Discharge: HOME | End: 2024-09-08
Attending: OBSTETRICS & GYNECOLOGY
Payer: COMMERCIAL

## 2024-09-05 ENCOUNTER — APPOINTMENT (OUTPATIENT)
Dept: MRI IMAGING | Age: 54
End: 2024-09-05
Payer: COMMERCIAL

## 2024-09-05 DIAGNOSIS — R92.8 ABNORMAL SCREENING MAMMOGRAM: ICD-10-CM

## 2024-09-05 PROCEDURE — G0279 TOMOSYNTHESIS, MAMMO: HCPCS

## 2024-09-06 ENCOUNTER — HOSPITAL ENCOUNTER (OUTPATIENT)
Dept: MRI IMAGING | Age: 54
Discharge: HOME | End: 2024-09-09
Payer: COMMERCIAL

## 2024-09-06 DIAGNOSIS — R32 URINARY INCONTINENCE, UNSPECIFIED TYPE: ICD-10-CM

## 2024-09-06 DIAGNOSIS — Z98.1 S/P CERVICAL SPINAL FUSION: ICD-10-CM

## 2024-09-06 DIAGNOSIS — R26.2 DIFFICULTY IN WALKING: ICD-10-CM

## 2024-09-06 DIAGNOSIS — Z98.890 HISTORY OF CERVICAL DISCECTOMY: ICD-10-CM

## 2024-09-06 PROCEDURE — 70553 MRI BRAIN STEM W/O & W/DYE: CPT

## 2024-09-06 PROCEDURE — 2580000003 HC RX 258: Performed by: FAMILY MEDICINE

## 2024-09-06 PROCEDURE — 72157 MRI CHEST SPINE W/O & W/DYE: CPT

## 2024-09-06 PROCEDURE — 6360000004 HC RX CONTRAST MEDICATION: Performed by: FAMILY MEDICINE

## 2024-09-06 PROCEDURE — A9579 GAD-BASE MR CONTRAST NOS,1ML: HCPCS | Performed by: FAMILY MEDICINE

## 2024-09-06 RX ORDER — SODIUM CHLORIDE 0.9 % (FLUSH) 0.9 %
10 SYRINGE (ML) INJECTION AS NEEDED
Status: DISCONTINUED | OUTPATIENT
Start: 2024-09-06 | End: 2024-09-10 | Stop reason: HOSPADM

## 2024-09-06 RX ADMIN — SODIUM CHLORIDE, PRESERVATIVE FREE 10 ML: 5 INJECTION INTRAVENOUS at 21:04

## 2024-09-06 RX ADMIN — GADOTERIDOL 16 ML: 279.3 INJECTION, SOLUTION INTRAVENOUS at 21:04

## 2024-09-10 ENCOUNTER — OFFICE VISIT (OUTPATIENT)
Dept: NEUROSURGERY | Age: 54
End: 2024-09-10
Payer: COMMERCIAL

## 2024-09-10 VITALS
WEIGHT: 174 LBS | OXYGEN SATURATION: 98 % | TEMPERATURE: 97.5 F | SYSTOLIC BLOOD PRESSURE: 162 MMHG | DIASTOLIC BLOOD PRESSURE: 74 MMHG | HEART RATE: 74 BPM | BODY MASS INDEX: 27.97 KG/M2 | HEIGHT: 66 IN

## 2024-09-10 DIAGNOSIS — G95.89 MYELOMALACIA OF CERVICAL CORD (HCC): ICD-10-CM

## 2024-09-10 DIAGNOSIS — R32 URINARY INCONTINENCE, UNSPECIFIED TYPE: ICD-10-CM

## 2024-09-10 DIAGNOSIS — Z98.1 S/P CERVICAL SPINAL FUSION: Primary | ICD-10-CM

## 2024-09-10 PROCEDURE — 3077F SYST BP >= 140 MM HG: CPT | Performed by: NURSE PRACTITIONER

## 2024-09-10 PROCEDURE — 99214 OFFICE O/P EST MOD 30 MIN: CPT | Performed by: NURSE PRACTITIONER

## 2024-09-10 PROCEDURE — 3078F DIAST BP <80 MM HG: CPT | Performed by: NURSE PRACTITIONER

## 2024-10-11 DIAGNOSIS — M54.2 CERVICAL SPINE PAIN: Primary | ICD-10-CM

## 2024-10-14 ENCOUNTER — OFFICE VISIT (OUTPATIENT)
Dept: NEUROSURGERY | Age: 54
End: 2024-10-14

## 2024-10-14 ENCOUNTER — HOSPITAL ENCOUNTER (OUTPATIENT)
Dept: GENERAL RADIOLOGY | Age: 54
Discharge: HOME | End: 2024-10-17

## 2024-10-14 VITALS
OXYGEN SATURATION: 99 % | SYSTOLIC BLOOD PRESSURE: 176 MMHG | WEIGHT: 174 LBS | TEMPERATURE: 98.1 F | BODY MASS INDEX: 27.97 KG/M2 | DIASTOLIC BLOOD PRESSURE: 89 MMHG | HEART RATE: 70 BPM | HEIGHT: 66 IN

## 2024-10-14 DIAGNOSIS — Z98.890 STATUS POST LAMINECTOMY: ICD-10-CM

## 2024-10-14 DIAGNOSIS — G95.89 MYELOMALACIA OF CERVICAL CORD (HCC): Primary | ICD-10-CM

## 2024-10-14 DIAGNOSIS — Z98.1 S/P CERVICAL SPINAL FUSION: ICD-10-CM

## 2024-10-14 DIAGNOSIS — G95.9 CERVICAL MYELOPATHY (HCC): ICD-10-CM

## 2024-10-14 DIAGNOSIS — M54.2 CERVICAL SPINE PAIN: ICD-10-CM

## 2024-10-14 PROCEDURE — 99213 OFFICE O/P EST LOW 20 MIN: CPT | Performed by: NEUROLOGICAL SURGERY

## 2024-10-14 PROCEDURE — 3077F SYST BP >= 140 MM HG: CPT | Performed by: NEUROLOGICAL SURGERY

## 2024-10-14 PROCEDURE — 3079F DIAST BP 80-89 MM HG: CPT | Performed by: NEUROLOGICAL SURGERY

## 2024-10-14 PROCEDURE — 72040 X-RAY EXAM NECK SPINE 2-3 VW: CPT

## 2024-10-14 NOTE — PROGRESS NOTES
tablet Take 1 tablet by mouth daily 90 tablet 4    estradiol (ESTRACE VAGINAL) 0.1 MG/GM vaginal cream Place pea-sized amount (2g) into vagina daily x 2wks, then taper dose over 2 weeks to maintenance of 2x/wk for life 42.5 g 4    gabapentin (NEURONTIN) 300 MG capsule Take 300mg in AM, then 600mg in afternoon, then 600mg in PM. 360 capsule 2    meclizine (ANTIVERT) 25 MG tablet 1 tablet as needed Oral Three times a day for 30 days      vibegron (GEMTESA) 75 MG TABS tablet Take 1 tablet by mouth daily 90 tablet 3     No current facility-administered medications for this visit.     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)    Endometriosis    Breast cyst    Spasticity    Conus medullaris syndrome (HCC)    Dense breast tissue on mammogram    Cervical myelopathy (HCC)    Hematuria    Weakness of both legs    Numbness in left leg    Bowel dysfunction    Incomplete defecation    Change in bowel habits    Chronic constipation    Urinary incontinence    Vaginal atrophy    Neuropathic pain of both feet    Spastic gait    History of fusion of cervical spine    Myelomalacia (HCC)    S/P cervical spinal fusion    Neuroforaminal stenosis of lumbar spine    Chronic hypertension        Review of Systems      Physical Exam:  BP (!) 176/89   Pulse 70   Temp 98.1 °F (36.7 °C)   Ht 1.676 m (5' 6\")   Wt 78.9 kg (174 lb)   SpO2 99%   BMI 28.08 kg/m²   Pain Score:   4  Head normocephalic and atraumatic  Mood and affect appropriate  General: No acute distress  Skin: warm and dry  Awake, alert, and oriented   Speech Fluent  Eyes open spontaneously   Face symmetric   Patient with strength exam as follows:   Upper Extremities: Right Left      Deltoid  5 5    Biceps  5 5    Triceps  5 5      5 5     Lower Extremities:      Hip

## 2024-12-23 ENCOUNTER — TELEPHONE (OUTPATIENT)
Dept: UROLOGY | Age: 54
End: 2024-12-23

## 2024-12-23 ENCOUNTER — NURSE ONLY (OUTPATIENT)
Dept: UROLOGY | Age: 54
End: 2024-12-23

## 2024-12-23 DIAGNOSIS — N39.0 RECURRENT UTI: ICD-10-CM

## 2024-12-23 DIAGNOSIS — N39.0 RECURRENT UTI: Primary | ICD-10-CM

## 2024-12-23 LAB
BILIRUBIN, URINE, POC: NEGATIVE
BLOOD URINE, POC: NORMAL
GLUCOSE URINE, POC: NEGATIVE MG/DL
KETONES, URINE, POC: NEGATIVE MG/DL
LEUKOCYTE ESTERASE, URINE, POC: NEGATIVE
NITRITE, URINE, POC: NEGATIVE
PH, URINE, POC: 5.5 (ref 4.6–8)
PROTEIN,URINE, POC: NEGATIVE MG/DL
SPECIFIC GRAVITY, URINE, POC: 1.02 (ref 1–1.03)
URINALYSIS CLARITY, POC: NORMAL
URINALYSIS COLOR, POC: NORMAL
UROBILINOGEN, POC: NORMAL MG/DL

## 2024-12-23 PROCEDURE — 81003 URINALYSIS AUTO W/O SCOPE: CPT | Performed by: NURSE PRACTITIONER

## 2024-12-23 RX ORDER — CEPHALEXIN 500 MG/1
500 CAPSULE ORAL 3 TIMES DAILY
Qty: 21 CAPSULE | Refills: 0 | Status: SHIPPED | OUTPATIENT
Start: 2024-12-23

## 2024-12-23 NOTE — PROGRESS NOTES
Results for orders placed or performed in visit on 12/23/24   AMB POC URINALYSIS DIP STICK AUTO W/O MICRO   Result Value Ref Range    Color (UA POC)      Clarity (UA POC)      Glucose, Urine, POC Negative Negative mg/dL    Bilirubin, Urine, POC Negative Negative    KETONES, Urine, POC Negative Negative mg/dL    Specific Gravity, Urine, POC 1.025 1.001 - 1.035    Blood (UA POC) Small     pH, Urine, POC 5.5 4.6 - 8.0    Protein, Urine, POC Negative Negative mg/dL    Urobilinogen, POC 0.2 mg/dL <1.1 mg/dL    Nitrite, Urine, POC Negative Negative    Leukocyte Esterase, Urine, POC Negative Negative     Urine shows blood. Will send ucx. Start keflex. Pt will be called w results.   KIMBERLEE Jacob - CNP

## 2024-12-23 NOTE — TELEPHONE ENCOUNTER
Ldvm/mcm   Urine shows blood. We will send a urine culture to be sure of no infection   Keflex have been sent to your pharmacy. Once results are back Farheen will give you a call with results.

## 2024-12-25 LAB
BACTERIA SPEC CULT: NORMAL
SERVICE CMNT-IMP: NORMAL

## 2025-01-08 ENCOUNTER — OFFICE VISIT (OUTPATIENT)
Dept: UROLOGY | Age: 55
End: 2025-01-08

## 2025-01-08 DIAGNOSIS — N32.81 OAB (OVERACTIVE BLADDER): ICD-10-CM

## 2025-01-08 DIAGNOSIS — N39.0 RECURRENT UTI: Primary | ICD-10-CM

## 2025-01-08 LAB
BILIRUBIN, URINE, POC: NEGATIVE
BLOOD URINE, POC: NORMAL
GLUCOSE URINE, POC: NEGATIVE MG/DL
KETONES, URINE, POC: NEGATIVE MG/DL
LEUKOCYTE ESTERASE, URINE, POC: NEGATIVE
NITRITE, URINE, POC: NEGATIVE
PH, URINE, POC: 6 (ref 4.6–8)
PROTEIN,URINE, POC: NEGATIVE MG/DL
SPECIFIC GRAVITY, URINE, POC: 1.02 (ref 1–1.03)
URINALYSIS CLARITY, POC: NORMAL
URINALYSIS COLOR, POC: NORMAL
UROBILINOGEN, POC: NORMAL MG/DL

## 2025-01-08 PROCEDURE — 81003 URINALYSIS AUTO W/O SCOPE: CPT | Performed by: NURSE PRACTITIONER

## 2025-01-08 PROCEDURE — 99213 OFFICE O/P EST LOW 20 MIN: CPT | Performed by: NURSE PRACTITIONER

## 2025-01-08 RX ORDER — VIBEGRON 75 MG/1
75 TABLET, FILM COATED ORAL DAILY
Qty: 90 TABLET | Refills: 3 | Status: SHIPPED | OUTPATIENT
Start: 2025-01-08

## 2025-01-08 ASSESSMENT — ENCOUNTER SYMPTOMS
BACK PAIN: 0
NAUSEA: 0

## 2025-01-08 NOTE — PROGRESS NOTES
[facility]  [address] [place] [id number]  [phone]          Tiffanie Phillips  : 1970    Chief Complaint   Patient presents with    Follow-up          HPI     Tiffanie Phillips is a 54 y.o. female initially referred for ongoing bladder control issues 22. She had anterior cervic discectomy and fusion for severe cord compression and myelopathy early this year. She initially did well. She then started to have worsening muscle weakness especially in the lower legs. Has had cervical, thoracic, and lumbar MRI. Working w PT to help. Has recently seen neurology for nerve conduction testing. The hope is her weakness will improve with time. No add surgical intervention needed.     Since the weakness, she has had bowel and bladder issues. Seen by GI. Had normal colonoscopy. She reports with her bladder, she is having urinary frequency as much as multiple times per hour. Sign UUI. Occ enuresis. No prior h/o bladder troubles.      . On BCP. Followed w Dr Reyna. H/O fibroids. Has regular normal pelvic exams and PAPS.      Stopped detrol. Started on myrbetriq 25 mg PO daily and sent to PFT. Due to lack of improvement, we increased to Myrbetriq 50. This initially helped but then started to have more UUI. Now on Gemtesa 75 mg PO daily. This has helped a lot.      Had recurrence of UTI in . Now OFF Macrobid suppression since 3/5/24. Has PRN Macrobid.     Has seen Dr Morse. Started on vaginal estrogen cream. She is no longer on this.      Following w GI for constipation.             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

## 2025-03-11 RX ORDER — METHYLPREDNISOLONE 4 MG/1
TABLET ORAL
Qty: 1 KIT | Refills: 0 | Status: SHIPPED | OUTPATIENT
Start: 2025-03-11

## 2025-03-11 RX ORDER — CYCLOBENZAPRINE HCL 10 MG
10 TABLET ORAL 3 TIMES DAILY PRN
Qty: 30 TABLET | Refills: 0 | Status: SHIPPED | OUTPATIENT
Start: 2025-03-11 | End: 2025-03-21

## 2025-03-11 NOTE — PROGRESS NOTES
Prescription for Medrol and Flexeril ordered and signed. Sent electronically to the pharmacy.     Kervin Mueller MD, FAANS, FCNS   Neurosurgeon  [facility]  [facility]   3/11/2025 at 1:25 PM

## 2025-04-14 ENCOUNTER — OFFICE VISIT (OUTPATIENT)
Dept: NEUROSURGERY | Age: 55
End: 2025-04-14

## 2025-04-14 ENCOUNTER — HOSPITAL ENCOUNTER (OUTPATIENT)
Dept: GENERAL RADIOLOGY | Age: 55
Discharge: HOME | End: 2025-04-16

## 2025-04-14 VITALS
SYSTOLIC BLOOD PRESSURE: 160 MMHG | WEIGHT: 174 LBS | TEMPERATURE: 97.9 F | OXYGEN SATURATION: 100 % | BODY MASS INDEX: 27.97 KG/M2 | DIASTOLIC BLOOD PRESSURE: 74 MMHG | HEART RATE: 76 BPM | HEIGHT: 66 IN

## 2025-04-14 DIAGNOSIS — Z98.1 S/P CERVICAL SPINAL FUSION: ICD-10-CM

## 2025-04-14 DIAGNOSIS — G95.89 MYELOMALACIA OF CERVICAL CORD (HCC): Primary | ICD-10-CM

## 2025-04-14 DIAGNOSIS — G95.9 CERVICAL MYELOPATHY (HCC): ICD-10-CM

## 2025-04-14 DIAGNOSIS — Z98.890 STATUS POST LAMINECTOMY: ICD-10-CM

## 2025-04-14 PROCEDURE — 99214 OFFICE O/P EST MOD 30 MIN: CPT | Performed by: NEUROLOGICAL SURGERY

## 2025-04-14 PROCEDURE — 3078F DIAST BP <80 MM HG: CPT | Performed by: NEUROLOGICAL SURGERY

## 2025-04-14 PROCEDURE — 3077F SYST BP >= 140 MM HG: CPT | Performed by: NEUROLOGICAL SURGERY

## 2025-04-14 PROCEDURE — 72040 X-RAY EXAM NECK SPINE 2-3 VW: CPT

## 2025-04-14 RX ORDER — METHYLPREDNISOLONE 4 MG/1
TABLET ORAL
Qty: 1 KIT | Refills: 0 | Status: SHIPPED | OUTPATIENT
Start: 2025-04-14

## 2025-04-14 NOTE — PROGRESS NOTES
[facility] NOTE:   History of Present Illness:    CC: Postoperative follow-up    Tiffanie Phillips is a 55 y.o. female who presents today for follow-up evaluation.  The patient has a history of undergoing a C4-C7 posterior cervical laminectomy with instrumented fixation and lateral mass arthrodesis for treatment of cervical myelopathy cervical spinal stenosis with myelomalacia performed on 4/16/2024.  She also has a history of undergoing a C5-C6 anterior cervical discectomy and fusion that was performed by Dr. Jalen Boone in 2022.  The patient has x-ray AP and lateral imaging of the cervical spine performed on 4/14/2025 that demonstrates evidence of a C4-C7 posterior cervical instrumented fixation construct with evidence of a prior C5-C6 anterior cervical discectomy and fusion construct this is stable without significant interval change in comparison to prior x-ray imaging performed on 10/14/2024.  This is consistent with a stable fusion construct.    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) 8/16/2021    Chronic hypertension     Conus medullaris syndrome (HCC) 07/13/2021    Depression 2022    Endometriosis determined by laparoscopy     Fibroids     Fibromyalgia     High cholesterol     Hypertension     Overactive bladder 2021    Pancreatitis     x5- acute attacks; no problems in past 2 years    Thoracic myelopathy 7/13/2021    Vitamin B 12 deficiency     B12 injections     Past Surgical History:   Procedure Laterality Date    ANTERIOR CERVICAL DISCECTOMY W/ FUSION  2021    C5-C6    CERVICAL LAMINECTOMY N/A 4/16/2024    C4-C7 posterior cervical laminectomy with instrumented fixation and lateral mass arthrodesis. performed by Kervin Mueller MD at [facility] MAIN OR    COLONOSCOPY

## 2025-05-09 DIAGNOSIS — G95.89 MYELOMALACIA OF CERVICAL CORD (HCC): Primary | ICD-10-CM

## 2025-05-09 DIAGNOSIS — Z98.1 S/P CERVICAL SPINAL FUSION: ICD-10-CM

## 2025-05-09 DIAGNOSIS — G95.9 CERVICAL MYELOPATHY (HCC): ICD-10-CM

## 2025-05-09 DIAGNOSIS — Z98.890 STATUS POST LAMINECTOMY: ICD-10-CM

## 2025-07-14 ENCOUNTER — OFFICE VISIT (OUTPATIENT)
Dept: NEUROSURGERY | Age: 55
End: 2025-07-14

## 2025-07-14 ENCOUNTER — HOSPITAL ENCOUNTER (OUTPATIENT)
Dept: GENERAL RADIOLOGY | Age: 55
Discharge: HOME | End: 2025-07-16

## 2025-07-14 VITALS
WEIGHT: 174 LBS | SYSTOLIC BLOOD PRESSURE: 146 MMHG | DIASTOLIC BLOOD PRESSURE: 68 MMHG | OXYGEN SATURATION: 98 % | HEART RATE: 75 BPM | TEMPERATURE: 98.2 F | BODY MASS INDEX: 27.97 KG/M2 | HEIGHT: 66 IN

## 2025-07-14 DIAGNOSIS — Z98.1 S/P CERVICAL SPINAL FUSION: Primary | ICD-10-CM

## 2025-07-14 DIAGNOSIS — G95.89 MYELOMALACIA OF CERVICAL CORD (HCC): ICD-10-CM

## 2025-07-14 DIAGNOSIS — Z98.890 STATUS POST LAMINECTOMY: ICD-10-CM

## 2025-07-14 DIAGNOSIS — G95.9 CERVICAL MYELOPATHY (HCC): ICD-10-CM

## 2025-07-14 DIAGNOSIS — Z98.1 S/P CERVICAL SPINAL FUSION: ICD-10-CM

## 2025-07-14 PROCEDURE — 72040 X-RAY EXAM NECK SPINE 2-3 VW: CPT

## 2025-07-14 PROCEDURE — 99213 OFFICE O/P EST LOW 20 MIN: CPT | Performed by: NEUROLOGICAL SURGERY

## 2025-07-14 PROCEDURE — 3077F SYST BP >= 140 MM HG: CPT | Performed by: NEUROLOGICAL SURGERY

## 2025-07-14 PROCEDURE — 3078F DIAST BP <80 MM HG: CPT | Performed by: NEUROLOGICAL SURGERY

## 2025-07-14 NOTE — PROGRESS NOTES
[facility] NOTE:   History of Present Illness:    CC: Follow-up evaluation    Tiffanie Phillips is a 55 y.o. female who presents today for follow-up evaluation.  The patient has a history of undergoing a C4-C7 posterior cervical laminectomy with instrumented fixation and lateral mass arthrodesis for treatment of cervical myelopathy cervical spinal stenosis with myelomalacia performed on 4/16/2024. She also has a history of undergoing a C5-C6 anterior cervical discectomy and fusion that was performed by Dr. Jalen Boone in 2022. The patient returns with x-ray AP and lateral imaging of the cervical spine that demonstrates a prior anterior C5-C6 discectomy and fusion as well as a C4-C7 posterior cervical laminectomy with instrumented fixation and fusion construct.  There is no significant neural change and can parison to prior x-ray imaging is consistent with a stable fusion construct.  She has done well in the postoperative setting she now rates her pain as a 2 out of 10 on a visual analog pain scale.  I prescribed Medrol Dosepak in April and that has completely resolved the pain she was experiencing at that time.  She is pleased with her progress.    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

## 2025-08-02 SDOH — ECONOMIC STABILITY: FOOD INSECURITY: WITHIN THE PAST 12 MONTHS, YOU WORRIED THAT YOUR FOOD WOULD RUN OUT BEFORE YOU GOT MONEY TO BUY MORE.: NEVER TRUE

## 2025-08-02 SDOH — ECONOMIC STABILITY: INCOME INSECURITY: IN THE LAST 12 MONTHS, WAS THERE A TIME WHEN YOU WERE NOT ABLE TO PAY THE MORTGAGE OR RENT ON TIME?: NO

## 2025-08-02 SDOH — ECONOMIC STABILITY: FOOD INSECURITY: WITHIN THE PAST 12 MONTHS, THE FOOD YOU BOUGHT JUST DIDN'T LAST AND YOU DIDN'T HAVE MONEY TO GET MORE.: NEVER TRUE

## 2025-08-02 SDOH — ECONOMIC STABILITY: TRANSPORTATION INSECURITY
IN THE PAST 12 MONTHS, HAS THE LACK OF TRANSPORTATION KEPT YOU FROM MEDICAL APPOINTMENTS OR FROM GETTING MEDICATIONS?: NO

## 2025-08-05 ENCOUNTER — OFFICE VISIT (OUTPATIENT)
Dept: OBGYN CLINIC | Age: 55
End: 2025-08-05

## 2025-08-05 VITALS
HEIGHT: 66 IN | SYSTOLIC BLOOD PRESSURE: 140 MMHG | DIASTOLIC BLOOD PRESSURE: 78 MMHG | WEIGHT: 173 LBS | BODY MASS INDEX: 27.8 KG/M2

## 2025-08-05 DIAGNOSIS — Z12.31 ENCOUNTER FOR SCREENING MAMMOGRAM FOR MALIGNANT NEOPLASM OF BREAST: ICD-10-CM

## 2025-08-05 DIAGNOSIS — Z01.419 WELL WOMAN EXAM WITH ROUTINE GYNECOLOGICAL EXAM: Primary | ICD-10-CM

## 2025-08-05 PROCEDURE — 3077F SYST BP >= 140 MM HG: CPT | Performed by: OBSTETRICS & GYNECOLOGY

## 2025-08-05 PROCEDURE — 99396 PREV VISIT EST AGE 40-64: CPT | Performed by: OBSTETRICS & GYNECOLOGY

## 2025-08-05 PROCEDURE — 3078F DIAST BP <80 MM HG: CPT | Performed by: OBSTETRICS & GYNECOLOGY

## 2025-08-05 RX ORDER — ESTRADIOL 0.1 MG/G
CREAM VAGINAL
Qty: 42.5 G | Refills: 4 | Status: SHIPPED | OUTPATIENT
Start: 2025-08-05

## 2025-08-05 RX ORDER — NORETHINDRONE 5 MG/1
5 TABLET ORAL DAILY
Qty: 90 TABLET | Refills: 4 | Status: SHIPPED | OUTPATIENT
Start: 2025-08-05

## 2025-08-05 ASSESSMENT — PATIENT HEALTH QUESTIONNAIRE - PHQ9
1. LITTLE INTEREST OR PLEASURE IN DOING THINGS: MORE THAN HALF THE DAYS
SUM OF ALL RESPONSES TO PHQ QUESTIONS 1-9: 3
2. FEELING DOWN, DEPRESSED OR HOPELESS: SEVERAL DAYS
SUM OF ALL RESPONSES TO PHQ QUESTIONS 1-9: 3

## 2025-08-29 ENCOUNTER — HOSPITAL ENCOUNTER (OUTPATIENT)
Dept: MAMMOGRAPHY | Age: 55
Discharge: HOME | End: 2025-09-01

## 2025-08-29 VITALS — HEIGHT: 66 IN | WEIGHT: 173 LBS | BODY MASS INDEX: 27.8 KG/M2

## 2025-08-29 DIAGNOSIS — Z12.31 ENCOUNTER FOR SCREENING MAMMOGRAM FOR MALIGNANT NEOPLASM OF BREAST: ICD-10-CM

## 2025-08-29 PROCEDURE — 77063 BREAST TOMOSYNTHESIS BI: CPT

## (undated) DEVICE — DERMABOND SKIN ADH 0.7ML -- DERMABOND ADVANCED 12/BX

## (undated) DEVICE — BLADE ES ELASTOMERIC COAT INSUL DURABLE BEND UPTO 90DEG

## (undated) DEVICE — BONE WAX WHITE: Brand: BONE WAX WHITE

## (undated) DEVICE — NEEDLE SYR 18GA L1.5IN RED PLAS HUB S STL BLNT FILL W/O

## (undated) DEVICE — SYRINGE MED 30ML STD CLR PLAS LUERLOCK TIP N CTRL DISP

## (undated) DEVICE — Z DISCONTINUED USE 2220281 SUTURE VICRYL SZ 2-0 L18IN ABSRB UD L26MM CP-2 1/2 CIR REV J762D

## (undated) DEVICE — FORCEPS BPLR L210MM TIP L1.5 WRK L105MM STR BAYNT INSUL DISP

## (undated) DEVICE — GAUZE,SPONGE,4"X4",12PLY,WOVEN,NS,LF: Brand: MEDLINE

## (undated) DEVICE — SNARE POLYP SM W13MMXL240CM SHTH DIA2.4MM OVL FLX DISP

## (undated) DEVICE — AIRLIFE™ OXYGEN TUBING 7 FEET (2.1 M) CRUSH RESISTANT OXYGEN TUBING, VINYL TIPPED: Brand: AIRLIFE™

## (undated) DEVICE — ACDF DR VANPELT & LUCAS: Brand: MEDLINE INDUSTRIES, INC.

## (undated) DEVICE — 4.0MM PRECISION ROUND

## (undated) DEVICE — LUBE JELLY FOIL PACK 1.4 OZ: Brand: MEDLINE INDUSTRIES, INC.

## (undated) DEVICE — DRAPE TWL SURG 16X26IN BLU ORB04] ALLCARE INC]

## (undated) DEVICE — SPINE NEURO: Brand: MEDLINE INDUSTRIES, INC.

## (undated) DEVICE — ADHESIVE SKIN CLOSURE WND 8.661X1.5 IN 22 CM LIQUIBAND SECUR

## (undated) DEVICE — SPONGE,NEURO,0.5"X0.5",XR,STRL,10/PK: Brand: MEDLINE

## (undated) DEVICE — BIPOLAR SEALER 23-112-1 AQM 6.0: Brand: AQUAMANTYS ®

## (undated) DEVICE — KIT EVAC 0.13IN RECT TB DIA10FR 400CC PVC 3 SPR Y CONN DRN

## (undated) DEVICE — GLOVE ORANGE PI 7   MSG9070

## (undated) DEVICE — PREP SKN CHLRAPRP APL 26ML STR --

## (undated) DEVICE — HEX-LOCKING BLADE ELECTRODE: Brand: EDGE

## (undated) DEVICE — CARBIDE MATCH HEAD

## (undated) DEVICE — CARDINAL HEALTH FLEXIBLE LIGHT HANDLE COVER: Brand: CARDINAL HEALTH

## (undated) DEVICE — SHEET,DRAPE,53X77,STERILE: Brand: MEDLINE

## (undated) DEVICE — ABSORBENT, WATERPROOF, BACTERIA PROOF FILM DRESSING: Brand: OPSITE POST OP 20X10CM CTN 20

## (undated) DEVICE — MARKER SURG SKIN UTIL BLK REG TIP NONSMEARING W/ 6IN RUL

## (undated) DEVICE — C-ARM: Brand: UNBRANDED

## (undated) DEVICE — CANNULA NSL ORAL AD FOR CAPNOFLEX CO2 O2 AIRLFE

## (undated) DEVICE — GARMENT,MEDLINE,DVT,INT,CALF,MED, GEN2: Brand: MEDLINE

## (undated) DEVICE — TUBING, SUCTION, 1/4" X 10', STRAIGHT: Brand: MEDLINE

## (undated) DEVICE — DISPOSABLE STANDARD BIPOLAR FORCEPS, NON-STICK,: Brand: SPETZLER-MALIS

## (undated) DEVICE — INTENDED FOR TISSUE SEPARATION, AND OTHER PROCEDURES THAT REQUIRE A SHARP SURGICAL BLADE TO PUNCTURE OR CUT.: Brand: BARD-PARKER SAFETY BLADES SIZE 10, STERILE

## (undated) DEVICE — Z DISCONTINUED USE 2665275 SUTURE VICRYL VIO BR 0 18IN C/R M04 J701D

## (undated) DEVICE — CONNECTOR TBNG OD5-7MM O2 END DISP

## (undated) DEVICE — 3M™ IOBAN™ 2 ANTIMICROBIAL INCISE DRAPE 6650EZ: Brand: IOBAN™ 2

## (undated) DEVICE — BIT DRL 3.5 MM TAPR DISP

## (undated) DEVICE — SPONGE: SPECIALTY PEANUT XR 100/CS: Brand: MEDICAL ACTION INDUSTRIES

## (undated) DEVICE — SOLUTION IRRIG 1000ML 0.9% SOD CHL USP POUR PLAS BTL

## (undated) DEVICE — SYRINGE, LUER SLIP, STERILE, 60ML: Brand: MEDLINE

## (undated) DEVICE — KENDALL RADIOLUCENT FOAM MONITORING ELECTRODE RECTANGULAR SHAPE: Brand: KENDALL

## (undated) DEVICE — DRAPE SHT 3 QTR PROXIMA 53X77 --

## (undated) DEVICE — DRAPE MICSCP W51XL150IN FOR LEICA M680 WILD OHS

## (undated) DEVICE — SYRINGE MED 3ML CLR PLAS STD N CTRL LUERLOCK TIP DISP

## (undated) DEVICE — NEEDLE SPNL 22GA TRNSLUC YEL WIND HUB ANES FIT STYL DISP

## (undated) DEVICE — SPONGE,NEURO,0.5"X3",XR,STRL,LF,10/PK: Brand: MEDLINE

## (undated) DEVICE — SYRINGE MED 10ML LUERLOCK TIP W/O SFTY DISP

## (undated) DEVICE — DRAPE IRRIG FLD WRM W44XL44IN W/ AORN STD PRTBL INTRATEMP

## (undated) DEVICE — REM POLYHESIVE ADULT PATIENT RETURN ELECTRODE: Brand: VALLEYLAB

## (undated) DEVICE — CORD,CAUTERY,BIPOLAR,STERILE: Brand: MEDLINE

## (undated) DEVICE — BIT DRLL SPNL 2.3MM

## (undated) DEVICE — ABSORBENT, WATERPROOF, BACTERIA PROOF FILM DRESSING: Brand: OPSITE POST OP 9.5X8.5CM CTN 20

## (undated) DEVICE — AGENT HEMOSTATIC SURGIFLOW MATRIX KIT W/THROMBIN

## (undated) DEVICE — CONTAINER,SPECIMEN,O.R.STRL,4.5OZ: Brand: MEDLINE

## (undated) DEVICE — ELECTRODE PT RET AD L9FT HI MOIST COND ADH HYDRGEL CORDED

## (undated) DEVICE — APPLICATOR MEDICATED 26 CC SOLUTION HI LT ORNG CHLORAPREP

## (undated) DEVICE — BAND RUB 1/8X2.5IN STRL --

## (undated) DEVICE — UNIVERSAL DRAPES: Brand: MEDLINE INDUSTRIES, INC.

## (undated) DEVICE — AGENT HEMSTAT 8ML FLX TIP MTRX + DISP SURGIFLO

## (undated) DEVICE — SPONGE LAPAROTOMY W18XL18IN WHITE STRUNG RADIOPAQUE STERILE

## (undated) DEVICE — INTENDED FOR TISSUE SEPARATION, AND OTHER PROCEDURES THAT REQUIRE A SHARP SURGICAL BLADE TO PUNCTURE OR CUT.: Brand: BARD-PARKER ® STAINLESS STEEL BLADES

## (undated) DEVICE — YANKAUER,BULB TIP,W/O VENT,RIGID,STERILE: Brand: MEDLINE

## (undated) DEVICE — 1LYRTR 16FR10ML 100%SILI SNAP: Brand: MEDLINE INDUSTRIES, INC.

## (undated) DEVICE — SPONGE,NEURO,0.5"X1",XR,STRL,LF,10/PK: Brand: MEDLINE

## (undated) DEVICE — CLOTH PRE OP W9XL10.5IN 2% CHG FRAGRANCE RNS FREE READYPREP

## (undated) DEVICE — SUTURE VICRYL SZ 3-0 L18IN ABSRB UD L26MM SH 1/2 CIR J864D

## (undated) DEVICE — DRILL BIT 14MM

## (undated) DEVICE — GLOVE SURG SZ 75 L12IN FNGR THK79MIL GRN LTX FREE

## (undated) DEVICE — WAX SURG 2.5GM HEMSTAT BNE BEESWAX PARAFFIN ISO PALMITATE

## (undated) DEVICE — GOWN,REINFORCED,POLY,AURORA,XXLARGE,STR: Brand: MEDLINE

## (undated) DEVICE — INTENDED FOR TISSUE SEPARATION, AND OTHER PROCEDURES THAT REQUIRE A SHARP SURGICAL BLADE TO PUNCTURE OR CUT.: Brand: BARD-PARKER SAFETY BLADES SIZE 15, STERILE

## (undated) DEVICE — SINGLE PORT MANIFOLD: Brand: NEPTUNE 2

## (undated) DEVICE — SUTURE VCRL + 3-0 L27IN ABSRB UD PS-2 L19MM 3/8 CIR PRIM VCP427H

## (undated) DEVICE — FINE BLADE: Brand: MILL PLUS

## (undated) DEVICE — MAYFIELD® DISPOSABLE ADULT SKULL PIN (PLASTIC BASE): Brand: MAYFIELD®

## (undated) DEVICE — SOLUTION IRRIG 1000ML 09% SOD CHL USP PIC PLAS CONTAINER

## (undated) DEVICE — CONTAINER FORMALIN PREFILLED 10% NBF 60ML

## (undated) DEVICE — 3M™ STERI-DRAPE™ INSTRUMENT POUCH 1018: Brand: STERI-DRAPE™

## (undated) DEVICE — SURGIFOAM SPNG SZ 100

## (undated) DEVICE — CASPAR DISTRACTION PIN 14MM: Brand: AESCULAP